# Patient Record
Sex: FEMALE | Race: WHITE | NOT HISPANIC OR LATINO | Employment: UNEMPLOYED | URBAN - METROPOLITAN AREA
[De-identification: names, ages, dates, MRNs, and addresses within clinical notes are randomized per-mention and may not be internally consistent; named-entity substitution may affect disease eponyms.]

---
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
## 2023-01-18 ENCOUNTER — OFFICE VISIT (OUTPATIENT)
Dept: PEDIATRICS | Facility: CLINIC | Age: 18
End: 2023-01-18
Payer: COMMERCIAL

## 2023-01-18 VITALS
DIASTOLIC BLOOD PRESSURE: 71 MMHG | OXYGEN SATURATION: 97 % | BODY MASS INDEX: 26.94 KG/M2 | TEMPERATURE: 97.8 F | WEIGHT: 157.8 LBS | HEIGHT: 64 IN | HEART RATE: 87 BPM | SYSTOLIC BLOOD PRESSURE: 117 MMHG

## 2023-01-18 DIAGNOSIS — Z86.19 HISTORY OF CHLAMYDIA: Primary | ICD-10-CM

## 2023-01-18 PROCEDURE — 99213 OFFICE O/P EST LOW 20 MIN: CPT | Performed by: PEDIATRICS

## 2023-01-18 PROCEDURE — 87491 CHLMYD TRACH DNA AMP PROBE: CPT | Performed by: PEDIATRICS

## 2023-01-18 NOTE — PROGRESS NOTES
"  Assessment & Plan     History of chlamydia  - Chlamydia trachomatis PCR  If tests positive, would like Doxycycline again sent to the [facility] in [place].             BMI:   Estimated body mass index is 27.09 kg/m  as calculated from the following:    Height as of this encounter: 5' 4\" (1.626 m).    Weight as of this encounter: 157 lb 12.8 oz (71.6 kg).       Return in about 2 weeks (around 2/1/2023) for nexplanon removal and replacement.    Alem Nagel MD  [facility]      Víctor Yusuf is a 17 year old, presenting for the following health issues:  STD      STD    History of Present Illness       Reason for visit:  Std check up    She eats 2-3 servings of fruits and vegetables daily.She consumes 1 sweetened beverage(s) daily.She exercises with enough effort to increase her heart rate 9 or less minutes per day.  She exercises with enough effort to increase her heart rate 3 or less days per week.   She is taking medications regularly.     Madelin tested postive for chlamydia 1 month ago.  She was treated with Doxycyclene and comepleted 14 day course.  Her sexual partner was perhaps also treated, patient informed him and has not been sexually active with him since.    She had negative testing at that time for gonorrhea, syphilis, and HIV.  She has never had any symptoms, whether she was testing positive or negative for chlamydia.    Madelin has a nexplanon in place that was placed at the end of January 2020.  She would like to have it removed and a new one placed.  She has questions about the procedure and how to schedule it, as well as when she would need to use back up contraception.  She is not sexually active recently.          Review of Systems   Constitutional, HEENT, cardiovascular, pulmonary, gi and gu systems are negative, except as otherwise noted.      Objective    /71   Pulse 87   Temp 97.8  F (36.6  C) (Tympanic)   Ht 5' 4\" (1.626 m)   Wt 157 lb 12.8 oz (71.6 " kg)   SpO2 97%   BMI 27.09 kg/m    Body mass index is 27.09 kg/m .  Physical Exam   GENERAL: Healthy, alert and no distress  EYES: Eyes grossly normal to inspection.  No discharge or erythema, or obvious scleral/conjunctival abnormalities.  RESP: No audible wheeze, cough, or visible cyanosis.  No visible retractions or increased work of breathing.    SKIN: Visible skin clear. No significant rash, abnormal pigmentation or lesions.  NEURO: Cranial nerves grossly intact.  Mentation and speech appropriate for age.  PSYCH: Mentation appears normal, affect normal/bright, judgement and insight intact, normal speech and appearance well-groomed.      Chlamydia urine PCR pending.

## 2023-01-19 LAB — C TRACH DNA SPEC QL NAA+PROBE: NEGATIVE

## 2023-01-19 NOTE — RESULT ENCOUNTER NOTE
Chlamydia testing is negative today. Usually recommend recheck every 6 months. Please make sure your partner(s) are also being treated. Recommend use condoms EVERY TIME!    Latricia Pereira MD on 1/19/2023 at 11:38 AM

## 2023-01-30 ENCOUNTER — OFFICE VISIT (OUTPATIENT)
Dept: PEDIATRICS | Facility: CLINIC | Age: 18
End: 2023-01-30
Payer: COMMERCIAL

## 2023-01-30 VITALS
HEART RATE: 105 BPM | BODY MASS INDEX: 28.01 KG/M2 | OXYGEN SATURATION: 99 % | WEIGHT: 163.2 LBS | TEMPERATURE: 98.9 F | DIASTOLIC BLOOD PRESSURE: 70 MMHG | SYSTOLIC BLOOD PRESSURE: 115 MMHG

## 2023-01-30 DIAGNOSIS — Z30.46 ENCOUNTER FOR REMOVAL AND REINSERTION OF NEXPLANON: Primary | ICD-10-CM

## 2023-01-30 RX ORDER — ETONOGESTREL 68 MG/1
1 IMPLANT SUBCUTANEOUS ONCE
Qty: 1 EACH | Refills: 0
Start: 2023-01-30 | End: 2023-01-30

## 2023-01-30 NOTE — PROGRESS NOTES
Subjective   Madelin is a 17 year old, presenting for the following health issues:  Contraception      INDICATIONS:                                                      Patient presents for removal and replacement of Nexplanon for contraception.  She has had been counseled on side effects, risks, benefits and alternatives.  Patient desires to proceed.    Is a pregnancy test required: No.  Was a consent obtained?  Yes    Verification of Procedure:  Just before the procedure begans through verbal and active participation of team members, I verified:     Initials   Patient Name Madelin Spain    Patient  2005    Procedure to be performed Nexplanon removal and new nexplament placement     Consent:  Risks, benefits of treatment, and alternative options for contraception were discussed.  Patient's questions were elicited and answered.  Written consent was obtained and scanned into medical record.     Today's PHQ-2 Score:   PHQ-2 (  [employer]) 2023   Q1: Little interest or pleasure in doing things 0   Q2: Feeling down, depressed or hopeless 0   PHQ-2 Score 0   PHQ-2 Total Score (12-17 Years)- Positive if 3 or more points; Administer PHQ-A if positive -   Q1: Little interest or pleasure in doing things Not at all   Q2: Feeling down, depressed or hopeless Not at all   PHQ-2 Score 0       PROCEDURE:                                                      Patient was resting comfortably on exam table, left arm placed at shoulder level in a 90 degree angle.  Skin was  cleansed with betadine solution.  Nexplanon implant as grasped through the skin and 1 cc of 1% lidocaine with epinephrine is infiltrated under the distal tip of the joshua.  Skin is then cut with #11 scalpel over the distal tip of the joshua, and the joshua is caty from its fibrous sheath.  Joshua is grasped with toothed forceps and removed intact.     Nexplanon device visualized in applicator by patient and provider.  Applicator is placed at skin incision made as  above and advanced with ease in the subdermal space.  Applicator was removed.  Nexplanon was palpated by provider and patient.    Lot number:[id number]   Expiration date: 8/12/24  NDC number: 94100-319-01    Small amount of bleeding noted at insertion site and no bruising noted along track of Nexplanon.  Bandage and pressure dressing applied to insertion site.         POST PROCEDURE:                                                      To be removed/replaced within three years. Written and verbal instructions provided to patient.  She tolerated the procedure well. There were no complications. Patient was discharged in stable condition.    Keep dressing on and dry for 24 hours, then remove wrap.  Replace bandaid daily for 5 days. Keep your user card in a safe place where you'll remember it.  Make note of today's date for removal/replacement of your Nexplanon in 3 years. Call us if there is any redness, tenderness, warmth or drainage from the area of your Nexplanon insertion. Use a backup method of birth control for 7 days.      Alem Nagel MD    Answers for HPI/ROS submitted by the patient on 1/18/2023  What is the reason for your visit today? : Std check up  How many servings of fruits and vegetables do you eat daily?: 2-3  On average, how many sweetened beverages do you drink each day (Examples: soda, juice, sweet tea, etc.  Do NOT count diet or artificially sweetened beverages)?: 1  How many minutes a day do you exercise enough to make your heart beat faster?: 9 or less  How many days a week do you exercise enough to make your heart beat faster?: 3 or less  How many days per week do you miss taking your medication?: 0

## 2023-01-31 PROCEDURE — 58301 REMOVE INTRAUTERINE DEVICE: CPT | Performed by: PEDIATRICS

## 2023-01-31 PROCEDURE — 91312 COVID-19 VACCINE BIVALENT BOOSTER 12+ (PFIZER): CPT | Performed by: PEDIATRICS

## 2023-01-31 PROCEDURE — 0124A COVID-19 VACCINE BIVALENT BOOSTER 12+ (PFIZER): CPT | Performed by: PEDIATRICS

## 2023-01-31 PROCEDURE — 11981 INSERTION DRUG DLVR IMPLANT: CPT | Performed by: PEDIATRICS

## 2023-02-22 ENCOUNTER — TELEPHONE (OUTPATIENT)
Dept: DERMATOLOGY | Facility: CLINIC | Age: 18
End: 2023-02-22
Payer: COMMERCIAL

## 2023-02-22 NOTE — TELEPHONE ENCOUNTER
[facility] Call Center    Phone Message    May a detailed message be left on voicemail: yes     Reason for Call: Other: Patient called to reschedule procedure with Dr. Hinton scheduled for 02/22/2023. Please call back [phone] Thank you     Action Taken: Other: OX DERM    Travel Screening: Not Applicable

## 2023-03-07 ENCOUNTER — OFFICE VISIT (OUTPATIENT)
Dept: PEDIATRICS | Facility: CLINIC | Age: 18
End: 2023-03-07
Payer: COMMERCIAL

## 2023-03-07 VITALS
WEIGHT: 159.1 LBS | SYSTOLIC BLOOD PRESSURE: 92 MMHG | DIASTOLIC BLOOD PRESSURE: 58 MMHG | BODY MASS INDEX: 27.16 KG/M2 | HEART RATE: 84 BPM | TEMPERATURE: 97.5 F | HEIGHT: 64 IN | OXYGEN SATURATION: 99 %

## 2023-03-07 DIAGNOSIS — Z00.129 ENCOUNTER FOR ROUTINE CHILD HEALTH EXAMINATION W/O ABNORMAL FINDINGS: Primary | ICD-10-CM

## 2023-03-07 DIAGNOSIS — K59.00 CONSTIPATION, UNSPECIFIED CONSTIPATION TYPE: ICD-10-CM

## 2023-03-07 LAB — C TRACH DNA SPEC QL NAA+PROBE: NEGATIVE

## 2023-03-07 PROCEDURE — 87491 CHLMYD TRACH DNA AMP PROBE: CPT | Performed by: PEDIATRICS

## 2023-03-07 PROCEDURE — 99213 OFFICE O/P EST LOW 20 MIN: CPT | Mod: 25 | Performed by: PEDIATRICS

## 2023-03-07 PROCEDURE — 92551 PURE TONE HEARING TEST AIR: CPT | Performed by: PEDIATRICS

## 2023-03-07 PROCEDURE — 99173 VISUAL ACUITY SCREEN: CPT | Mod: 59 | Performed by: PEDIATRICS

## 2023-03-07 PROCEDURE — 99395 PREV VISIT EST AGE 18-39: CPT | Performed by: PEDIATRICS

## 2023-03-07 RX ORDER — DOCUSATE SODIUM 100 MG/1
CAPSULE, LIQUID FILLED ORAL
Qty: 100 CAPSULE | Refills: 1 | Status: SHIPPED | OUTPATIENT
Start: 2023-03-07 | End: 2023-04-12

## 2023-03-07 RX ORDER — DOCUSATE SODIUM 100 MG/1
CAPSULE, LIQUID FILLED ORAL
Qty: 100 CAPSULE | Refills: 1 | Status: SHIPPED | OUTPATIENT
Start: 2023-03-07 | End: 2023-03-07

## 2023-03-07 ASSESSMENT — ENCOUNTER SYMPTOMS
MYALGIAS: 0
COUGH: 0
CHILLS: 0
NERVOUS/ANXIOUS: 0
CONSTIPATION: 1
PARESTHESIAS: 0
JOINT SWELLING: 0
NAUSEA: 0
FREQUENCY: 0
DIZZINESS: 0
PALPITATIONS: 0
DIARRHEA: 0
HEADACHES: 0
HEMATURIA: 0
DYSURIA: 0
HEMATOCHEZIA: 0
BREAST MASS: 0
SHORTNESS OF BREATH: 0
FEVER: 0
ARTHRALGIAS: 0
SORE THROAT: 0
EYE PAIN: 0
ABDOMINAL PAIN: 0
WEAKNESS: 0
HEARTBURN: 0

## 2023-03-07 NOTE — PROGRESS NOTES
Preventive Care Visit  [facility]  Mar 7, 2023    Assessment & Plan   17 year old, here for preventive care.    (Z00.129) Encounter for routine child health examination w/o abnormal findings  (primary encounter diagnosis)  Plan: BEHAVIORAL/EMOTIONAL ASSESSMENT (30413),         SCREENING TEST, PURE TONE, AIR ONLY, SCREENING,        VISUAL ACUITY, QUANTITATIVE, BILAT, Chlamydia         trachomatis PCR, Lipid Profile (Chol, Trig,         HDL, LDL calc)            (K59.00) Constipation, unspecified constipation type  Plan: docusate sodium (COLACE) 100 MG capsule,        Continue senna          Patient has been advised of split billing requirements and indicates understanding: Yes  Growth      Height: Normal , Weight: Overweight (BMI 85-94.9%)    Immunizations   Vaccines up to date.MenB Vaccine not indicated.    Anticipatory Guidance    Reviewed age appropriate anticipatory guidance.   SOCIAL/ FAMILY:    Peer pressure    Future plans/ College    Transition to adult care provider  NUTRITION:    Healthy food choices    Weight management  HEALTH / SAFETY:    Adequate sleep/ exercise  SEXUALITY:    Body changes with puberty    Menstruation    Contraception     Safe sex/ STDs    Cleared for sports:  Not addressed    Referrals/Ongoing Specialty Care  None  Verbal Dental Referral: Verbal dental referral was given      Follow Up      Return in 1 year (on 3/7/2024) for Preventive Care visit.    Víctor Yusuf continues to have hard stools, and does not find Miralax effective because she cannot always bring herself to drink the liquid that its in.  She has started taking Senna 25 mg tablet which has made her stools come more often, but they are still firm and somewhat painful      Recent Labs   Lab Test 07/25/22  1126 10/04/21  1213   CHOL 180* 225*   HDL 60 62    150*   TRIG 65 65         Psycho-Social/Depression - PSC-17 required for C&TC through age 18  General  "screening:  No screening tool used  Teen Screen    Teen Screen not completed: patient is an adult    No flowsheet data found.       Objective     Exam  BP 92/58   Pulse 84   Temp 97.5  F (36.4  C) (Tympanic)   Ht 5' 3.75\" (1.619 m)   Wt 159 lb 1.6 oz (72.2 kg)   SpO2 99%   BMI 27.52 kg/m    41 %ile (Z= -0.22) based on CDC (Girls, 2-20 Years) Stature-for-age data based on Stature recorded on 3/7/2023.  87 %ile (Z= 1.12) based on CDC (Girls, 2-20 Years) weight-for-age data using vitals from 3/7/2023.  89 %ile (Z= 1.21) based on CDC (Girls, 2-20 Years) BMI-for-age based on BMI available as of 3/7/2023.  Blood pressure percentiles are not available for patients who are 18 years or older.  Wt Readings from Last 4 Encounters:   03/07/23 159 lb 1.6 oz (72.2 kg) (87 %, Z= 1.12)*   01/30/23 163 lb 3.2 oz (74 kg) (89 %, Z= 1.23)*   01/18/23 157 lb 12.8 oz (71.6 kg) (86 %, Z= 1.09)*   09/09/22 153 lb (69.4 kg) (84 %, Z= 0.98)*     * Growth percentiles are based on [facility] (Girls, 2-20 Years) data.       Vision Screen  Vision Screen Details  Does the patient have corrective lenses (glasses/contacts)?: No  Vision Acuity Screen  Vision Acuity Tool: Lu  RIGHT EYE: 10/10 (20/20)  LEFT EYE: 10/8 (20/16)    Hearing Screen  RIGHT EAR  1000 Hz on Level 40 dB (Conditioning sound): Pass  1000 Hz on Level 20 dB: Pass  2000 Hz on Level 20 dB: Pass  4000 Hz on Level 20 dB: Pass  6000 Hz on Level 20 dB: Pass  8000 Hz on Level 20 dB: Pass  LEFT EAR  8000 Hz on Level 20 dB: Pass  6000 Hz on Level 20 dB: Pass  4000 Hz on Level 20 dB: Pass  2000 Hz on Level 20 dB: Pass  1000 Hz on Level 20 dB: Pass  500 Hz on Level 25 dB: Pass  RIGHT EAR  500 Hz on Level 25 dB: Pass  Results  Hearing Screen Results: Pass      Physical Exam  GENERAL: Active, alert, in no acute distress.  SKIN: Clear. No significant rash, abnormal pigmentation or lesions  HEAD: Normocephalic  EYES: Pupils equal, round, reactive, Extraocular muscles intact. Normal " conjunctivae.  EARS: Normal canals. Tympanic membranes are normal; gray and translucent.  NOSE: Normal without discharge.  MOUTH/THROAT: Clear. No oral lesions. Teeth without obvious abnormalities.  NECK: Supple, no masses.  No thyromegaly.  LYMPH NODES: No adenopathy  LUNGS: Clear. No rales, rhonchi, wheezing or retractions  HEART: Regular rhythm. Normal S1/S2. No murmurs. Normal pulses.  ABDOMEN: Soft, non-tender, not distended, no masses or hepatosplenomegaly. Bowel sounds normal.   NEUROLOGIC: No focal findings. Cranial nerves grossly intact: DTR's normal. Normal gait, strength and tone  BACK: Spine is straight, no scoliosis.  EXTREMITIES: Full range of motion, no deformities  : Normal female external genitalia, Kaushik stage 5.   BREASTS:  Kaushik stage 5.  No abnormalities.        Alem Nagel MD  [facility]  Answers for HPI/ROS submitted by the patient on 3/7/2023  Frequency of exercise:: 4-5 days/week  Getting at least 3 servings of Calcium per day:: Yes  Diet:: Regular (no restrictions), Low fat/cholesterol  Taking medications regularly:: Yes  Bi-annual eye exam:: NO  Dental care twice a year:: NO  Sleep apnea or symptoms of sleep apnea:: Daytime drowsiness  abdominal pain: No  Blood in stool: No  Blood in urine: No  chest pain: No  chills: No  congestion: No  constipation: Yes  cough: No  diarrhea: No  dizziness: No  ear pain: No  eye pain: No  nervous/anxious: No  fever: No  frequency: No  genital sores: No  headaches: No  hearing loss: No  heartburn: No  arthralgias: No  joint swelling: No  peripheral edema: No  mood changes: No  myalgias: No  nausea: No  dysuria: No  palpitations: No  Skin sensation changes: No  sore throat: No  urgency: No  rash: No  shortness of breath: No  visual disturbance: No  weakness: No  pelvic pain: No  vaginal bleeding: No  vaginal discharge: No  tenderness: No  breast mass: No  breast discharge: No  Additional concerns today:: No  Duration of  exercise:: 15-30 minutes

## 2023-03-07 NOTE — PATIENT INSTRUCTIONS
Patient Education    [facility] PATIENT  18 THROUGH 21 YEAR VISITS  Here are some suggestions from [employer] experts that may be of value to your family.     HOW YOU ARE DOING  Enjoy spending time with your family.  Find activities you are really interested in, such as sports, theater, or volunteering.  Try to be responsible for your schoolwork or work obligations.  Always talk through problems and never use violence.  If you get angry with someone, try to walk away.  If you feel unsafe in your home or have been hurt by someone, let us know. Hotlines and community agencies can also provide confidential help.  Talk with us if you are worried about your living or food situation. Community agencies and programs such as SNAP can help.  Don t smoke, vape, or use drugs. Avoid people who do when you can. Talk with us if you are worried about alcohol or drug use in your family.    YOUR DAILY LIFE  Visit the dentist at least twice a year.  Brush your teeth at least twice a day and floss once a day.  Be a healthy eater.  Have vegetables, fruits, lean protein, and whole grains at meals and snacks.  Limit fatty, sugary, salty foods that are low in nutrients, such as candy, chips, and ice cream.  Eat when you re hungry. Stop when you feel satisfied.  Eat breakfast.  Drink plenty of water.  Make sure to get enough calcium every day.  Have 3 or more servings of low-fat (1%) or fat-free milk and other low-fat dairy products, such as yogurt and cheese.  Women: Make sure to eat foods rich in folate, such as fortified grains and dark- green leafy vegetables.  Aim for at least 1 hour of physical activity every day.  Wear safety equipment when you play sports.  Get enough sleep.  Talk with us about managing your health care and insurance as an adult.    YOUR FEELINGS  Most people have ups and downs. If you are feeling sad, depressed, nervous, irritable, hopeless, or angry, let us know or reach out to another health  care professional.  Figure out healthy ways to deal with stress.  Try your best to solve problems and make decisions on your own.  Sexuality is an important part of your life. If you have any questions or concerns, we are here for you.    HEALTHY BEHAVIOR CHOICES  Avoid using drugs, alcohol, tobacco, steroids, and diet pills. Support friends who choose not to use.  If you use drugs or alcohol, let us know or talk with another trusted adult about it. We can help you with quitting or cutting down on your use.  Make healthy decisions about your sexual behavior.  If you are sexually active, always practice safe sex. Always use birth control along with a condom to prevent pregnancy and sexually transmitted infections.  All sexual activity should be something you want. No one should ever force or try to convince you.  Protect your hearing at work, home, and concerts. Keep your earbud volume down.    STAYING SAFE  Always be a safe and cautious .  Insist that everyone use a lap and shoulder seat belt.  Limit the number of friends in the car and avoid driving at night.  Avoid distractions. Never text or talk on the phone while you drive.  Do not ride in a vehicle with someone who has been using drugs or alcohol.  If you feel unsafe driving or riding with someone, call someone you trust to drive you.  Wear helmets and protective gear while playing sports. Wear a helmet when riding a bike, a motorcycle, or an ATV or when skiing or skateboarding.  Always use sunscreen and a hat when you re outside.  Fighting and carrying weapons can be dangerous. Talk with your parents, teachers, or doctor about how to avoid these situations.        Consistent with Bright Futures: Guidelines for Health Supervision of Infants, Children, and Adolescents, 4th Edition  For more information, go to https://brightfutures.aap.org.

## 2023-03-16 ENCOUNTER — OFFICE VISIT (OUTPATIENT)
Dept: DERMATOLOGY | Facility: CLINIC | Age: 18
End: 2023-03-16
Payer: COMMERCIAL

## 2023-03-16 DIAGNOSIS — L72.0 EPIDERMAL CYST: Primary | ICD-10-CM

## 2023-03-16 PROCEDURE — 88331 PATH CONSLTJ SURG 1 BLK 1SPC: CPT | Performed by: DERMATOLOGY

## 2023-03-16 PROCEDURE — 11401 EXC TR-EXT B9+MARG 0.6-1 CM: CPT | Performed by: DERMATOLOGY

## 2023-03-16 PROCEDURE — 12031 INTMD RPR S/A/T/EXT 2.5 CM/<: CPT | Performed by: DERMATOLOGY

## 2023-03-16 ASSESSMENT — PAIN SCALES - GENERAL: PAINLEVEL: NO PAIN (0)

## 2023-03-16 NOTE — LETTER
3/16/2023         RE: Madelin Spain  [address]  [facility] [address]        Dear Colleague,    Thank you for referring your patient, Madelin Spain, to the [facility]. Please see a copy of my visit note below.    Madelin Spain is an extremely pleasant 17 year old year old female patient here today for evaluation and managment of cyst on back.   Patient has no other skin complaints today.  Remainder of the HPI, Meds, PMH, Allergies, FH, and SH was reviewed in chart.      Past Medical History:   Diagnosis Date     Menorrhagia        Past Surgical History:   Procedure Laterality Date     NO HISTORY OF SURGERY          Family History   Problem Relation Age of Onset     Family History Negative Mother      Coronary Artery Disease Father      Hypertension Father      Hypertension Maternal Grandmother      Breast Cancer Maternal Aunt        Social History     Socioeconomic History     Marital status: Single     Spouse name: Not on file     Number of children: Not on file     Years of education: Not on file     Highest education level: Not on file   Occupational History     Not on file   Tobacco Use     Smoking status: Never     Passive exposure: Yes     Smokeless tobacco: Never     Tobacco comments:     dad   Vaping Use     Vaping Use: Never used   Substance and Sexual Activity     Alcohol use: No     Drug use: No     Sexual activity: Yes     Partners: Male   Other Topics Concern     Not on file   Social History Narrative     Not on file     Social Determinants of Health     Financial Resource Strain: Not on file   Food Insecurity: Not on file   Transportation Needs: Not on file   Physical Activity: Not on file   Stress: Not on file   Social Connections: Not on file   Intimate Partner Violence: Not on file   Housing Stability: Not on file       Outpatient Encounter Medications as of 3/16/2023   Medication Sig Dispense Refill     docusate sodium (COLACE) 100 MG capsule 1-2  tabs once daily 100 capsule 1     benzoyl peroxide (PANOXYL) 10 % external liquid Use daily in the shower (Patient not taking: Reported on 1/30/2023) 227 g 11     etonogestrel (NEXPLANON) 68 MG IMPL 1 each (68 mg) by Subdermal route once for 1 dose 1 each 0     Sennosides 25 MG TABS  (Patient not taking: Reported on 3/16/2023)       tretinoin (RETIN-A) 0.025 % external cream Apply a pea size to entire face QD (Patient not taking: Reported on 1/30/2023) 45 g 11     No facility-administered encounter medications on file as of 3/16/2023.             O:   NAD, WDWN, Alert & Oriented, Mood & Affect wnl, Vitals stable   Here today alone   Breastfeeding No    General appearance normal   Vitals stable   Alert, oriented and in no acute distress     Lower back 6mm ndoule with comedone      Eyes: Conjunctivae/lids:Normal     ENT: Lips, buccal mucosa, tongue: normal    MSK:Normal    Cardiovascular: peripheral edema none    Pulm: Breathing Normal    Neuro/Psych: Orientation:Alert and Orientedx3 ; Mood/Affect:normal       MICRO:   Lower back: cyst lined by stratified squamous epithelium with epidermal keratinization   A/P:  1. Cyst  Scar and recurrence discussed with patient   EXCISION OF CYST AND INT: After thorough discussion of PGACAC, consent obtained, anesthesia and prep, the margins of the cyst were identified and an incision was made encompassing the cyst. The incisions were made through the skin and down to and including the subcutaneous tissue. The cyst was removed en bloc and submitted for frozen pathologic review. hemostasis was achieved. The wound edges were then closed in a layered fashion 4 vicryl and 5 fast, , being careful not to leave any dead space. Postoperative length was 1 cm.   EBL minimal; complications none; wound care routine. The patient was discharged in good condition and will return in one week for wound evaluation.  It was a pleasure speaking to Madelin Spain today.        Again, thank you for  allowing me to participate in the care of your patient.        Sincerely,        Dominic Hinton MD

## 2023-03-16 NOTE — PROGRESS NOTES
Madelin Spain is an extremely pleasant 17 year old year old female patient here today for evaluation and managment of cyst on back.   Patient has no other skin complaints today.  Remainder of the HPI, Meds, PMH, Allergies, FH, and SH was reviewed in chart.      Past Medical History:   Diagnosis Date     Menorrhagia        Past Surgical History:   Procedure Laterality Date     NO HISTORY OF SURGERY          Family History   Problem Relation Age of Onset     Family History Negative Mother      Coronary Artery Disease Father      Hypertension Father      Hypertension Maternal Grandmother      Breast Cancer Maternal Aunt        Social History     Socioeconomic History     Marital status: Single     Spouse name: Not on file     Number of children: Not on file     Years of education: Not on file     Highest education level: Not on file   Occupational History     Not on file   Tobacco Use     Smoking status: Never     Passive exposure: Yes     Smokeless tobacco: Never     Tobacco comments:     dad   Vaping Use     Vaping Use: Never used   Substance and Sexual Activity     Alcohol use: No     Drug use: No     Sexual activity: Yes     Partners: Male   Other Topics Concern     Not on file   Social History Narrative     Not on file     Social Determinants of Health     Financial Resource Strain: Not on file   Food Insecurity: Not on file   Transportation Needs: Not on file   Physical Activity: Not on file   Stress: Not on file   Social Connections: Not on file   Intimate Partner Violence: Not on file   Housing Stability: Not on file       Outpatient Encounter Medications as of 3/16/2023   Medication Sig Dispense Refill     docusate sodium (COLACE) 100 MG capsule 1-2 tabs once daily 100 capsule 1     benzoyl peroxide (PANOXYL) 10 % external liquid Use daily in the shower (Patient not taking: Reported on 1/30/2023) 227 g 11     etonogestrel (NEXPLANON) 68 MG IMPL 1 each (68 mg) by Subdermal route once for 1 dose 1 each 0      Sennosides 25 MG TABS  (Patient not taking: Reported on 3/16/2023)       tretinoin (RETIN-A) 0.025 % external cream Apply a pea size to entire face QD (Patient not taking: Reported on 1/30/2023) 45 g 11     No facility-administered encounter medications on file as of 3/16/2023.             O:   NAD, WDWN, Alert & Oriented, Mood & Affect wnl, Vitals stable   Here today alone   Breastfeeding No    General appearance normal   Vitals stable   Alert, oriented and in no acute distress     Lower back 6mm ndoule with comedone      Eyes: Conjunctivae/lids:Normal     ENT: Lips, buccal mucosa, tongue: normal    MSK:Normal    Cardiovascular: peripheral edema none    Pulm: Breathing Normal    Neuro/Psych: Orientation:Alert and Orientedx3 ; Mood/Affect:normal       MICRO:   Lower back: cyst lined by stratified squamous epithelium with epidermal keratinization   A/P:  1. Cyst  Scar and recurrence discussed with patient   EXCISION OF CYST AND INT: After thorough discussion of PGACAC, consent obtained, anesthesia and prep, the margins of the cyst were identified and an incision was made encompassing the cyst. The incisions were made through the skin and down to and including the subcutaneous tissue. The cyst was removed en bloc and submitted for frozen pathologic review. hemostasis was achieved. The wound edges were then closed in a layered fashion 4 vicryl and 5 fast, , being careful not to leave any dead space. Postoperative length was 1 cm.   EBL minimal; complications none; wound care routine. The patient was discharged in good condition and will return in one week for wound evaluation.  It was a pleasure speaking to Madelin Spain today.

## 2023-03-20 ENCOUNTER — TELEPHONE (OUTPATIENT)
Dept: DERMATOLOGY | Facility: CLINIC | Age: 18
End: 2023-03-20

## 2023-03-20 ENCOUNTER — ALLIED HEALTH/NURSE VISIT (OUTPATIENT)
Dept: DERMATOLOGY | Facility: CLINIC | Age: 18
End: 2023-03-20
Payer: COMMERCIAL

## 2023-03-20 ENCOUNTER — MYC MEDICAL ADVICE (OUTPATIENT)
Dept: PEDIATRICS | Facility: CLINIC | Age: 18
End: 2023-03-20

## 2023-03-20 ENCOUNTER — LAB (OUTPATIENT)
Dept: LAB | Facility: CLINIC | Age: 18
End: 2023-03-20
Payer: COMMERCIAL

## 2023-03-20 DIAGNOSIS — Z48.01 DRESSING CHANGE OR REMOVAL, SURGICAL WOUND: Primary | ICD-10-CM

## 2023-03-20 DIAGNOSIS — Z00.129 ENCOUNTER FOR ROUTINE CHILD HEALTH EXAMINATION W/O ABNORMAL FINDINGS: ICD-10-CM

## 2023-03-20 DIAGNOSIS — Z11.3 SCREEN FOR STD (SEXUALLY TRANSMITTED DISEASE): Primary | ICD-10-CM

## 2023-03-20 LAB
CHOLEST SERPL-MCNC: 200 MG/DL
HDLC SERPL-MCNC: 61 MG/DL
LDLC SERPL CALC-MCNC: 125 MG/DL
NONHDLC SERPL-MCNC: 139 MG/DL
TRIGL SERPL-MCNC: 70 MG/DL

## 2023-03-20 PROCEDURE — 80061 LIPID PANEL: CPT

## 2023-03-20 PROCEDURE — 99207 PR NO CHARGE NURSE ONLY: CPT | Performed by: STUDENT IN AN ORGANIZED HEALTH CARE EDUCATION/TRAINING PROGRAM

## 2023-03-20 PROCEDURE — 36415 COLL VENOUS BLD VENIPUNCTURE: CPT

## 2023-03-20 NOTE — TELEPHONE ENCOUNTER
Spoke with pt and scheduled nurse only appt.    Thank you,  Jonelle MAKI RN  Dermatology   [phone]

## 2023-03-20 NOTE — TELEPHONE ENCOUNTER
"Spoke with patient regarding MyChart message. Patient stated she is not currently having any new symptoms but stated she had a partner from \"a few years back\" that she has not been involved with recently and she wanted to make sure she did not contract anything from this partner. Patient stated she has not had any new partners.    Patient is requesting if OV should be made that it can be made for 3/24/2023 because she has a Derm Appt scheduled that day.    Patient is also requesting a call back, not to send another aihuishouhart message.     Routing to PCP for review.   "

## 2023-03-20 NOTE — RESULT ENCOUNTER NOTE
Lipid panel is better than a year ago but a little worse than 6 months ago. HDL which is good cholesterol is high which is protective. LDL in acceptable range. Continue to make healthy choices. Recommend recheck in 2-3 years.    Latricia Pereira MD on 3/20/2023 at 5:53 PM

## 2023-03-20 NOTE — PROGRESS NOTES
Madelin Spain comes into clinic today at the request of Dr. Hinton Ordering Provider for Wound Check Action taken: pt bandage fell off, but not due to be changed for 5 days. pt came in for new bandage. wound looks good, no s/s of infection..    This service provided today was under the supervising provider of the day Dr. Rubin, who was available if needed.    Please see providers note on 3/16/23 for orders   Patient has no complaints, denies pain.  Area cleansed with wound cleanser. Site is healing with no signs of infection, wound edges approximating well.   Reapplied new steri strips and paper tape.       Thank you,  Jonelle MAKI RN  Dermatology   [phone]

## 2023-03-20 NOTE — TELEPHONE ENCOUNTER
RN please triage. Patient is requesting STD checkup. Hx of chlamydia noted, but had a negative chlamydia PCR on 03/07/2023. Any new symptoms? Vaginal discharge? Dysuria?  If so please submit an e-visit.     Just needing clarification on why patient is requesting testing .   New partner? Just to be safe? We usually recommend screening every 6 months in this age group    Latricia Pereira MD on 3/20/2023 at 9:14 AM

## 2023-03-20 NOTE — TELEPHONE ENCOUNTER
[facility] Call Center    Phone Message    May a detailed message be left on voicemail: yes     Reason for Call: Other: Pt states the bandage from the cyst removal on 3/16/23 has come off and she would like it replaced.      Pt states she will be at the clinic at 11:45 for labs and would like to know if it can be done around that time.     Please call Pt back to discuss. Thank you.     Action Taken: Other: OX Derm    Travel Screening: Not Applicable

## 2023-03-20 NOTE — TELEPHONE ENCOUNTER
It's OK I can put the orders in if she prefers to do a lab only visit that day.  Does she want screening for HIV/Syphylis as well (blood tests- can potentially add on HIV and Hep C to today's lab draw but not syphylis testing) or just urine/vaginal swab (can self-collect)    Is she using condoms every time? (we have some if she needs some!)   What is the pregnancy prevention plan ? (condoms alone are not enough)  If no plan then please schedule visit to discuss!    Latricia Pereira MD on 3/20/2023 at 3:06 PM

## 2023-03-21 NOTE — TELEPHONE ENCOUNTER
Called and spoke with pt. Pt okay with Lab only visit. Pt would like to have blood screening recommended by PCP. Pt okay with having labs drawn on Thursday.    Appointments in Next Year    Mar 23, 2023  1:15 PM  [facility] ) [phone]     Pt states she does not use condoms every time. Pt states she has the nexplanon.     Routing to provider for review of lab orders.     Carrol Gilbert RN

## 2023-03-23 ENCOUNTER — LAB (OUTPATIENT)
Dept: LAB | Facility: CLINIC | Age: 18
End: 2023-03-23
Payer: COMMERCIAL

## 2023-03-23 ENCOUNTER — MYC MEDICAL ADVICE (OUTPATIENT)
Dept: PEDIATRICS | Facility: CLINIC | Age: 18
End: 2023-03-23

## 2023-03-23 DIAGNOSIS — Z11.3 SCREEN FOR STD (SEXUALLY TRANSMITTED DISEASE): ICD-10-CM

## 2023-03-23 DIAGNOSIS — N76.0 BACTERIAL VAGINOSIS: Primary | ICD-10-CM

## 2023-03-23 DIAGNOSIS — B96.89 BACTERIAL VAGINOSIS: Primary | ICD-10-CM

## 2023-03-23 LAB
CLUE CELLS: PRESENT
TRICHOMONAS, WET PREP: ABNORMAL
WBC'S/HIGH POWER FIELD, WET PREP: ABNORMAL
YEAST, WET PREP: ABNORMAL

## 2023-03-23 PROCEDURE — 87491 CHLMYD TRACH DNA AMP PROBE: CPT | Performed by: PEDIATRICS

## 2023-03-23 PROCEDURE — 87210 SMEAR WET MOUNT SALINE/INK: CPT

## 2023-03-23 PROCEDURE — 87591 N.GONORRHOEAE DNA AMP PROB: CPT | Performed by: PEDIATRICS

## 2023-03-23 RX ORDER — METRONIDAZOLE 500 MG/1
500 TABLET ORAL 2 TIMES DAILY
Qty: 14 TABLET | Refills: 0 | Status: SHIPPED | OUTPATIENT
Start: 2023-03-23 | End: 2023-03-30

## 2023-03-23 NOTE — RESULT ENCOUNTER NOTE
Wet prep shows clue cells which suggest bacterial vaginosis. Chlamydia and gonorrhea testing pending, but will send Rx for metronidazole to the pharmacy for you. No alcohol with this medication at all please!  Latricia Pereira MD on 3/23/2023 at 2:00 PM

## 2023-03-24 ENCOUNTER — ALLIED HEALTH/NURSE VISIT (OUTPATIENT)
Dept: DERMATOLOGY | Facility: CLINIC | Age: 18
End: 2023-03-24
Payer: COMMERCIAL

## 2023-03-24 DIAGNOSIS — Z48.01 DRESSING CHANGE OR REMOVAL, SURGICAL WOUND: Primary | ICD-10-CM

## 2023-03-24 LAB
C TRACH DNA SPEC QL PROBE+SIG AMP: NEGATIVE
N GONORRHOEA DNA SPEC QL NAA+PROBE: NEGATIVE

## 2023-03-24 PROCEDURE — 99207 PR NO CHARGE NURSE ONLY: CPT

## 2023-03-24 NOTE — PROGRESS NOTES
Madelinsarai Spain comes into clinic today at the request of Dr. Hinton Ordering Provider for Wound Check Action taken: bandage changed.    This service provided today was under the supervising provider of the day Barby Jackson PA-C, who was available if needed.    Please see providers note on 3/16/23 for orders  Patient returned to clinic for post surgery 1 week follow up bandage change. Patient has no complaints, denies pain.  Bandage removed from back. Area cleansed with wound cleanser. Site is healing with no signs of infection, wound edges approximating well.   Reapplied new steri strips and paper tape.     Advised to watch for signs and symptons of infection; spreading redness, increasing pain, drainage, odor, fever.   Call or report promptly to clinic if any of these symptoms are present.    Wound care post op instructions given and discussed for 14 day bandage removal and continued incision/scar care.    Patient verbalized understanding.     Thank you,  Jonelle MAKI RN  Dermatology   [phone]

## 2023-03-24 NOTE — PATIENT INSTRUCTIONS
WOUND CARE INSTRUCTIONS  for  ONE WEEK AFTER SURGERY          Leave flat bandage on your skin for one week after today s bandage change.  In one week when you remove the bandage, you may resume your regular skin care routine, including washing with mild soap and water, applying moisturizer, make-up and sunscreen.    If there are any open or bleeding areas at the incision/graft site you should begin to cover the area with a bandage daily as follows:    Clean and dry the area with plain tap water using a Q-tip or sterile gauze pad.  Apply Polysporin or Bacitracin ointment to the open area.  Cover the wound with a band-aid or a sterile non-stick gauze pad and micropore paper tape.         SIGNS OF INFECTION  - If you notice any of these signs of infection, call your doctor right away: expanding redness around the wound.  - Yellow or greenish-colored pus or cloudy wound drainage.    - Red streaking spreading from the wound.  - Increased swelling, tenderness, or pain around the wound.   - Fever.    Please remember that yellow and clear drainage from a wound can be normal and related to normal wound healing.  Isolated drainage from a wound without a combination of the above features does not indicate infection.       *Once the bandages are removed, the scar will be red and firm (especially in the lip/chin area). This is normal and will fade in time. It might take 6-12 months for this to happen.     *Massaging the area will help the scar soften and fade quicker. Begin to massage the area one month after the bandages have been removed. To massage apply pressure directly and firmly over the scar with the fingertips and move in a circular motion. Massage the area for a few minutes several times a day. Continue to massage the site for several months.    *Approximately 6-8 weeks after surgery it is not uncommon to see the formation of  tender pimple-like  bump along the scar. This is normal. As the scar continues to mature and  the stitches underneath the skin begin to dissolve, this might occur. Do not pick or squeeze, this will resolve on it s own. Should one break open producing a small amount of drainage, apply Polysporin or Bacitracin ointment a few times a day until the wound is completely healed.    *Numbness in the surgical area is expected. It might take 12-18 months for the feeling to return to normal. During this time sensations of itchiness, tingling and occasional sharp pains might be noted. These feelings are normal and will subside once the nerves have completely healed.         IN CASE OF EMERGENCY: Dr Hinton [phone]     If you were seen in [place] call: [phone]    If you were seen in [place] call: [phone]

## 2023-04-05 ENCOUNTER — MYC MEDICAL ADVICE (OUTPATIENT)
Dept: PEDIATRICS | Facility: CLINIC | Age: 18
End: 2023-04-05
Payer: COMMERCIAL

## 2023-04-12 ENCOUNTER — VIRTUAL VISIT (OUTPATIENT)
Dept: PEDIATRICS | Facility: CLINIC | Age: 18
End: 2023-04-12
Payer: COMMERCIAL

## 2023-04-12 ENCOUNTER — LAB (OUTPATIENT)
Dept: LAB | Facility: CLINIC | Age: 18
End: 2023-04-12
Payer: COMMERCIAL

## 2023-04-12 DIAGNOSIS — Z87.42 HISTORY OF VAGINITIS: ICD-10-CM

## 2023-04-12 DIAGNOSIS — Z11.3 SCREEN FOR STD (SEXUALLY TRANSMITTED DISEASE): Primary | ICD-10-CM

## 2023-04-12 DIAGNOSIS — K59.00 CONSTIPATION, UNSPECIFIED CONSTIPATION TYPE: ICD-10-CM

## 2023-04-12 DIAGNOSIS — Z11.3 SCREEN FOR STD (SEXUALLY TRANSMITTED DISEASE): ICD-10-CM

## 2023-04-12 LAB
CLUE CELLS: ABNORMAL
TRICHOMONAS, WET PREP: ABNORMAL
WBC'S/HIGH POWER FIELD, WET PREP: ABNORMAL
YEAST, WET PREP: ABNORMAL

## 2023-04-12 PROCEDURE — 87210 SMEAR WET MOUNT SALINE/INK: CPT

## 2023-04-12 PROCEDURE — 87491 CHLMYD TRACH DNA AMP PROBE: CPT

## 2023-04-12 PROCEDURE — 87591 N.GONORRHOEAE DNA AMP PROB: CPT

## 2023-04-12 PROCEDURE — 99214 OFFICE O/P EST MOD 30 MIN: CPT | Mod: VID | Performed by: PEDIATRICS

## 2023-04-12 RX ORDER — DOCUSATE SODIUM 100 MG/1
100 CAPSULE, LIQUID FILLED ORAL DAILY
Qty: 100 CAPSULE | Refills: 1 | Status: SHIPPED | OUTPATIENT
Start: 2023-04-12 | End: 2024-01-29

## 2023-04-12 NOTE — PROGRESS NOTES
"Madelin is a 17 year old who is being evaluated via a billable video visit.      How would you like to obtain your AVS? MyChart  If the video visit is dropped, the invitation should be resent by: Text to cell phone: [phone]  Will anyone else be joining your video visit? No          Assessment & Plan     Screen for STD (sexually transmitted disease)  - Chlamydia trachomatis PCR; Future  - Neisseria gonorrhoeae PCR; Future  - PRIMARY CARE FOLLOW-UP SCHEDULING; Future    Constipation, unspecified constipation type  - docusate sodium (COLACE) 100 MG capsule; Take 1 capsule (100 mg) by mouth daily  - Sennosides 25 MG TABS; Take 1 tablet (25 mg) by mouth daily as needed (no stool for 2 days)    History of vaginitis  - Wet prep - lab collect; Future  Patient education provided, including expected course of illness and symptoms that may occur which would require urgent evalution.            BMI:   Estimated body mass index is 27.52 kg/m  as calculated from the following:    Height as of 3/7/23: 5' 3.75\" (1.619 m).    Weight as of 3/7/23: 159 lb 1.6 oz (72.2 kg).           Alem Nagel MD  [facility]    Subjective   Madelin is a 17 year old, presenting for the following health issues:  STD        4/12/2023    11:17 AM   Additional Questions   Roomed by Odette BORGES     Patient would like STD testing  She has been tested 2 weeks ago and was negative, but had a new sexual exposure and would like testing again.  She has not had any symptoms.  She also had bacterial vaginosis recently, and completed her course of metronidazole.  She would like re-testing to verify that the infection is gone.    She has been struggling with constipation on and off.  Miralax does not work for her.  Senna does but it is also painful for her to use it.  Docusate helps a little but not as much as senna.  She has now not stooled in 3 days, and would like to resume her medications (unclear how long she has been off " them.)      Review of Systems   Constitutional, HEENT, cardiovascular, pulmonary, gi and gu systems are negative, except as otherwise noted.      Objective           Vitals:  No vitals were obtained today due to virtual visit.    Physical Exam   GENERAL: Healthy, alert and no distress  EYES: Eyes grossly normal to inspection.  No discharge or erythema, or obvious scleral/conjunctival abnormalities.  RESP: No audible wheeze, cough, or visible cyanosis.  No visible retractions or increased work of breathing.    SKIN: Visible skin clear. No significant rash, abnormal pigmentation or lesions.  NEURO: Cranial nerves grossly intact.  Mentation and speech appropriate for age.  PSYCH: Mentation appears normal, affect normal/bright, judgement and insight intact, normal speech and appearance well-groomed.                Video-Visit Details    Type of service:  Video Visit     Originating Location (pt. Location): Home    Distant Location (provider location):  On-site  Platform used for Video Visit: [employer]   Video start time: 11:23 AM  Video end time: 11:40 AM

## 2023-04-13 ENCOUNTER — TELEPHONE (OUTPATIENT)
Dept: PEDIATRICS | Facility: CLINIC | Age: 18
End: 2023-04-13
Payer: COMMERCIAL

## 2023-04-13 DIAGNOSIS — K59.00 CONSTIPATION, UNSPECIFIED CONSTIPATION TYPE: Primary | ICD-10-CM

## 2023-04-13 LAB
C TRACH DNA SPEC QL NAA+PROBE: NEGATIVE
N GONORRHOEA DNA SPEC QL NAA+PROBE: NEGATIVE

## 2023-04-13 NOTE — TELEPHONE ENCOUNTER
Pharmacy calling stating that they do not have sennoside 25mg. They only carry 15mg chocolate chews which are OTC. Please advise.     Maida VITALE RN  [facility] Triage Team

## 2023-04-13 NOTE — RESULT ENCOUNTER NOTE
Negative GC testing- normal    Latricia Pereira MD on 4/13/2023 at 11:07 AM  
Tdap Vaccination (VIS Pub Date: 2012)/  Immunization Record/Discharge Medication Information for Patients and Families Pocket Guide/Vaccinations/Breastfeeding Log/Breastfeeding Mother’s Support Group Information/Guide to Postpartum Care/[facility] Hearing Screen Program/Breastfeeding Guide and Packet/[facility]  Screening Program/Birth Certificate Instructions

## 2023-06-05 ENCOUNTER — VIRTUAL VISIT (OUTPATIENT)
Dept: PEDIATRICS | Facility: CLINIC | Age: 18
End: 2023-06-05
Payer: COMMERCIAL

## 2023-06-05 DIAGNOSIS — Z11.3 SCREEN FOR STD (SEXUALLY TRANSMITTED DISEASE): Primary | ICD-10-CM

## 2023-06-05 DIAGNOSIS — Z97.5 NEXPLANON IN PLACE: ICD-10-CM

## 2023-06-05 PROCEDURE — 99213 OFFICE O/P EST LOW 20 MIN: CPT | Mod: VID | Performed by: PEDIATRICS

## 2023-06-05 NOTE — PROGRESS NOTES
"Madelin is a 17 year old who is being evaluated via a billable video visit.      How would you like to obtain your AVS? MyChart  If the video visit is dropped, the invitation should be resent by: Text to cell phone: [phone]  Will anyone else be joining your video visit? No          Assessment & Plan     Screen for STD (sexually transmitted disease)  - Chlamydia trachomatis PCR  - Neisseria gonorrhoeae PCR - Clinic Collect    Nexplanon in place  Bleeding now resolved.             BMI:   Estimated body mass index is 27.52 kg/m  as calculated from the following:    Height as of 3/7/23: 5' 3.75\" (1.619 m).    Weight as of 3/7/23: 159 lb 1.6 oz (72.2 kg).       Follow up at next well check in March 2024  Alem Nagel MD  [facility]    Subjective   Madelin is a 17 year old, presenting for the following health issues:  STD        6/5/2023    12:09 PM   Additional Questions   Roomed by Annette   Accompanied by N/A     HPI     Patient wanting orders for G/C urine testing, and wanting to follow up on birth control has been having bleeding concerns ever since having it replaced     She has a new sexual partner with whom she uses condoms intermittently.  No known exposures and no symptoms, but would like routine gonorrhea and chlamydia testing.    Had Nexplanon placed Jan 30 2023 and had daily vaginal bleeding for 2-3 months after but now it has stopped and she has no bleeding.      Review of Systems   Constitutional, HEENT, cardiovascular, pulmonary, gi and gu systems are negative, except as otherwise noted.      Objective           Vitals:  No vitals were obtained today due to virtual visit.    Physical Exam   GENERAL: Healthy, alert and no distress  EYES: Eyes grossly normal to inspection.  No discharge or erythema, or obvious scleral/conjunctival abnormalities.  RESP: No audible wheeze, cough, or visible cyanosis.  No visible retractions or increased work of breathing.    SKIN: Visible skin " clear. No significant rash, abnormal pigmentation or lesions.  NEURO: Cranial nerves grossly intact.  Mentation and speech appropriate for age.  PSYCH: Mentation appears normal, affect normal/bright, judgement and insight intact, normal speech and appearance well-groomed.            Video-Visit Details    Type of service:  Video Visit     Originating Location (pt. Location): Home    Distant Location (provider location):  On-site  Platform used for Video Visit: [employer]   Video start time: 12:26 PM  Video end time: 12:36 PM

## 2023-06-06 ENCOUNTER — LAB (OUTPATIENT)
Dept: LAB | Facility: CLINIC | Age: 18
End: 2023-06-06
Attending: PEDIATRICS
Payer: COMMERCIAL

## 2023-06-06 DIAGNOSIS — Z11.3 SCREEN FOR STD (SEXUALLY TRANSMITTED DISEASE): ICD-10-CM

## 2023-06-06 PROCEDURE — 87491 CHLMYD TRACH DNA AMP PROBE: CPT

## 2023-06-06 PROCEDURE — 87591 N.GONORRHOEAE DNA AMP PROB: CPT

## 2023-06-07 LAB
C TRACH DNA SPEC QL NAA+PROBE: NEGATIVE
N GONORRHOEA DNA SPEC QL NAA+PROBE: NEGATIVE

## 2023-07-17 ENCOUNTER — VIRTUAL VISIT (OUTPATIENT)
Dept: PEDIATRICS | Facility: CLINIC | Age: 18
End: 2023-07-17
Payer: COMMERCIAL

## 2023-07-17 ENCOUNTER — MYC MEDICAL ADVICE (OUTPATIENT)
Dept: PEDIATRICS | Facility: CLINIC | Age: 18
End: 2023-07-17

## 2023-07-17 ENCOUNTER — LAB (OUTPATIENT)
Dept: LAB | Facility: CLINIC | Age: 18
End: 2023-07-17
Payer: COMMERCIAL

## 2023-07-17 DIAGNOSIS — R89.9 ABNORMAL LABORATORY TEST: Primary | ICD-10-CM

## 2023-07-17 DIAGNOSIS — Z11.3 SCREEN FOR STD (SEXUALLY TRANSMITTED DISEASE): Primary | ICD-10-CM

## 2023-07-17 DIAGNOSIS — Z11.3 SCREEN FOR STD (SEXUALLY TRANSMITTED DISEASE): ICD-10-CM

## 2023-07-17 PROCEDURE — 99213 OFFICE O/P EST LOW 20 MIN: CPT | Mod: VID | Performed by: PEDIATRICS

## 2023-07-17 PROCEDURE — 87591 N.GONORRHOEAE DNA AMP PROB: CPT

## 2023-07-17 PROCEDURE — 87210 SMEAR WET MOUNT SALINE/INK: CPT | Mod: VID | Performed by: PEDIATRICS

## 2023-07-17 PROCEDURE — 87491 CHLMYD TRACH DNA AMP PROBE: CPT

## 2023-07-17 NOTE — PROGRESS NOTES
"Madelin is a 18 year old who is being evaluated via a billable video visit.      How would you like to obtain your AVS? MyChart  If the video visit is dropped, the invitation should be resent by: Text to cell phone: [phone]  Will anyone else be joining your video visit? No          Assessment & Plan     Screen for STD (sexually transmitted disease)  - PRIMARY CARE FOLLOW-UP SCHEDULING; Future  - Wet prep - Clinic Collect  - Neisseria gonorrhoeae PCR; Future  - Chlamydia trachomatis PCR; Future    Review of external notes as documented elsewhere in note         BMI:   Estimated body mass index is 27.52 kg/m  as calculated from the following:    Height as of 3/7/23: 5' 3.75\" (1.619 m).    Weight as of 3/7/23: 159 lb 1.6 oz (72.2 kg).           Alem Nagel MD  [facility]    Víctor Yusuf is a 18 year old, presenting for the following health issues:  STD        7/17/2023    10:22 AM   Additional Questions   Roomed by Annette   Accompanied by N/A     STD    History of Present Illness       Reason for visit:  Schedule lab appointment for std check up    She eats 2-3 servings of fruits and vegetables daily.She consumes 1 sweetened beverage(s) daily.She exercises with enough effort to increase her heart rate 9 or less minutes per day.  She exercises with enough effort to increase her heart rate 3 or less days per week.   She is taking medications regularly.     Madelin has a new partner and would like STD testing.  NO symptoms.  She is also concerned about bacterial vaginosis, since she has had it in the past, and would like wet prep testing as well.  NO fevers, no changes to her discharge.  Nexplanon in place.     Review of Systems   Constitutional, HEENT, cardiovascular, pulmonary, gi and gu systems are negative, except as otherwise noted.      Objective           Vitals:  No vitals were obtained today due to virtual visit.    Physical Exam   GENERAL: Healthy, alert and no " distress  EYES: Eyes grossly normal to inspection.  No discharge or erythema, or obvious scleral/conjunctival abnormalities.  RESP: No audible wheeze, cough, or visible cyanosis.  No visible retractions or increased work of breathing.    SKIN: Visible skin clear. No significant rash, abnormal pigmentation or lesions.  NEURO: Cranial nerves grossly intact.  Mentation and speech appropriate for age.  PSYCH: Mentation appears normal, affect normal/bright, judgement and insight intact, normal speech and appearance well-groomed.      Diagnostics: pending          Video-Visit Details    Type of service:  Video Visit     Originating Location (pt. Location): Home    Distant Location (provider location):  On-site  Platform used for Video Visit: [employer]   Video start time: 10:36 AM   Video end time:10:43 AM

## 2023-07-18 LAB
C TRACH DNA SPEC QL NAA+PROBE: NEGATIVE
N GONORRHOEA DNA SPEC QL NAA+PROBE: NEGATIVE

## 2023-08-07 ENCOUNTER — MYC MEDICAL ADVICE (OUTPATIENT)
Dept: PEDIATRICS | Facility: CLINIC | Age: 18
End: 2023-08-07
Payer: COMMERCIAL

## 2023-08-07 DIAGNOSIS — K59.00 CONSTIPATION, UNSPECIFIED CONSTIPATION TYPE: ICD-10-CM

## 2023-08-22 NOTE — NURSING NOTE
"Chief Complaint   Patient presents with     Neck Problem     swollen       Initial /74 (Cuff Size: Adult Regular)  Pulse 80  Temp 98.2  F (36.8  C) (Oral)  Ht 5' 4.5\" (1.638 m)  Wt 133 lb 12.8 oz (60.7 kg)  SpO2 99%  BMI 22.61 kg/m2 Estimated body mass index is 22.61 kg/(m^2) as calculated from the following:    Height as of this encounter: 5' 4.5\" (1.638 m).    Weight as of this encounter: 133 lb 12.8 oz (60.7 kg).  Medication Reconciliation: complete    " Opzelura Pregnancy And Lactation Text: There is insufficient data to evaluate drug-associated risk for major birth defects, miscarriage, or other adverse maternal or fetal outcomes.  There is a pregnancy registry that monitors pregnancy outcomes in pregnant persons exposed to the medication during pregnancy.  It is unknown if this medication is excreted in breast milk.  Do not breastfeed during treatment and for about 4 weeks after the last dose.

## 2023-10-02 ENCOUNTER — VIRTUAL VISIT (OUTPATIENT)
Dept: PEDIATRICS | Facility: CLINIC | Age: 18
End: 2023-10-02
Payer: COMMERCIAL

## 2023-10-02 DIAGNOSIS — Z11.3 SCREEN FOR STD (SEXUALLY TRANSMITTED DISEASE): Primary | ICD-10-CM

## 2023-10-02 PROCEDURE — 99213 OFFICE O/P EST LOW 20 MIN: CPT | Mod: VID | Performed by: PEDIATRICS

## 2023-10-02 NOTE — PROGRESS NOTES
"Madelin is a 18 year old who is being evaluated via a billable video visit.      How would you like to obtain your AVS? MyChart  If the video visit is dropped, the invitation should be resent by: Text to cell phone: [phone]  Will anyone else be joining your video visit? No          Assessment & Plan     Screen for STD (sexually transmitted disease)  - Chlamydia trachomatis PCR  - Neisseria gonorrhoeae PCR  - wet prep (previously ordered)  Patient education provided, including expected course of illness and symptoms that may occur which would require urgent evalution.   Follow up prn or at next well child check.        BMI:   Estimated body mass index is 27.52 kg/m  as calculated from the following:    Height as of 3/7/23: 5' 3.75\" (1.619 m).    Weight as of 3/7/23: 159 lb 1.6 oz (72.2 kg).           Alem Nagel MD  [facility]    Víctor Yusuf is a 18 year old, presenting for the following health issues:  STD      10/2/2023     9:18 AM   Additional Questions   Roomed by Annette       STD    History of Present Illness       Reason for visit:  Std testing    She eats 2-3 servings of fruits and vegetables daily.She consumes 1 sweetened beverage(s) daily.She exercises with enough effort to increase her heart rate 9 or less minutes per day.  She exercises with enough effort to increase her heart rate 3 or less days per week.   She is taking medications regularly.     Madelin would like a routine STD check.  She just completed a relationship and wants to make sure she is free of infection. No symptoms, but she has had positive testing without symptoms int he past.  She is particularly concerned about Chlamydia and BV,          Review of Systems   Constitutional, HEENT, cardiovascular, pulmonary, gi and gu systems are negative, except as otherwise noted.      Objective           Vitals:  No vitals were obtained today due to virtual visit.    Physical Exam   GENERAL: Healthy, alert and " no distress  EYES: Eyes grossly normal to inspection.  No discharge or erythema, or obvious scleral/conjunctival abnormalities.  RESP: No audible wheeze, cough, or visible cyanosis.  No visible retractions or increased work of breathing.    SKIN: Visible skin clear. No significant rash, abnormal pigmentation or lesions.  NEURO: Cranial nerves grossly intact.  Mentation and speech appropriate for age.  PSYCH: Mentation appears normal, affect normal/bright, judgement and insight intact, normal speech and appearance well-groomed.    Diagnostics pending.            Video-Visit Details    Type of service:  Video Visit     Originating Location (pt. Location): Home    Distant Location (provider location):  On-site  Platform used for Video Visit: [employer]  Video start time: 9:45 AM  Video end time: 9:53 AM

## 2023-10-04 ENCOUNTER — LAB (OUTPATIENT)
Dept: LAB | Facility: CLINIC | Age: 18
End: 2023-10-04
Payer: COMMERCIAL

## 2023-10-04 ENCOUNTER — MYC MEDICAL ADVICE (OUTPATIENT)
Dept: PEDIATRICS | Facility: CLINIC | Age: 18
End: 2023-10-04

## 2023-10-04 ENCOUNTER — TELEPHONE (OUTPATIENT)
Dept: PEDIATRICS | Facility: CLINIC | Age: 18
End: 2023-10-04

## 2023-10-04 DIAGNOSIS — N76.0 BACTERIAL VAGINOSIS: Primary | ICD-10-CM

## 2023-10-04 DIAGNOSIS — B96.89 BACTERIAL VAGINOSIS: Primary | ICD-10-CM

## 2023-10-04 DIAGNOSIS — R89.9 ABNORMAL LABORATORY TEST: ICD-10-CM

## 2023-10-04 DIAGNOSIS — Z11.3 SCREEN FOR STD (SEXUALLY TRANSMITTED DISEASE): ICD-10-CM

## 2023-10-04 PROCEDURE — 87591 N.GONORRHOEAE DNA AMP PROB: CPT

## 2023-10-04 PROCEDURE — 87210 SMEAR WET MOUNT SALINE/INK: CPT

## 2023-10-04 PROCEDURE — 87491 CHLMYD TRACH DNA AMP PROBE: CPT

## 2023-10-04 RX ORDER — METRONIDAZOLE 7.5 MG/G
GEL TOPICAL
Qty: 45 G | Refills: 0 | Status: SHIPPED | OUTPATIENT
Start: 2023-10-04

## 2023-10-04 RX ORDER — METRONIDAZOLE 7.5 MG/G
1 GEL VAGINAL DAILY
Qty: 70 G | Refills: 0 | Status: SHIPPED | OUTPATIENT
Start: 2023-10-04

## 2023-10-04 NOTE — TELEPHONE ENCOUNTER
Rx written as requested, pharmacy to notify patient.    Electronically signed by:  Alem Nagel MD  Pediatrics  [facility]

## 2023-10-04 NOTE — TELEPHONE ENCOUNTER
Received a call from the pharmacist wondering if she can change the script to Metronidazole Vaginal Gel versus the External Gel?    If so, medication pended for review OR pharmacy can take verbal approval to change the medication.     Will route to PCP HP since patient is currently at the pharmacy.    Thank you,  Tonia Turner RN

## 2023-10-04 NOTE — LETTER
October 5, 2023      Madelin JADA Grabiel  [address]   [facility] [address]        Dear ,    We are writing to inform you of your test results.    Your test results fall within the expected range(s) or remain unchanged from previous results.  Please continue with current treatment plan.    Resulted Orders   Wet prep - lab collect   Result Value Ref Range    Trichomonas Absent Absent    Yeast Absent Absent    Clue Cells Present (A) Absent    WBCs/high power field 2+ (A) None   Chlamydia trachomatis PCR   Result Value Ref Range    Chlamydia trachomatis Negative Negative      Comment:      A negative result by transcription mediated amplification does not preclude the presence of C. trachomatis infection because results are dependent on proper and adequate collection, absence of inhibitors and sufficient rRNA to be detected.   Neisseria gonorrhoeae PCR   Result Value Ref Range    Neisseria gonorrhoeae Negative Negative      Comment:      Negative for N. gonorrhoeae rRNA by transcription mediated amplification. A negative result by transcription mediated amplification does not preclude the presence of C. trachomatis infection because results are dependent on proper and adequate collection, absence of inhibitors and sufficient rRNA to be detected.       If you have any questions or concerns, please call the clinic at the number listed above.       Sincerely,      Alem Nagel MD

## 2023-10-05 LAB
C TRACH DNA SPEC QL NAA+PROBE: NEGATIVE
N GONORRHOEA DNA SPEC QL NAA+PROBE: NEGATIVE

## 2023-11-08 ENCOUNTER — VIRTUAL VISIT (OUTPATIENT)
Dept: PEDIATRICS | Facility: CLINIC | Age: 18
End: 2023-11-08
Payer: COMMERCIAL

## 2023-11-08 ENCOUNTER — LAB (OUTPATIENT)
Dept: LAB | Facility: CLINIC | Age: 18
End: 2023-11-08
Payer: COMMERCIAL

## 2023-11-08 DIAGNOSIS — Z20.2 POTENTIAL EXPOSURE TO STD: Primary | ICD-10-CM

## 2023-11-08 DIAGNOSIS — Z20.2 POTENTIAL EXPOSURE TO STD: ICD-10-CM

## 2023-11-08 PROCEDURE — 87491 CHLMYD TRACH DNA AMP PROBE: CPT

## 2023-11-08 PROCEDURE — 99213 OFFICE O/P EST LOW 20 MIN: CPT | Mod: VID | Performed by: PEDIATRICS

## 2023-11-08 PROCEDURE — 87591 N.GONORRHOEAE DNA AMP PROB: CPT

## 2023-11-08 NOTE — PROGRESS NOTES
"Madelin is a 18 year old who is being evaluated via a billable video visit.      How would you like to obtain your AVS? MyChart  If the video visit is dropped, the invitation should be resent by: Text to cell phone: [phone]  Will anyone else be joining your video visit? No          Assessment & Plan     Potential exposure to STD  Recommend condom use  - NEISSERIA GONORRHOEA PCR  - CHLAMYDIA TRACHOMATIS PCR               BMI:   Estimated body mass index is 27.52 kg/m  as calculated from the following:    Height as of 3/7/23: 5' 3.75\" (1.619 m).    Weight as of 3/7/23: 159 lb 1.6 oz (72.2 kg).           Alem Nagel MD  [facility]    Subjective   Madelin is a 18 year old, presenting for the following health issues:  STD      11/8/2023    11:37 AM   Additional Questions   Roomed by Odette   Accompanied by self       STD    History of Present Illness       Reason for visit:  Std check up    She eats 0-1 servings of fruits and vegetables daily.She consumes 1 sweetened beverage(s) daily.She exercises with enough effort to increase her heart rate 9 or less minutes per day.  She exercises with enough effort to increase her heart rate 3 or less days per week.   She is taking medications regularly.     Madelin had an unprotected sexual encounter with a new partner 1 week ago.  She has no ill symptoms.  She has a history of chlamydia infections with no symptoms, and so would like to be tested.  She has been treated successfully, and has had many negative tests since the infection.            Review of Systems   Constitutional, HEENT, cardiovascular, pulmonary, gi and gu systems are negative, except as otherwise noted.      Objective           Vitals:  No vitals were obtained today due to virtual visit.    Physical Exam   GENERAL: Healthy, alert and no distress  RESP: No audible wheeze, cough, or visible cyanosis.    PSYCH: Mentation appears normal, affect normal/bright, judgement and insight " intact, normal speech and                 Video-Visit Details    Type of service:  Video Visit     Originating Location (pt. Location): Home    Distant Location (provider location):  On-site  Platform used for Video Visit: [employer]  Video start time: 11:53 AM  Video end time: 11:57 AM

## 2023-11-09 LAB
C TRACH DNA SPEC QL NAA+PROBE: NEGATIVE
N GONORRHOEA DNA SPEC QL NAA+PROBE: NEGATIVE

## 2024-01-12 ENCOUNTER — IMMUNIZATION (OUTPATIENT)
Dept: NURSING | Facility: CLINIC | Age: 19
End: 2024-01-12
Payer: COMMERCIAL

## 2024-01-12 PROCEDURE — 90471 IMMUNIZATION ADMIN: CPT

## 2024-01-12 PROCEDURE — 90480 ADMN SARSCOV2 VAC 1/ONLY CMP: CPT

## 2024-01-12 PROCEDURE — 91320 SARSCV2 VAC 30MCG TRS-SUC IM: CPT

## 2024-01-12 PROCEDURE — 90686 IIV4 VACC NO PRSV 0.5 ML IM: CPT

## 2024-01-29 ENCOUNTER — MYC REFILL (OUTPATIENT)
Dept: PEDIATRICS | Facility: CLINIC | Age: 19
End: 2024-01-29
Payer: COMMERCIAL

## 2024-01-29 DIAGNOSIS — K59.00 CONSTIPATION, UNSPECIFIED CONSTIPATION TYPE: ICD-10-CM

## 2024-01-29 RX ORDER — DOCUSATE SODIUM 100 MG/1
100 CAPSULE, LIQUID FILLED ORAL DAILY
Qty: 100 CAPSULE | Refills: 1 | Status: SHIPPED | OUTPATIENT
Start: 2024-01-29 | End: 2024-04-19

## 2024-04-13 ENCOUNTER — HEALTH MAINTENANCE LETTER (OUTPATIENT)
Age: 19
End: 2024-04-13

## 2024-04-19 ENCOUNTER — OFFICE VISIT (OUTPATIENT)
Dept: PEDIATRICS | Facility: CLINIC | Age: 19
End: 2024-04-19
Payer: COMMERCIAL

## 2024-04-19 VITALS
SYSTOLIC BLOOD PRESSURE: 122 MMHG | TEMPERATURE: 97.8 F | DIASTOLIC BLOOD PRESSURE: 67 MMHG | BODY MASS INDEX: 35.37 KG/M2 | OXYGEN SATURATION: 100 % | WEIGHT: 207.2 LBS | HEIGHT: 64 IN | RESPIRATION RATE: 16 BRPM | HEART RATE: 75 BPM

## 2024-04-19 DIAGNOSIS — N92.1 BREAKTHROUGH BLEEDING ON NEXPLANON: ICD-10-CM

## 2024-04-19 DIAGNOSIS — E78.00 HYPERCHOLESTEREMIA: ICD-10-CM

## 2024-04-19 DIAGNOSIS — K59.00 CONSTIPATION, UNSPECIFIED CONSTIPATION TYPE: ICD-10-CM

## 2024-04-19 DIAGNOSIS — Z97.5 BREAKTHROUGH BLEEDING ON NEXPLANON: ICD-10-CM

## 2024-04-19 DIAGNOSIS — Z00.00 ROUTINE GENERAL MEDICAL EXAMINATION AT A HEALTH CARE FACILITY: Primary | ICD-10-CM

## 2024-04-19 DIAGNOSIS — Z97.5 NEXPLANON IN PLACE: ICD-10-CM

## 2024-04-19 PROCEDURE — 87591 N.GONORRHOEAE DNA AMP PROB: CPT | Performed by: PEDIATRICS

## 2024-04-19 PROCEDURE — 87491 CHLMYD TRACH DNA AMP PROBE: CPT | Performed by: PEDIATRICS

## 2024-04-19 PROCEDURE — 99395 PREV VISIT EST AGE 18-39: CPT | Performed by: PEDIATRICS

## 2024-04-19 RX ORDER — DOCUSATE SODIUM 100 MG/1
100 CAPSULE, LIQUID FILLED ORAL DAILY
Qty: 100 CAPSULE | Refills: 3 | Status: SHIPPED | OUTPATIENT
Start: 2024-04-19

## 2024-04-19 SDOH — HEALTH STABILITY: PHYSICAL HEALTH: ON AVERAGE, HOW MANY MINUTES DO YOU ENGAGE IN EXERCISE AT THIS LEVEL?: 30 MIN

## 2024-04-19 SDOH — HEALTH STABILITY: PHYSICAL HEALTH: ON AVERAGE, HOW MANY DAYS PER WEEK DO YOU ENGAGE IN MODERATE TO STRENUOUS EXERCISE (LIKE A BRISK WALK)?: 6 DAYS

## 2024-04-19 ASSESSMENT — SOCIAL DETERMINANTS OF HEALTH (SDOH): HOW OFTEN DO YOU GET TOGETHER WITH FRIENDS OR RELATIVES?: ONCE A WEEK

## 2024-04-19 NOTE — PATIENT INSTRUCTIONS
Preventive Care Advice   This is general advice given by our system to help you stay healthy. However, your care team may have specific advice just for you. Please talk to your care team about your preventive care needs.  Nutrition  Eat 5 or more servings of fruits and vegetables each day.  Try wheat bread, brown rice and whole grain pasta (instead of white bread, rice, and pasta).  Get enough calcium and vitamin D. Check the label on foods and aim for 100% of the RDA (recommended daily allowance).  Lifestyle  Exercise at least 150 minutes each week   (30 minutes a day, 5 days a week).  Do muscle strengthening activities 2 days a week. These help control your weight and prevent disease.  No smoking.  Wear sunscreen to prevent skin cancer.  Have a dental exam and cleaning every 6 months.  Yearly exams  See your health care team every year to talk about:  Any changes in your health.  Any medicines your care team has prescribed.  Preventive care, family planning, and ways to prevent chronic diseases.  Shots (vaccines)   HPV shots (up to age 26), if you've never had them before.  Hepatitis B shots (up to age 59), if you've never had them before.  COVID-19 shot: Get this shot when it's due.  Flu shot: Get a flu shot every year.  Tetanus shot: Get a tetanus shot every 10 years.  Pneumococcal, hepatitis A, and RSV shots: Ask your care team if you need these based on your risk.  Shingles shot (for age 50 and up).  General health tests  Diabetes screening:  Starting at age 35, Get screened for diabetes at least every 3 years.  If you are younger than age 35, ask your care team if you should be screened for diabetes.  Cholesterol test: At age 39, start having a cholesterol test every 5 years, or more often if advised.  Bone density scan (DEXA): At age 50, ask your care team if you should have this scan for osteoporosis (brittle bones).  Hepatitis C: Get tested at least once in your life.  STIs (sexually transmitted  infections)  Before age 24: Ask your care team if you should be screened for STIs.  After age 24: Get screened for STIs if you're at risk. You are at risk for STIs (including HIV) if:  You are sexually active with more than one person.  You don't use condoms every time.  You or a partner was diagnosed with a sexually transmitted infection.  If you are at risk for HIV, ask about PrEP medicine to prevent HIV.  Get tested for HIV at least once in your life, whether you are at risk for HIV or not.  Cancer screening tests  Cervical cancer screening: If you have a cervix, begin getting regular cervical cancer screening tests at age 21. Most people who have regular screenings with normal results can stop after age 65. Talk about this with your provider.  Breast cancer scan (mammogram): If you've ever had breasts, begin having regular mammograms starting at age 40. This is a scan to check for breast cancer.  Colon cancer screening: It is important to start screening for colon cancer at age 45.  Have a colonoscopy test every 10 years (or more often if you're at risk) Or, ask your provider about stool tests like a FIT test every year or Cologuard test every 3 years.  To learn more about your testing options, visit: https://www.BasisCode/026110.pdf.  For help making a decision, visit: https://bit.ly/dv44267.  Prostate cancer screening test: If you have a prostate and are age 55 to 69, ask your provider if you would benefit from a yearly prostate cancer screening test.  Lung cancer screening: If you are a current or former smoker age 50 to 80, ask your care team if ongoing lung cancer screenings are right for you.  For informational purposes only. Not to replace the advice of your health care provider. Copyright   2023 [employer]. All rights reserved. Clinically reviewed by the [facility]. Loom [id number] - REV 01/24.    Learning About Stress  What is stress?     Stress is your  body's response to a hard situation. Your body can have a physical, emotional, or mental response. Stress is a fact of life for most people, and it affects everyone differently. What causes stress for you may not be stressful for someone else.  A lot of things can cause stress. You may feel stress when you go on a job interview, take a test, or run a race. This kind of short-term stress is normal and even useful. It can help you if you need to work hard or react quickly. For example, stress can help you finish an important job on time.  Long-term stress is caused by ongoing stressful situations or events. Examples of long-term stress include long-term health problems, ongoing problems at work, or conflicts in your family. Long-term stress can harm your health.  How does stress affect your health?  When you are stressed, your body responds as though you are in danger. It makes hormones that speed up your heart, make you breathe faster, and give you a burst of energy. This is called the fight-or-flight stress response. If the stress is over quickly, your body goes back to normal and no harm is done.  But if stress happens too often or lasts too long, it can have bad effects. Long-term stress can make you more likely to get sick, and it can make symptoms of some diseases worse. If you tense up when you are stressed, you may develop neck, shoulder, or low back pain. Stress is linked to high blood pressure and heart disease.  Stress also harms your emotional health. It can make you martin, tense, or depressed. Your relationships may suffer, and you may not do well at work or school.  What can you do to manage stress?  You can try these things to help manage stress:   Do something active. Exercise or activity can help reduce stress. Walking is a great way to get started. Even everyday activities such as housecleaning or yard work can help.  Try yoga or znadra chi. These techniques combine exercise and meditation. You may need  some training at first to learn them.  Do something you enjoy. For example, listen to music or go to a movie. Practice your hobby or do volunteer work.  Meditate. This can help you relax, because you are not worrying about what happened before or what may happen in the future.  Do guided imagery. Imagine yourself in any setting that helps you feel calm. You can use online videos, books, or a teacher to guide you.  Do breathing exercises. For example:  From a standing position, bend forward from the waist with your knees slightly bent. Let your arms dangle close to the floor.  Breathe in slowly and deeply as you return to a standing position. Roll up slowly and lift your head last.  Hold your breath for just a few seconds in the standing position.  Breathe out slowly and bend forward from the waist.  Let your feelings out. Talk, laugh, cry, and express anger when you need to. Talking with supportive friends or family, a counselor, or a tera leader about your feelings is a healthy way to relieve stress. Avoid discussing your feelings with people who make you feel worse.  Write. It may help to write about things that are bothering you. This helps you find out how much stress you feel and what is causing it. When you know this, you can find better ways to cope.  What can you do to prevent stress?  You might try some of these things to help prevent stress:  Manage your time. This helps you find time to do the things you want and need to do.  Get enough sleep. Your body recovers from the stresses of the day while you are sleeping.  Get support. Your family, friends, and community can make a difference in how you experience stress.  Limit your news feed. Avoid or limit time on social media or news that may make you feel stressed.  Do something active. Exercise or activity can help reduce stress. Walking is a great way to get started.  Where can you learn more?  Go to https://www.healthwise.net/patiented  Enter N032 in the  "search box to learn more about \"Learning About Stress.\"  Current as of: October 24, 2023               Content Version: 14.0    4572-6021 [employer].   Care instructions adapted under license by your healthcare professional. If you have questions about a medical condition or this instruction, always ask your healthcare professional. [employer] disclaims any warranty or liability for your use of this information.      Safer Sex: Care Instructions  Overview  Safer sex is a way to reduce your risk of getting a sexually transmitted infection (STI). It can also help prevent pregnancy.  Several products can help you practice safer sex and reduce your chance of STIs. One of the best is a condom. There are internal and external condoms. You can use a special rubber sheet (dental dam) for protection during oral sex. Disposable gloves can keep your hands from touching blood, semen, or other body fluids that can carry infections.  Remember that birth control methods such as diaphragms, IUDs, foams, and birth control pills do not stop you from getting STIs.  Follow-up care is a key part of your treatment and safety. Be sure to make and go to all appointments, and call your doctor if you are having problems. It's also a good idea to know your test results and keep a list of the medicines you take.  How can you care for yourself at home?  Think about getting vaccinated to help prevent hepatitis A, hepatitis B, and human papillomavirus (HPV). They can be spread through sex.  Use a condom every time you have sex. Use an external condom, which goes on the penis. Or use an internal condom, which goes into the vagina or anus.  Make sure you use the right size external condom. A condom that's too small can break easily. A condom that's too big can slip off during sex.  Use a new condom each time you have sex. Be careful not to poke a hole in the condom when you open the wrapper.  Don't use an internal condom " "and an external condom at the same time.  Never use petroleum jelly (such as Vaseline), grease, hand lotion, baby oil, or anything with oil in it. These products can make holes in the condom.  After intercourse, hold the edge of the condom as you remove it. This will help keep semen from spilling out of the condom.  Do not have sex with anyone who has symptoms of an STI, such as sores on the genitals or mouth.  Do not drink a lot of alcohol or use drugs before sex.  Limit your sex partners. Sex with one partner who has sex only with you can reduce your risk of getting an STI.  Don't share sex toys. But if you do share them, use a condom and clean the sex toys between each use.  Talk to any partners before you have sex. Talk about what you feel comfortable with and whether you have any boundaries with sex. And find out if your partner or partners may be at risk for any STI. Keep in mind that a person may be able to spread an STI even if they do not have symptoms. You and any partners may want to get tested for STIs.  Where can you learn more?  Go to https://www.[employer].net/patiented  Enter B608 in the search box to learn more about \"Safer Sex: Care Instructions.\"  Current as of: November 27, 2023               Content Version: 14.0    9652-2942 [employer].   Care instructions adapted under license by your healthcare professional. If you have questions about a medical condition or this instruction, always ask your healthcare professional. [employer] disclaims any warranty or liability for your use of this information.      "

## 2024-04-20 LAB
C TRACH DNA SPEC QL NAA+PROBE: NEGATIVE
N GONORRHOEA DNA SPEC QL NAA+PROBE: NEGATIVE

## 2024-04-26 ENCOUNTER — LAB (OUTPATIENT)
Dept: LAB | Facility: CLINIC | Age: 19
End: 2024-04-26
Payer: COMMERCIAL

## 2024-04-26 DIAGNOSIS — Z00.00 ROUTINE GENERAL MEDICAL EXAMINATION AT A HEALTH CARE FACILITY: ICD-10-CM

## 2024-04-26 LAB
BASOPHILS # BLD AUTO: 0.1 10E3/UL (ref 0–0.2)
BASOPHILS NFR BLD AUTO: 1 %
CHOLEST SERPL-MCNC: 207 MG/DL
EOSINOPHIL # BLD AUTO: 0.2 10E3/UL (ref 0–0.7)
EOSINOPHIL NFR BLD AUTO: 2 %
ERYTHROCYTE [DISTWIDTH] IN BLOOD BY AUTOMATED COUNT: 11.7 % (ref 10–15)
FASTING STATUS PATIENT QL REPORTED: YES
HBA1C MFR BLD: 4.9 % (ref 0–5.6)
HCT VFR BLD AUTO: 40.7 % (ref 35–47)
HDLC SERPL-MCNC: 60 MG/DL
HGB BLD-MCNC: 13.6 G/DL (ref 11.7–15.7)
IMM GRANULOCYTES # BLD: 0 10E3/UL
IMM GRANULOCYTES NFR BLD: 0 %
LDLC SERPL CALC-MCNC: 128 MG/DL
LYMPHOCYTES # BLD AUTO: 2.8 10E3/UL (ref 0.8–5.3)
LYMPHOCYTES NFR BLD AUTO: 29 %
MCH RBC QN AUTO: 30.8 PG (ref 26.5–33)
MCHC RBC AUTO-ENTMCNC: 33.4 G/DL (ref 31.5–36.5)
MCV RBC AUTO: 92 FL (ref 78–100)
MONOCYTES # BLD AUTO: 0.7 10E3/UL (ref 0–1.3)
MONOCYTES NFR BLD AUTO: 8 %
NEUTROPHILS # BLD AUTO: 5.7 10E3/UL (ref 1.6–8.3)
NEUTROPHILS NFR BLD AUTO: 60 %
NONHDLC SERPL-MCNC: 147 MG/DL
NRBC # BLD AUTO: 0 10E3/UL
NRBC BLD AUTO-RTO: 0 /100
PLATELET # BLD AUTO: 296 10E3/UL (ref 150–450)
RBC # BLD AUTO: 4.42 10E6/UL (ref 3.8–5.2)
TRIGL SERPL-MCNC: 97 MG/DL
WBC # BLD AUTO: 9.5 10E3/UL (ref 4–11)

## 2024-04-26 PROCEDURE — 83036 HEMOGLOBIN GLYCOSYLATED A1C: CPT

## 2024-04-26 PROCEDURE — 85025 COMPLETE CBC W/AUTO DIFF WBC: CPT

## 2024-04-26 PROCEDURE — 80061 LIPID PANEL: CPT

## 2024-04-26 PROCEDURE — 36415 COLL VENOUS BLD VENIPUNCTURE: CPT

## 2024-09-20 ENCOUNTER — IMMUNIZATION (OUTPATIENT)
Dept: INTERNAL MEDICINE | Facility: CLINIC | Age: 19
End: 2024-09-20
Payer: COMMERCIAL

## 2024-09-20 PROCEDURE — 90656 IIV3 VACC NO PRSV 0.5 ML IM: CPT

## 2024-09-20 PROCEDURE — 90471 IMMUNIZATION ADMIN: CPT

## 2024-10-04 NOTE — PROGRESS NOTES
Nexplanon Removal:     Is a pregnancy test required: No.  Was a consent obtained?  Yes    Madelin Spain is here for removal of etonogestrel implant Nexplanon/Implanon    Indication: abnormal bleeding and weight gain      Preoperative Diagnosis: etonogestrel implant  Postoperative Diagnosis: etonogestrel implant removed    Technique: On the left arm  Skin prep Betadine  Anesthesia 1% lidocaine, with epi  Procedure: Small incision (<5mm) was made at distal end of palpable implant, curved hemostat or mosquito forceps was used to isolate the implant and bring it to the incision, the fibrous capsule containing the implant  was incised and the Implant was removed intact.      EBL: minimal  Complications:  No  Tolerance:  Pt tolerated procedure well and was in stable condition.   Dressing:    A pressure bandage was placed for the next 12-24 hours.    Contraception was discussed and patient chose the following method condoms      Follow up: Pt was instructed to call if bleeding, severe pain or foul smell.     Shannan Diamond PA-C

## 2024-10-07 ENCOUNTER — OFFICE VISIT (OUTPATIENT)
Dept: OBGYN | Facility: CLINIC | Age: 19
End: 2024-10-07
Attending: PEDIATRICS
Payer: COMMERCIAL

## 2024-10-07 VITALS
BODY MASS INDEX: 37.9 KG/M2 | SYSTOLIC BLOOD PRESSURE: 110 MMHG | HEIGHT: 64 IN | WEIGHT: 222 LBS | DIASTOLIC BLOOD PRESSURE: 64 MMHG

## 2024-10-07 DIAGNOSIS — E66.01 CLASS 2 SEVERE OBESITY DUE TO EXCESS CALORIES WITH SERIOUS COMORBIDITY AND BODY MASS INDEX (BMI) OF 38.0 TO 38.9 IN ADULT (H): ICD-10-CM

## 2024-10-07 DIAGNOSIS — Z30.46 ENCOUNTER FOR NEXPLANON REMOVAL: Primary | ICD-10-CM

## 2024-10-07 DIAGNOSIS — Z97.5 BREAKTHROUGH BLEEDING ON NEXPLANON: ICD-10-CM

## 2024-10-07 DIAGNOSIS — Z30.46 ENCOUNTER FOR REMOVAL AND REINSERTION OF NEXPLANON: ICD-10-CM

## 2024-10-07 DIAGNOSIS — E66.812 CLASS 2 SEVERE OBESITY DUE TO EXCESS CALORIES WITH SERIOUS COMORBIDITY AND BODY MASS INDEX (BMI) OF 38.0 TO 38.9 IN ADULT (H): ICD-10-CM

## 2024-10-07 DIAGNOSIS — N92.1 BREAKTHROUGH BLEEDING ON NEXPLANON: ICD-10-CM

## 2024-10-07 DIAGNOSIS — Z12.4 CERVICAL CANCER SCREENING: ICD-10-CM

## 2024-10-07 PROCEDURE — G0145 SCR C/V CYTO,THINLAYER,RESCR: HCPCS

## 2024-10-07 PROCEDURE — 87624 HPV HI-RISK TYP POOLED RSLT: CPT

## 2024-10-07 PROCEDURE — 11982 REMOVE DRUG IMPLANT DEVICE: CPT

## 2024-10-07 RX ORDER — LIDOCAINE HYDROCHLORIDE AND EPINEPHRINE 10; 10 MG/ML; UG/ML
5 INJECTION, SOLUTION INFILTRATION; PERINEURAL ONCE
Status: COMPLETED | OUTPATIENT
Start: 2024-10-07 | End: 2024-10-07

## 2024-10-07 RX ADMIN — LIDOCAINE HYDROCHLORIDE AND EPINEPHRINE 5 ML: 10; 10 INJECTION, SOLUTION INFILTRATION; PERINEURAL at 09:54

## 2024-10-08 LAB
HPV HR 12 DNA CVX QL NAA+PROBE: NEGATIVE
HPV16 DNA CVX QL NAA+PROBE: NEGATIVE
HPV18 DNA CVX QL NAA+PROBE: NEGATIVE
HUMAN PAPILLOMA VIRUS FINAL DIAGNOSIS: NORMAL

## 2024-10-10 LAB
BKR AP ASSOCIATED HPV REPORT: NORMAL
BKR LAB AP GYN ADEQUACY: NORMAL
BKR LAB AP GYN INTERPRETATION: NORMAL
BKR LAB AP PREVIOUS ABNORMAL: NORMAL
PATH REPORT.COMMENTS IMP SPEC: NORMAL
PATH REPORT.COMMENTS IMP SPEC: NORMAL
PATH REPORT.RELEVANT HX SPEC: NORMAL

## 2024-10-24 ENCOUNTER — OFFICE VISIT (OUTPATIENT)
Dept: OBGYN | Facility: CLINIC | Age: 19
End: 2024-10-24
Payer: COMMERCIAL

## 2024-10-24 VITALS — WEIGHT: 219.6 LBS | BODY MASS INDEX: 38.29 KG/M2 | DIASTOLIC BLOOD PRESSURE: 80 MMHG | SYSTOLIC BLOOD PRESSURE: 116 MMHG

## 2024-10-24 DIAGNOSIS — L73.9 FOLLICULITIS: Primary | ICD-10-CM

## 2024-10-24 PROCEDURE — 99459 PELVIC EXAMINATION: CPT

## 2024-10-24 PROCEDURE — 99213 OFFICE O/P EST LOW 20 MIN: CPT

## 2024-10-24 ASSESSMENT — ANXIETY QUESTIONNAIRES
1. FEELING NERVOUS, ANXIOUS, OR ON EDGE: NOT AT ALL
5. BEING SO RESTLESS THAT IT IS HARD TO SIT STILL: NOT AT ALL
6. BECOMING EASILY ANNOYED OR IRRITABLE: NOT AT ALL
GAD7 TOTAL SCORE: 0
3. WORRYING TOO MUCH ABOUT DIFFERENT THINGS: NOT AT ALL
GAD7 TOTAL SCORE: 0
2. NOT BEING ABLE TO STOP OR CONTROL WORRYING: NOT AT ALL
IF YOU CHECKED OFF ANY PROBLEMS ON THIS QUESTIONNAIRE, HOW DIFFICULT HAVE THESE PROBLEMS MADE IT FOR YOU TO DO YOUR WORK, TAKE CARE OF THINGS AT HOME, OR GET ALONG WITH OTHER PEOPLE: NOT DIFFICULT AT ALL
7. FEELING AFRAID AS IF SOMETHING AWFUL MIGHT HAPPEN: NOT AT ALL

## 2024-10-24 ASSESSMENT — PATIENT HEALTH QUESTIONNAIRE - PHQ9
SUM OF ALL RESPONSES TO PHQ QUESTIONS 1-9: 2
5. POOR APPETITE OR OVEREATING: NOT AT ALL

## 2024-10-24 NOTE — PROGRESS NOTES
SUBJECTIVE:                                                   Madelin Spain is a 19 year old female who presents to clinic today for the following health issue(s):  Patient presents with:  Vaginal Problem: Patient states she felt a lump in her vaginal area 3 days ago. Patient denies pain or drainage.     HPI:  Anthony is here today for what she thinks is a cyst. She admits she has been touching it a lot. Has a tiny amount of discharge or a white head on it today. Denies pain, itching, burning, bleeding, vaginal discharge, f/c, n/v.     Patient's last menstrual period was 10/08/2024..     Patient is sexually active, .  Using nothing for contraception.    reports that she has never smoked. She has been exposed to tobacco smoke. She has never used smokeless tobacco.  STD testing offered?  Declined    Health maintenance updated:  yes    Today's PHQ-2 Score:       2024    11:56 AM   PHQ-2 (  [employer])   Q1: Little interest or pleasure in doing things 2    Q2: Feeling down, depressed or hopeless 0    PHQ-2 Score 2   Q1: Little interest or pleasure in doing things More than half the days   Q2: Feeling down, depressed or hopeless Not at all   PHQ-2 Score 2       Patient-reported     Today's PHQ-9 Score:       10/24/2024     3:14 PM   PHQ-9 SCORE   PHQ-9 Total Score 2     Today's MELA-7 Score:       10/24/2024     3:14 PM   MELA-7 SCORE   Total Score 0       Problem list and histories reviewed & adjusted, as indicated.  Additional history: as documented.    Patient Active Problem List   Diagnosis    Menorrhagia with regular cycle    Dysmenorrhea    Hypercholesteremia    Nexplanon in place    Class 2 severe obesity due to excess calories with serious comorbidity in adult (H)     Past Surgical History:   Procedure Laterality Date    INSERT CONTRACEPTIVE IMPLANT - NEXPLANON/IMPLANON  2023    NO HISTORY OF SURGERY        Social History     Tobacco Use    Smoking status: Never     Passive exposure: Yes     Smokeless tobacco: Never    Tobacco comments:     dad   Substance Use Topics    Alcohol use: No      Problem (# of Occurrences) Relation (Name,Age of Onset)    Hypertension (2) Father, Maternal Grandmother    Family History Negative (1) Mother    Breast Cancer (1) Maternal Aunt    Coronary Artery Disease (1) Father              Current Outpatient Medications   Medication Sig Dispense Refill    docusate sodium (COLACE) 100 MG capsule Take 1 capsule (100 mg) by mouth daily (Patient not taking: Reported on 10/7/2024) 100 capsule 3    etonogestrel (NEXPLANON) 68 MG IMPL 1 each (68 mg) by Subdermal route once for 1 dose 1 each 0     No current facility-administered medications for this visit.     No Known Allergies      OBJECTIVE:     /80 (BP Location: Right arm)   Wt 99.6 kg (219 lb 9.6 oz)   LMP 10/08/2024   BMI 38.29 kg/m    Body mass index is 38.29 kg/m .    Exam:  Constitutional:  Appearance: Well nourished, well developed alert, in no acute distress  Psychiatric:  Mentation appears normal and affect normal/bright.  Pelvic Exam:  External Genitalia:     Normal appearance for age, no discharge present, no tenderness present, no inflammatory lesions present, color normal. Superior to labia majora right mons pubis has small irritated hair follicle with inflammation underneath for 1cm diameter   Vagina:     Normal vaginal vault without central or paravaginal defects, no discharge present, no inflammatory lesions present, no masses present  Bladder:     Nontender to palpation  Urethra:   Urethral Body:  Urethra palpation normal, urethra structural support normal   Urethral Meatus:  No erythema or lesions present    Perineum:     Perineum within normal limits, no evidence of trauma, no rashes or skin lesions present  Anus:     Anus within normal limits, no hemorrhoids present  Inguinal Lymph Nodes:     No lymphadenopathy present  Pubic Hair:     Normal pubic hair distribution for age  Genitalia and Groin:     No  rashes present, no lesions present, no areas of discoloration, no masses present     In-Clinic Test Results:  No results found for this or any previous visit (from the past 24 hours).    ASSESSMENT/PLAN:                                                        ICD-10-CM    1. Folliculitis  L73.9           There are no Patient Instructions on file for this visit.    -discussed not to pick, pop, irritate the lesion  -recommend keeping it clean and dry  -recommend epsom salt bathes   -recommend PRN bacitracin to area    Shannan Diamond PA-C  [facility] [place]

## 2024-11-09 ENCOUNTER — OFFICE VISIT (OUTPATIENT)
Dept: URGENT CARE | Facility: URGENT CARE | Age: 19
End: 2024-11-09
Payer: COMMERCIAL

## 2024-11-09 VITALS
WEIGHT: 219 LBS | HEART RATE: 110 BPM | RESPIRATION RATE: 16 BRPM | OXYGEN SATURATION: 99 % | DIASTOLIC BLOOD PRESSURE: 80 MMHG | TEMPERATURE: 101 F | BODY MASS INDEX: 38.19 KG/M2 | SYSTOLIC BLOOD PRESSURE: 122 MMHG

## 2024-11-09 DIAGNOSIS — J03.80 BACTERIAL TONSILLITIS: Primary | ICD-10-CM

## 2024-11-09 DIAGNOSIS — B96.89 BACTERIAL TONSILLITIS: Primary | ICD-10-CM

## 2024-11-09 LAB
DEPRECATED S PYO AG THROAT QL EIA: NEGATIVE
FLUAV AG SPEC QL IA: NEGATIVE
FLUBV AG SPEC QL IA: NEGATIVE
GROUP A STREP BY PCR: NOT DETECTED

## 2024-11-09 PROCEDURE — 87804 INFLUENZA ASSAY W/OPTIC: CPT | Performed by: PHYSICIAN ASSISTANT

## 2024-11-09 PROCEDURE — 87651 STREP A DNA AMP PROBE: CPT | Performed by: PHYSICIAN ASSISTANT

## 2024-11-09 PROCEDURE — 99213 OFFICE O/P EST LOW 20 MIN: CPT | Performed by: PHYSICIAN ASSISTANT

## 2024-11-09 RX ORDER — DEXAMETHASONE SODIUM PHOSPHATE 4 MG/ML
4 VIAL (ML) INJECTION ONCE
Status: COMPLETED | OUTPATIENT
Start: 2024-11-09 | End: 2024-11-09

## 2024-11-09 RX ORDER — AMOXICILLIN 400 MG/5ML
500 POWDER, FOR SUSPENSION ORAL 2 TIMES DAILY
Qty: 125 ML | Refills: 0 | Status: SHIPPED | OUTPATIENT
Start: 2024-11-09 | End: 2024-11-19

## 2024-11-09 RX ADMIN — Medication 10 MG: at 12:49

## 2024-11-09 NOTE — PATIENT INSTRUCTIONS
Patient was educated on the natural course of infection. Strep PCR is pending. Conservative measures discussed including warm fluids, salt water gargles, Lozenges (Cepacol), and over-the-counter analgesics (Tylenol or Ibuprofen). See your primary care provider if symptoms worsen or do not improve in 7 days. Seek emergency care if you develop severe throat pain, or difficulty swallowing.

## 2024-11-09 NOTE — PROGRESS NOTES
URGENT CARE VISIT:    SUBJECTIVE:   Madelin Spain is a 19 year old female presenting with a chief complaint of fever, sore throat, body aches, and difficulty swallowing.  Onset was 2 day(s) ago.   She denies the following symptoms: vomiting and diarrhea  Course of illness is same.    Treatment measures tried include Dayquil tried with no relief of symptoms.  Predisposing factors include None.    PMH:   Past Medical History:   Diagnosis Date    Menorrhagia      Allergies: Patient has no known allergies.   Medications:   Current Outpatient Medications   Medication Sig Dispense Refill    amoxicillin (AMOXIL) 400 MG/5ML suspension Take 6.25 mLs (500 mg) by mouth 2 times daily for 10 days. 125 mL 0    docusate sodium (COLACE) 100 MG capsule Take 1 capsule (100 mg) by mouth daily (Patient not taking: Reported on 11/9/2024) 100 capsule 3    etonogestrel (NEXPLANON) 68 MG IMPL 1 each (68 mg) by Subdermal route once for 1 dose 1 each 0     Social History:   Social History     Tobacco Use    Smoking status: Never     Passive exposure: Yes    Smokeless tobacco: Never    Tobacco comments:     dad   Substance Use Topics    Alcohol use: No       ROS:  General: fever  Skin: negative  Eyes: negative  Ears/Nose/Throat: st  Respiratory: negative  Cardiovascular: negative  Gastrointestinal: negative  Genitourinary: negative  Musculoskeletal: negative  Neurologic: negative      OBJECTIVE:  /80   Pulse 110   Temp (!) 101  F (38.3  C) (Tympanic)   Resp 16   Wt 99.3 kg (219 lb)   LMP 10/08/2024   SpO2 99%   BMI 38.19 kg/m    GENERAL APPEARANCE: healthy, alert and no distress  EYES: EOMI,  PERRL, conjunctiva clear  HENT: ear canals and TM's normal.  Tonsils 3+ with exudates  NECK: supple, nontender, right cervical lymphadenopathy  RESP: lungs clear to auscultation - no rales, rhonchi or wheezes  CV: regular rates and rhythm, normal S1 S2, no murmur noted  SKIN: no suspicious lesions or rashes    Labs:    Results for orders  placed or performed in visit on 11/09/24   Streptococcus A Rapid Screen w/Reflex to PCR     Status: Normal    Specimen: Throat; Swab   Result Value Ref Range    Group A Strep antigen Negative Negative   Influenza A/B antigen     Status: Normal    Specimen: Nose; Swab   Result Value Ref Range    Influenza A antigen Negative Negative    Influenza B antigen Negative Negative    Narrative    Test results must be correlated with clinical data. If necessary, results should be confirmed by a molecular assay or viral culture.       ASSESSMENT:    ICD-10-CM    1. Bacterial tonsillitis  J03.80 Streptococcus A Rapid Screen w/Reflex to PCR    B96.89 Influenza A/B antigen     Group A Streptococcus PCR Throat Swab     amoxicillin (AMOXIL) 400 MG/5ML suspension     dexAMETHasone (DECADRON) (HIGH CONC) solution 10 mg     dexAMETHasone (DECADRON) injectable solution used ORALLY 4 mg          PLAN:  Patient Instructions   Patient was educated on the natural course of infection. Strep PCR is pending. Take medications as prescribed. Side effects discussed. Conservative measures discussed including warm fluids, salt water gargles, Lozenges (Cepacol), and over-the-counter analgesics (Tylenol or Ibuprofen). See your primary care provider if symptoms worsen or do not improve in 7 days. Seek emergency care if you develop severe throat pain, or difficulty swallowing.     Patient verbalized understanding and is agreeable to plan. The patient was discharged ambulatory and in stable condition.    Codi Donaldson PA-C ....................  11/9/2024   12:49 PM

## 2024-12-06 PROBLEM — Z97.5 NEXPLANON IN PLACE: Status: RESOLVED | Noted: 2023-06-05 | Resolved: 2024-12-06

## 2024-12-11 ENCOUNTER — LAB (OUTPATIENT)
Dept: LAB | Facility: CLINIC | Age: 19
End: 2024-12-11
Payer: COMMERCIAL

## 2024-12-11 DIAGNOSIS — Z11.3 SCREEN FOR STD (SEXUALLY TRANSMITTED DISEASE): ICD-10-CM

## 2024-12-11 LAB — T PALLIDUM AB SER QL: NONREACTIVE

## 2024-12-11 PROCEDURE — 86780 TREPONEMA PALLIDUM: CPT

## 2024-12-11 PROCEDURE — 36415 COLL VENOUS BLD VENIPUNCTURE: CPT

## 2024-12-13 ENCOUNTER — TELEPHONE (OUTPATIENT)
Dept: PEDIATRICS | Facility: CLINIC | Age: 19
End: 2024-12-13

## 2024-12-13 NOTE — TELEPHONE ENCOUNTER
General Call      Reason for Call:  pregnancy    What are your questions or concerns:  patient is about 10 weeks pregnant. She is wondering if it is ok to do a sleep study when she is pregnant    Date of last appointment with provider: 12-6-24    Could we send this information to you in [employer] or would you prefer to receive a phone call?:   Patient would like to be contacted via [employer] or if you call her please leave a call back number. [phone] ok to leave detailed message

## 2024-12-14 ENCOUNTER — OFFICE VISIT (OUTPATIENT)
Dept: URGENT CARE | Facility: URGENT CARE | Age: 19
End: 2024-12-14
Payer: COMMERCIAL

## 2024-12-14 VITALS
SYSTOLIC BLOOD PRESSURE: 134 MMHG | HEART RATE: 118 BPM | DIASTOLIC BLOOD PRESSURE: 82 MMHG | OXYGEN SATURATION: 99 % | RESPIRATION RATE: 16 BRPM | TEMPERATURE: 98.1 F | BODY MASS INDEX: 39.23 KG/M2 | WEIGHT: 225 LBS

## 2024-12-14 DIAGNOSIS — R11.2 NAUSEA AND VOMITING, UNSPECIFIED VOMITING TYPE: Primary | ICD-10-CM

## 2024-12-14 DIAGNOSIS — Z3A.09 9 WEEKS GESTATION OF PREGNANCY: ICD-10-CM

## 2024-12-14 LAB — HCG UR QL: POSITIVE

## 2024-12-14 PROCEDURE — 99213 OFFICE O/P EST LOW 20 MIN: CPT | Performed by: NURSE PRACTITIONER

## 2024-12-14 PROCEDURE — 81025 URINE PREGNANCY TEST: CPT

## 2024-12-14 NOTE — PROGRESS NOTES
Chief Complaint   Patient presents with    Vomiting     Positive pregnancy test at home last week. Been vomiting since today. Have had 10x episodes of vomiting. Also complains of diarrhea.          ICD-10-CM    1. Vomiting  R11.10 HCG Qual, Urine ([id number])      2. 9 weeks gestation of pregnancy  Z3A.09       Instructed patient on the use of paroxetine and doxylamine.  Instructed her to start taking prenatal vitamins at bedtime with a small amount of food.  She already has OB appointment set up for 2 weeks from now and she will keep this.  We also discussed the use of jeff teas, small frequent meals and the importance of keeping hydrated    Red flag warning signs and when to go to the emergency room discussed.  Reviewed potential adverse reactions to medications.      Results for orders placed or performed in visit on 12/14/24 (from the past 24 hours)   HCG Qual, Urine ([id number])   Result Value Ref Range    hCG Urine Qualitative Positive (A) Negative       Subjective     Madelin ELIAS Grabiel is an 19 year old female who presents to clinic today for nausea and vomiting that started last week.  She is pregnant with her last menstrual period being on 10/8/2024      ROS: 10 point ROS neg other than the symptoms noted above in the HPI.       Objective    /82 (BP Location: Right arm, Patient Position: Sitting, Cuff Size: Adult Large)   Pulse 118   Temp 98.1  F (36.7  C) (Oral)   Resp 16   Wt 102.1 kg (225 lb)   LMP 10/08/2024   SpO2 99%   BMI 39.23 kg/m    Nurses notes and VS have been reviewed.    Physical Exam       GENERAL APPEARANCE: healthy appearing, alert     EYES: PERRL, EOMI, sclera non-icteric     HENT: oral exam benign, mucus membranes intact, without ulcers or lesions     NECK: no adenopathy or asymmetry, thyroid normal to palpation     RESP: lungs clear to auscultation - no rales, rhonchi or wheezes     CV: regular rates and rhythm, no murmurs, rubs, or gallop     ABDOMEN:  soft, nontender, no HSM or  masses and bowel sounds normal     MS: extremities normal- no gross deformities noted; normal muscle tone.     SKIN: no suspicious lesions or rashes      ADALGISA Argueta, CNP  [facility] Provider    The use of Dragon/[employer] dictation services may have been used to construct the content in this note; any grammatical or spelling errors are non-intentional. Please contact the author of this note directly if you are in need of any clarification.

## 2024-12-14 NOTE — PATIENT INSTRUCTIONS
Pyroxidine (Vitamin B6 25 mg every 8 hours as needed for nausea)    Doxylamine 25 mg every 8 hours as needed for nausea you may take these together.    Once your nausea is under control start your prenatal vitamins 1 daily.      Keep appointment with OB provider.

## 2024-12-16 ENCOUNTER — NURSE TRIAGE (OUTPATIENT)
Dept: NURSING | Facility: CLINIC | Age: 19
End: 2024-12-16
Payer: COMMERCIAL

## 2024-12-16 ENCOUNTER — TELEPHONE (OUTPATIENT)
Dept: PEDIATRICS | Facility: CLINIC | Age: 19
End: 2024-12-16
Payer: COMMERCIAL

## 2024-12-16 NOTE — TELEPHONE ENCOUNTER
Patient is nine weeks pregnant and wondering if she can take tylenol.  Patient is advised that she make take tylenol. Patient verbalized understanding of care advice.  Trinidad Bocanegra RN on 12/16/2024 at 6:25 AM    Reason for Disposition    Caller requesting information about medicine during pregnancy; adult is not ill AND triager answers question    Protocols used: Medication Question Call-A-

## 2024-12-16 NOTE — LETTER
December 17, 2024      Madelin Spain  [address]   [facility] [address]        To Whom It May Concern:    Madelin Spain  was seen on 12/6/24.  Please excuse her  on 12/15/24 due to illness.        Sincerely,        Kenny Loo, DO

## 2024-12-16 NOTE — TELEPHONE ENCOUNTER
Forms/Letter Request    Type of form/letter: Work    Have you been seen for this request: Yes Seen on 12/06/24    Do we have the form/letter: Yes: Note for excusing her from work today    When is form/letter needed by: ASAP    How would you like the form/letter returned:   or through [employer], whatever works    Patient Notified form requests are processed in 3-5 business days:Yes    Could we send this information to you in [employer] or would you prefer to receive a phone call?:   Patient would prefer a phone call   Okay to leave a detailed message?: Yes at Cell number on file:    Telephone Information:   Mobile [phone]

## 2024-12-20 NOTE — PATIENT INSTRUCTIONS
Thank you for coming to see the Midwives at the   [facility]!    Please take prenatal vitamin with DHA once daily during the entire pregnancy.    We will notify you about your labs that were drawn when we get the results back.  If you have MyChart your lab results will be posted there. Someone from the clinic will send you a [employer] message or call you personally with your results.    If you need any refills of medications please call your pharmacy, and they will contact us.    If you have a medical emergency please call 911.    If you have any concerns about today's visit, wish to schedule another appointment, or have an urgent medical concern please call our office at [phone]. You can also make appointments through [employer]    After hours you may also call the clinic number above to be connected with [facility]'s after hours triage nurse. The nurse can page the midwife on call, if needed. There is always a midwife on call 24 hours a day.    Prenatal Care Recommendations:    Before 14 weeks: Dating ultrasound, genetic testing       This ultrasound helps us determine your dates accurately. Noninvasive prenatal testing (genetic screening test) can be drawn anytime after 10 weeks of gestation; this test can also predict the baby's sex.    16 weeks: Optional genetic testing AFP       This testing helps understand your baby's risk for some genetic abnormalities.    19-21 weeks:  Screening anatomy ultrasound       This testing will look for early growth abnormalities, placenta location, and may tell the baby's sex if you wish to find out.    24-27 weeks: One hour diabetes test (GCT) and complete blood count       This test helps identify diabetes of pregnancy or gestational diabetes.  We also look at the iron in your blood and how well your blood clots.    28 weeks: Tetanus shot (Tdap)       This shot helps protect you and your baby from whooping cough.    32 weeks: Respiratory syncytial virus  shot (RSV) (seasonal)       This shot helps protect you and your baby from RSV.    36 weeks and later: Group B Strep test (GBS)       This test helps predict if you need antibiotics in labor to prevent your baby from getting an infection.    Anytime September to April:  Flu shot       This shot helps protect you and your family from the flu.  This is especially important during pregnancy.        The typical schedule after your first visit today you can expect:     Visit 2 - 12-16 weeks  Visit 3 - 20 weeks  Visit 4 - 24 weeks  Visit 5 - 28 weeks  Visit 6 - 30 weeks  Visit 7 - 32 weeks  Visit 8 - 34 weeks  Visit 9 - 36 weeks  Weekly after 36 weeks until delivery.        Any time during or after your pregnancy you may experience increased depression and/or mood changes.    We are here to support you. Please contact us if you are:  Feeling anxious  Overwhelmed or sad   Trouble sleeping  Crying uncontrollably  Trouble caring for yourself or baby.  Any thoughts of hurting yourself, your baby, or anyone else    If anything comes up between your visits or you have concerns please don't hesitate to contact us.    Secure access to your medical record:  Use [employer]files (secure email communication and access to your chart) to send your primary care provider a message or make an appointment. Ask someone in our office to sign up for [employer] Profiles. To log on to [employer] or for more information in [employer]files please visit the website at [email or web address].       Certified Nurse Midwife (CNM) Team    Enedelia DIANA, JOSE GUADALUPE DIANA, JOSE GUADALUPE Hansen APRHERMELINDA, JOSE GUADALUPE, [facility]-BC  Mary Carmen Morales DNP, APRN, JOSE GUADALUPE Avila DNP, APRN, JOSE GUADALUPE Joe DNP, APRN, JUAN ANTONIOM    Link to Midwifery Care website: [email or web address]      Again, thank you for choosing the midwives at [facility].  We are excited to be a part of your pregnancy! Please let us know how we can best  partner with you to improve your and your family's health.

## 2024-12-26 ENCOUNTER — VIRTUAL VISIT (OUTPATIENT)
Dept: OBGYN | Facility: CLINIC | Age: 19
End: 2024-12-26
Payer: COMMERCIAL

## 2024-12-26 DIAGNOSIS — O09.91 SUPERVISION OF HIGH RISK PREGNANCY IN FIRST TRIMESTER: Primary | ICD-10-CM

## 2024-12-26 DIAGNOSIS — O36.80X0 PREGNANCY WITH INCONCLUSIVE FETAL VIABILITY: ICD-10-CM

## 2024-12-26 DIAGNOSIS — Z13.79 GENETIC SCREENING: ICD-10-CM

## 2024-12-26 RX ORDER — VITAMIN A, VITAMIN C, VITAMIN D-3, VITAMIN E, VITAMIN B-1, VITAMIN B-2, NIACIN, VITAMIN B-6, CALCIUM, IRON, ZINC, COPPER 4000; 120; 400; 22; 1.84; 3; 20; 10; 1; 12; 200; 27; 25; 2 [IU]/1; MG/1; [IU]/1; MG/1; MG/1; MG/1; MG/1; MG/1; MG/1; UG/1; MG/1; MG/1; MG/1; MG/1
TABLET ORAL DAILY
COMMUNITY

## 2024-12-26 RX ORDER — DOCUSATE SODIUM 100 MG/1
100 CAPSULE, LIQUID FILLED ORAL DAILY
COMMUNITY

## 2024-12-26 NOTE — PATIENT INSTRUCTIONS
Learning About Pregnancy  Your Care Instructions     Your health in the early weeks of your pregnancy is particularly important for your baby's health. Take good care of yourself. Anything you do that harms your body can also harm your baby.  Make sure to go to all of your doctor appointments. Regular checkups will help keep you and your baby healthy.  How can you care for yourself at home?  Diet    Choose healthy foods like fruits, vegetables, whole grains, lean proteins, and healthy fats.     Choose foods that are good sources of calcium, iron, and folate. You can try dairy products, dark leafy greens, fortified orange juice and cereals, almonds, broccoli, dried fruit, and beans.     Do not skip meals or go for many hours without eating. If you are nauseated, try to eat a small, healthy snack every 2 to 3 hours.     Avoid fish that are high in mercury. These include shark, swordfish, angel mackerel, marlin, orange roughy, and bigeye tuna, as well as tilefish from the [facility].     It's okay to eat up to 8 to 12 ounces a week of fish that are low in mercury or up to 4 ounces a week of fish that have medium levels of mercury. Some fish that are low in mercury are salmon, shrimp, canned light tuna, cod, and tilapia. Some fish that have medium levels of mercury are halibut and white albacore tuna.     Drink plenty of fluids. If you have kidney, heart, or liver disease and have to limit fluids, talk with your doctor before you increase the amount of fluids you drink.     Limit caffeine to about 200 to 300 mg per day. On average, a cup of brewed coffee has around 80 to 100 mg of caffeine.     Do not drink alcohol, such as beer, wine, or hard liquor.     Take a multivitamin that contains at least 400 micrograms (mcg) of folic acid to help prevent birth defects. Fortified cereal and whole wheat bread are good additional sources of folic acid.     Increase the calcium in your diet. Try to drink a quart of skim milk  each day. You may also take calcium supplements and choose foods such as cheese and yogurt.   Lifestyle    Make sure you go to your follow-up appointments.     Get plenty of rest. You may be unusually tired while you are pregnant.     Get at least 30 minutes of exercise on most days of the week. Walking is a good choice. If you have not exercised in the past, start out slowly. Take several short walks each day.     Do not smoke. If you need help quitting, talk to your doctor about stop-smoking programs. These can increase your chances of quitting for good.     Do not touch cat feces or litter boxes. Also, wash your hands after you handle raw meat, and fully cook all meat before you eat it. Wear gloves when you work in the yard or garden, and wash your hands well when you are done. Cat feces, raw or undercooked meat, and contaminated dirt can cause an infection that may harm your baby or lead to a miscarriage.     Avoid things that can make your body too hot and may be harmful to your baby, such as a hot tub or sauna. Or talk with your doctor before doing anything that raises your body temperature. Your doctor can tell you if it's safe.     Avoid chemical fumes, paint fumes, or poisons.     Do not use illegal drugs, marijuana, or alcohol.   Medicines    Review all of your medicines with your doctor. Some of your routine medicines may need to be changed to protect your baby.     Use acetaminophen (Tylenol) to relieve minor problems, such as a mild headache or backache or a mild fever with cold symptoms. Do not use nonsteroidal anti-inflammatory drugs (NSAIDs), such as ibuprofen (Advil, Motrin) or naproxen (Aleve), unless your doctor says it is okay.     Do not take two or more pain medicines at the same time unless the doctor told you to. Many pain medicines have acetaminophen, which is Tylenol. Too much acetaminophen (Tylenol) can be harmful.     Take your medicines exactly as prescribed. Call your doctor if you  "think you are having a problem with your medicine.   To manage morning sickness    Keep food in your stomach, but not too much at once. Try eating five or six small meals a day instead of three large meals.     For nausea when you wake up, eat a small snack, such as a couple of crackers or pretzels, before rising. Allow a few minutes for your stomach to settle before you slowly get up.     Try to avoid smells and foods that make you feel nauseated. High-fat or greasy foods, milk, and coffee may make nausea worse. Some foods that may be easier to tolerate include cold, spicy, sour, and salty foods.     Drink enough fluids. Water and other caffeine-free drinks are good choices.     Take your prenatal vitamins at night on a full stomach.     Try foods and drinks made with melissa. Melissa may help with nausea.     Get lots of rest. Morning sickness may be worse when you are tired.     Talk to your doctor about over-the-counter products, such as vitamin B6 or doxylamine, to help relieve symptoms.     Try a P6 acupressure wrist band. These anti-nausea wristbands help some people.   Follow-up care is a key part of your treatment and safety. Be sure to make and go to all appointments, and call your doctor if you are having problems. It's also a good idea to know your test results and keep a list of the medicines you take.  Where can you learn more?  Go to https://www.[employer].net/patiented  Enter E868 in the search box to learn more about \"Learning About Pregnancy.\"  Current as of: April 30, 2024  Content Version: 14.3    2024 [employer].   Care instructions adapted under license by your healthcare professional. If you have questions about a medical condition or this instruction, always ask your healthcare professional. [employer] disclaims any warranty or liability for your use of this information.    Weeks 6 to 10 of Your Pregnancy: Care Instructions  During these weeks of pregnancy, your body " "goes through many changes. You may start to feel different, both in your body and your emotions. Each pregnancy is different, so there's no \"right\" way to feel. These early weeks are a time to make healthy choices for you and your pregnancy.    Take a daily prenatal vitamin. Choose one with folic acid in it.   Avoid alcohol, tobacco, and drugs (including marijuana). If you need help quitting, talk to your doctor.     Drink plenty of liquids.  Be sure to drink enough water. And limit sodas, other sweetened drinks, and caffeine.     Choose foods that are good sources of calcium, iron, and folate.  You can try dairy products, dark leafy greens, fortified orange juice and cereals, almonds, broccoli, dried fruit, and beans.     Avoid foods that may be harmful.  Don't eat raw meat, deli meat, raw seafood, or raw eggs. Avoid soft cheese and unpasteurized dairy, like Brie and blue cheese. And don't eat fish that contains a lot of mercury, like shark and swordfish.     Don't touch jamari litter or cat poop.  They can cause an infection that could be harmful during pregnancy.     Avoid things that can make your body too hot.  For example, avoid hot tubs and saunas.     Soothe morning sickness.  Try eating 5 or 6 small meals a day, getting some fresh air, or using jeff to control symptoms.     Ask your doctor about flu and COVID-19 shots.  Getting them can help protect against infection.   Follow-up care is a key part of your treatment and safety. Be sure to make and go to all appointments, and call your doctor if you are having problems. It's also a good idea to know your test results and keep a list of the medicines you take.  Where can you learn more?  Go to https://www.[employer].net/patiented  Enter G112 in the search box to learn more about \"Weeks 6 to 10 of Your Pregnancy: Care Instructions.\"  Current as of: April 30, 2024  Content Version: 14.3    2024 [facility].   Care instructions adapted under license " by your healthcare professional. If you have questions about a medical condition or this instruction, always ask your healthcare professional. [employer] disclaims any warranty or liability for your use of this information.       Pregnancy: Managing Morning Sickness (01:48)  Your health professional recommends that you watch this short online health video.  Learn how to manage morning sickness during pregnancy.   Purpose: Learn how to manage morning sickness during pregnancy.  Goal: Learn how to manage morning sickness during pregnancy.    Watch: Scan the QR code or visit the link to view video       [email or web address]  Current as of: April 30, 2024  Content Version: 14.3    2024 [employer].   Care instructions adapted under license by your healthcare professional. If you have questions about a medical condition or this instruction, always ask your healthcare professional. [employer] disclaims any warranty or liability for your use of this information.    Pregnancy and Heartburn: Care Instructions  Overview     Heartburn is a common problem during pregnancy.  Heartburn happens when stomach acid backs up into the tube that carries food to the stomach. This tube is called the esophagus. Early in pregnancy, heartburn is caused by hormone changes that slow down digestion. Later on, it's also caused by the large uterus pushing up on the stomach.  Even though you can't fix the cause, there are things you can do to get relief. Treating heartburn during pregnancy focuses first on making lifestyle changes, like changing what and how you eat, and on taking medicines.  Heartburn usually improves or goes away after childbirth.  Follow-up care is a key part of your treatment and safety. Be sure to make and go to all appointments, and call your doctor if you are having problems. It's also a good idea to know your test results and keep a list of the medicines you take.  How can you  "care for yourself at home?  Eat small, frequent meals.  Avoid foods that make your symptoms worse, such as chocolate, peppermint, and spicy foods. Avoid drinks with caffeine, such as coffee, tea, and sodas.  Avoid bending over or lying down after meals.  Take a short walk after you eat.  If heartburn is a problem at night, do not eat for 2 hours before bedtime.  Take antacids like Mylanta, Maalox, Rolaids, or Tums. Do not take antacids that have sodium bicarbonate, magnesium trisilicate, or aspirin. Be careful when you take over-the-counter antacid medicines. Many of these medicines have aspirin in them. While you are pregnant, do not take aspirin or medicines that contain aspirin unless your doctor says it is okay.  If you're not getting relief, talk to your doctor. You may be able to take a stronger acid-reducing medicine.  When should you call for help?   Call your doctor now or seek immediate medical care if:    You have new or worse belly pain.     You are vomiting.   Watch closely for changes in your health, and be sure to contact your doctor if:    You have new or worse symptoms of reflux.     You are losing weight.     You have trouble or pain swallowing.     You do not get better as expected.   Where can you learn more?  Go to https://www.[employer].net/patiented  Enter U946 in the search box to learn more about \"Pregnancy and Heartburn: Care Instructions.\"  Current as of: April 30, 2024  Content Version: 14.3    2024 [email or web address].   Care instructions adapted under license by your healthcare professional. If you have questions about a medical condition or this instruction, always ask your healthcare professional. [email or web address] disclaims any warranty or liability for your use of this information.    Constipation: Care Instructions  Overview     Constipation means that you have a hard time passing stools (bowel movements). People pass stools from 3 times a day to once every 3 days. What is " normal for you may be different. Constipation may occur with pain in the rectum and cramping. The pain may get worse when you try to pass stools. Sometimes there are small amounts of bright red blood on toilet paper or the surface of stools. This is because of enlarged veins near the rectum (hemorrhoids).  A few changes in your diet and lifestyle may help you avoid ongoing constipation. Your doctor may also prescribe medicine to help loosen your stool.  Some medicines can cause constipation. These include pain medicines and antidepressants. Tell your doctor about all the medicines you take. Your doctor may want to make a medicine change to ease your symptoms.  Follow-up care is a key part of your treatment and safety. Be sure to make and go to all appointments, and call your doctor if you are having problems. It's also a good idea to know your test results and keep a list of the medicines you take.  How can you care for yourself at home?  Drink plenty of fluids. If you have kidney, heart, or liver disease and have to limit fluids, talk with your doctor before you increase the amount of fluids you drink.  Include high-fiber foods in your diet each day. These include fruits, vegetables, beans, and whole grains.  Get at least 30 minutes of exercise on most days of the week. Walking is a good choice. You also may want to do other activities, such as running, swimming, cycling, or playing tennis or team sports.  Take a fiber supplement, such as Citrucel or Metamucil, every day. Read and follow all instructions on the label.  Schedule time each day for a bowel movement. A daily routine may help. Take your time having a bowel movement, but don't sit for more than 10 minutes at a time. And don't strain too much.  Support your feet with a small step stool when you sit on the toilet. This helps flex your hips and places your pelvis in a squatting position.  Your doctor may recommend an over-the-counter laxative to relieve  "your constipation. Examples are Milk of Magnesia and MiraLax. Read and follow all instructions on the label. Do not use laxatives on a long-term basis.  When should you call for help?   Call your doctor now or seek immediate medical care if:    You have new or worse belly pain.     You have new or worse nausea or vomiting.     You have blood in your stools.   Watch closely for changes in your health, and be sure to contact your doctor if:    Your constipation is getting worse.     You do not get better as expected.   Where can you learn more?  Go to https://www.[employer].net/patiented  Enter P343 in the search box to learn more about \"Constipation: Care Instructions.\"  Current as of: October 19, 2023  Content Version: 14.3    2024 [employer].   Care instructions adapted under license by your healthcare professional. If you have questions about a medical condition or this instruction, always ask your healthcare professional. [employer] disclaims any warranty or liability for your use of this information.    Learning About High-Iron Foods  What foods are high in iron?     The foods you eat contain nutrients, such as vitamins and minerals. Iron is a nutrient. Your body needs the right amount to stay healthy and work as it should. You can use the list below to help you make choices about which foods to eat.  Here are some foods that contain iron. They have 1 to 2 milligrams of iron per serving.  Fruits  Figs (dried), 5 figs  Vegetables  Asparagus (canned), 6 cee  Jenny, beet, Swiss chard, or turnip greens, 1 cup  Dried peas, cooked,   cup  Seaweed, spirulina (dried),   cup  Spinach, (cooked)   cup or (raw) 1 cup  Grains  Cereals, fortified with iron, 1 cup  Grits (instant, cooked), fortified with iron,   cup  Meats and other protein foods  Beans (kidney, lima, navy, white), canned or cooked,   cup  Beef or lamb, 3 oz  Chicken giblets, 3 oz  Chickpeas (garbanzo beans),   cup  Liver of beef, " "lamb, or pork, 3 oz  Oysters (cooked), 3 oz  Sardines (canned), 3 oz  Soybeans (boiled),   cup  Tofu (firm),   cup  Work with your doctor to find out how much of this nutrient you need. Depending on your health, you may need more or less of it in your diet.  Where can you learn more?  Go to https://www.[employer].net/patiented  Enter R005 in the search box to learn more about \"Learning About High-Iron Foods.\"  Current as of: September 20, 2023  Content Version: 14.3    2024 [employer].   Care instructions adapted under license by your healthcare professional. If you have questions about a medical condition or this instruction, always ask your healthcare professional. [employer] disclaims any warranty or liability for your use of this information.    Rh Antibodies Screening During Pregnancy: About This Test  What is it?     The Rh antibodies screening test is a blood test. It checks your blood for Rh antibodies. If you have Rh-negative blood and have been exposed to Rh-positive blood, your immune system may make antibodies to attack the Rh-positive blood. When a pregnant woman has these antibodies, it is called Rh sensitization.  Why is this test done?  The Rh antibodies screening test is done during pregnancy to find out if your baby is at risk for Rh disease. This can happen if you have Rh-negative blood and your baby has Rh-positive blood. If your Rh-negative blood mixes with Rh-positive blood, your immune system will make antibodies to attack the Rh-positive blood.  During pregnancy, these antibodies could attach to the baby's red blood cells. This can cause your baby to have serious health problems. The results of this test will help your doctor know how to best care for you and your baby during your pregnancy.  How do you prepare for the test?  In general, there's nothing you have to do before this test, unless your doctor tells you to.  How is the test done?  A health professional uses " "a needle to take a blood sample, usually from the arm.  What happens after the test?  You will probably be able to go home right away. It depends on the reason for the test.  You can go back to your usual activities right away.  Follow-up care is a key part of your treatment and safety. Be sure to make and go to all appointments, and call your doctor if you are having problems. It's also a good idea to keep a list of the medicines you take. Ask your doctor when you can expect to have your test results.  Where can you learn more?  Go to https://www.Mediastream.Xcerion/patiented  Enter P722 in the search box to learn more about \"Rh Antibodies Screening During Pregnancy: About This Test.\"  Current as of: 2024  Content Version: 14.3    2024 [employer].   Care instructions adapted under license by your healthcare professional. If you have questions about a medical condition or this instruction, always ask your healthcare professional. [employer] disclaims any warranty or liability for your use of this information.    Learning About Preventing Rh Disease  What is Rh disease?     Rh disease can be a serious problem in pregnancy. It happens when substances called antibodies in the mother's blood cause red blood cells in her baby's blood to be destroyed. This can occur when the blood types of a mother and her baby do not match.  All blood has an Rh factor. This is what makes a blood type positive or negative. When you are Rh-negative, your baby may be Rh-negative or Rh-positive. If your baby has Rh-positive blood and it mixes with yours, your body will make antibodies. This is called Rh sensitization.  Most of the time, this is not a problem in a first pregnancy. But in future pregnancies, it could cause Rh disease.  A  with Rh disease has mild anemia and may have jaundice. In severe cases, anemia, jaundice, and swelling can be very dangerous or fatal. Some babies need to be delivered " "early. Some need special care in the NICU. A very sick baby will need a blood transfusion before or after birth.  Fortunately, Rh sensitization is usually easy to prevent.  That's why it's important to get your Rh status checked in your first trimester. It doesn't cause any warning signs. A blood test is the only way to know if you are Rh-sensitive or are at risk for it.  How can you prevent Rh disease?  If you are Rh-negative, your doctor gives you an Rh immune globulin shot (such as RhoGAM). It helps prevent your body from making the antibodies that attack your baby's red blood cells.  Timing is important. You need the shot at certain times during your pregnancy. And you need one anytime there is a chance that your baby's blood might mix with yours. That can happen with certain prenatal tests or when you have pregnancy bleeding, such as:  Right after any pregnancy loss, amniocentesis, or CVS testing.  After turning of a breech baby.  Before and maybe after childbirth. Your doctor gives you a shot around week 28. If your  is Rh-positive, you will have another shot.  Follow-up care is a key part of your treatment and safety. Be sure to make and go to all appointments, and call your doctor if you are having problems. It's also a good idea to know your test results and keep a list of the medicines you take.  Where can you learn more?  Go to https://www.[employer].net/patiented  Enter W177 in the search box to learn more about \"Learning About Preventing Rh Disease.\"  Current as of: 2024  Content Version: 14.3    2024 [employer].   Care instructions adapted under license by your healthcare professional. If you have questions about a medical condition or this instruction, always ask your healthcare professional. [employer] disclaims any warranty or liability for your use of this information.    Learning About Rh Immunoglobulin Shots  Introduction     An Rh immunoglobulin shot is " given to pregnant women who have Rh-negative blood.  You may have Rh-negative blood, and your baby may have Rh-positive blood. If the two types of blood mix, your body will make antibodies. This is called Rh sensitization. Most of the time, this is not a problem the first time you're pregnant. But it could cause problems in future pregnancies.  This shot keeps your body from making the antibodies. You get the shot around 28 weeks of pregnancy. After the birth, your baby's blood is tested. If the blood is Rh positive, you will get another shot. You may also get the shot if you have vaginal bleeding while you are pregnant or if you have a miscarriage. These shots protect future pregnancies.  Women with Rh negative blood will need this shot each time they get pregnant.  Example  Rh immunoglobulin (HypRho-D, MICRhoGAM, and RhoGAM)  Possible side effects  Rare side effects may include:  Some mild pain where you got the shot.  A slight fever.  An allergic reaction.  You may have other side effects not listed here. Check the information that comes with your medicine.  What to know about taking this medicine  You may need more than one shot. You may need the shot again:  After amniocentesis, fetal blood sampling, or chorionic villus sampling tests.  If you have bleeding in your second or third trimester.  After turning of a breech baby.  After an injury to the belly while you are pregnant.  After a miscarriage or an .  Before or right after treatment for an ectopic or a partial molar pregnancy.  Tell your doctor if you have any allergies or have had a bad response to medicines in the past.  If you get this shot within 3 months of getting a live-virus vaccine, the vaccine may not work. Your doctor will tell you if you need more vaccine.  Check with your doctor or pharmacist before you use any other medicines. This includes over-the-counter medicines. Make sure your doctor knows all of the medicines, vitamins, herbs,  "and supplements you take. Taking some medicines at the same time can cause problems.  Where can you learn more?  Go to https://www.[employer].net/patiented  Enter V615 in the search box to learn more about \"Learning About Rh Immunoglobulin Shots.\"  Current as of: April 30, 2024  Content Version: 14.3    2024 [employer].   Care instructions adapted under license by your healthcare professional. If you have questions about a medical condition or this instruction, always ask your healthcare professional. [employer] disclaims any warranty or liability for your use of this information.    Rubella ([place] Measles): Care Instructions  Overview  Rubella, also called Congolese measles or 3-day measles, is a disease caused by a virus. It spreads by coughs, sneezes, and close contact. Rubella usually is mild and does not cause long-term problems. But if you are pregnant and get it, you can give the disease to your unborn baby. This can cause serious birth defects.  While you have rubella, you may get a rash and a mild fever, and the lymph glands in your neck may swell. Older children often have a fever, eye pain, a sore throat, and body aches. You can relieve most symptoms with care at home. Avoid being around others, especially pregnant people, until your rash has been gone for at least 4 days. People who have not had this disease before or have not had the vaccine have the greatest chance of getting the virus.  Follow-up care is a key part of your treatment and safety. Be sure to make and go to all appointments, and call your doctor if you are having problems. It's also a good idea to know your test results and keep a list of the medicines you take.  How can you care for yourself at home?  Drink plenty of fluids. If you have kidney, heart, or liver disease and have to limit fluids, talk with your doctor before you increase the amount of fluids you drink.  Get plenty of rest to help your body heal.  Take " "an over-the-counter pain medicine, such as acetaminophen (Tylenol), ibuprofen (Advil, Motrin), or naproxen (Aleve), to reduce fever and discomfort. Read and follow all instructions on the label. Do not give aspirin to anyone younger than 20. It has been linked to Reye syndrome, a serious illness.  Do not take two or more pain medicines at the same time unless the doctor told you to. Many pain medicines have acetaminophen, which is Tylenol. Too much acetaminophen (Tylenol) can be harmful.  Try not to scratch the rash. Put cold, wet cloths on the rash to reduce itching.  Do not smoke. Smoking can make your symptoms worse. If you need help quitting, talk to your doctor about stop-smoking programs and medicines. These can increase your chances of quitting for good.  Avoid contact with people who have never had rubella and who have not been immunized.  When should you call for help?   Call your doctor now or seek immediate medical care if:    You have a fever with a stiff neck or a severe headache.     You are sensitive to light or feel very sleepy or confused.   Watch closely for changes in your health, and be sure to contact your doctor if:    You do not get better as expected.   Where can you learn more?  Go to [email or web address]/patiented  Enter B812 in the search box to learn more about \"Rubella (Vietnamese Measles): Care Instructions.\"  Current as of: April 30, 2024  Content Version: 14.3    2024 [employer].   Care instructions adapted under license by your healthcare professional. If you have questions about a medical condition or this instruction, always ask your healthcare professional. [employer] disclaims any warranty or liability for your use of this information.    Gonorrhea and Chlamydia: About These Tests  What is it?  These tests use a sample of urine or other body fluid to look for the bacteria that cause these sexually transmitted infections (STIs). The fluid sample can come " "from the cervix, vagina, rectum, throat, or eyes.  Why is this test done?  These tests may be done to:  Find out if symptoms are caused by gonorrhea or chlamydia.  Check people who are at high risk of being infected with gonorrhea or chlamydia.  Retest people several months after they have been treated for gonorrhea or chlamydia.  Check for infection in your  if you had a gonorrhea or chlamydia infection at the time of delivery.  How can you prepare for the test?  If you are going to have a urine test, do not urinate for at least 1 hour before the test.  If you think you may have chlamydia or gonorrhea, don't have sexual intercourse until you get your test results. And you may want to have tests for other STIs, such as HIV.  How is the test done?  For a direct sample, a swab is used to collect body fluid from the cervix, vagina, rectum, throat, or eyes. Your doctor may collect the sample. Or you may be given instructions on how to collect your own sample.  For a urine sample, you will collect the urine that comes out when you first start to urinate. Don't wipe the genital area clean before you urinate.  How long does the test take?  The test will take a few minutes.  What happens after the test?  You will be able to go home right away.  You can go back to your usual activities right away.  If you do have an infection, don't have sexual intercourse for 7 days after you start treatment. And your sex partner(s) should also be treated.  Follow-up care is a key part of your treatment and safety. Be sure to make and go to all appointments, and call your doctor if you are having problems. It's also a good idea to keep a list of the medicines you take. Ask your doctor when you can expect to have your test results.  Where can you learn more?  Go to https://www.healthwise.net/patiented  Enter K976 in the search box to learn more about \"Gonorrhea and Chlamydia: About These Tests.\"  Current as of: 2024  Content " Version: 14.3    2024 [employer].   Care instructions adapted under license by your healthcare professional. If you have questions about a medical condition or this instruction, always ask your healthcare professional. [employer] disclaims any warranty or liability for your use of this information.    Trichomoniasis: About This Test  What is it?     This test uses a sample of urine or other body fluid to look for the tiny parasite that causes trichomoniasis (also called trich). The fluid sample can come from the vagina, cervix, or urethra. Your doctor may choose to use one or more of many available tests.  Why is it done?  A trich test may be done to:  Find out if symptoms are caused by trich.  Check people who are at high risk for being infected with trich.  Check after treatment to make sure that the infection is gone.  How do you prepare for the test?  If you are going to have a urine test, do not urinate for at least 1 hour before the test.  How is the test done?  For a direct sample, a swab is used to collect body fluid from the cervix, vagina, or urethra. Your doctor may collect the sample. Or you may be given instructions on how to collect your own sample.  For a urine sample, you will collect the urine that comes out when you first start to urinate. Don't wipe the area clean before you urinate.  How long does the test take?  It will take a few minutes to collect a sample.  What happens after the test?  You can go home right away.  You can go back to your usual activities right away.  You may get the test results the same day or several days later. It depends on the test used.  If you do have an infection, don't have sexual intercourse for 7 days after you start treatment. Your sex partner or partners should also be treated.  Follow-up care is a key part of your treatment and safety. Be sure to make and go to all appointments, and call your doctor if you are having problems. Ask your  "doctor when you can expect to have your test results.  Current as of: April 30, 2024  Content Version: 14.3    2024 [employer].   Care instructions adapted under license by your healthcare professional. If you have questions about a medical condition or this instruction, always ask your healthcare professional. [employer] disclaims any warranty or liability for your use of this information.    HIV Testing: Care Instructions  Overview  You can get tested for the human immunodeficiency virus (HIV). Most doctors use a blood test to check for HIV antibodies and antigens in your blood. It may also check for the genetic material (RNA) of HIV. Some tests use saliva to check for HIV antibodies. But these aren't as accurate. For example, they may give a false result if you've just been infected.  What do the results mean?        Normal (negative)   No HIV antibodies, antigens, or RNA were found.  You may need more testing. It can make sure your test results are correct.        Uncertain (indeterminate)   Test results didn't clearly show if you have an HIV infection.  HIV antibodies or antigens may not have formed yet.  Some other type of antibody or antigen may have affected the results.  You will need another test to be sure.        Abnormal (positive)   HIV antibodies, antigens, or RNA were found.  If you haven't had an RNA test yet, one will be done. If it's positive, you have HIV.  If your test result is positive, your doctor will talk to you. You will discuss starting treatment.  Follow-up care is a key part of your treatment and safety. Be sure to make and go to all appointments, and call your doctor if you are having problems. It's also a good idea to know your test results and keep a list of the medicines you take.  Where can you learn more?  Go to [email or web address]/patiented  Enter T792 in the search box to learn more about \"HIV Testing: Care Instructions.\"  Current as of: April 30, " 2024  Content Version: 14.3    2024 [employer].   Care instructions adapted under license by your healthcare professional. If you have questions about a medical condition or this instruction, always ask your healthcare professional. [employer] disclaims any warranty or liability for your use of this information.    Hepatitis C Virus Tests: About These Tests  What are they?     Hepatitis C virus tests are blood tests that check for substances in the blood that show whether you have hepatitis C now or had it in the past. The tests can also tell you what type of hepatitis C you have and how severe the disease is. This can help your doctor with treatment.  If the tests show that you have long-term hepatitis C, you need to take steps to prevent spreading the disease.  Why are these tests done?  You may need these tests if:  You have symptoms of hepatitis.  You may have been exposed to the virus. You are more likely to have been exposed to the virus if you inject drugs or are exposed to body fluids (such as if you are a health care worker).  You've had other tests that show you have liver problems.  You are 18 to 79 years old.  You have an HIV infection.  The tests also are done to help your doctor decide about your treatment and see how well it works.  How do you prepare for the test?  In general, there's nothing you have to do before this test, unless your doctor tells you to.  How is the test done?  A health professional uses a needle to take a blood sample, usually from the arm.  What happens after these tests?  You will probably be able to go home right away.  You can go back to your usual activities right away.  Follow-up care is a key part of your treatment and safety. Be sure to make and go to all appointments, and call your doctor if you are having problems. It's also a good idea to keep a list of the medicines you take. Ask your doctor when you can expect to have your test results.  Where  "can you learn more?  Go to https://www.[employer].net/patiented  Enter W551 in the search box to learn more about \"Hepatitis C Virus Tests: About These Tests.\"  Current as of: April 30, 2024  Content Version: 14.3    2024 [employer].   Care instructions adapted under license by your healthcare professional. If you have questions about a medical condition or this instruction, always ask your healthcare professional. [employer] disclaims any warranty or liability for your use of this information.    Learning About Fetal Ultrasound Results  What is a fetal ultrasound?     Fetal ultrasound is a test that lets your doctor see an image of your baby. Your doctor learns information about your baby from this picture. You may find out, for example, if you are having a boy or a girl. But the main reason you have this test is to get information about your baby's health.  (You may hear your baby called a fetus. This is a common medical term for a baby that's growing in the mother's uterus.)  What kind of information can you learn from this test?  The findings of an ultrasound fall into two categories, normal and abnormal.  Normal  The fetus is the right size for its age.  The placenta is the expected size and does not cover the cervix.  There is enough amniotic fluid in the uterus.  No birth defects can be seen.  Abnormal  The fetus is small or large for its age.  The placenta covers the cervix.  There is too much or too little amniotic fluid in the uterus.  The fetus may have a birth defect.  What does an abnormal result mean?  Abnormal seems to imply that something is wrong with your baby. But what it means is that the test has shown something the doctor wants to take a closer look at.  And that's what happens next. Your doctor will talk to you about what further test or tests you may need.  What do the results mean?  Some of the things your doctor may see on an abnormal ultrasound include:  Echogenic " bowel.  The bowel looks very bright on the screen. This could mean that there's blood in the bowel. Or it could mean that something is blocking the small bowel.  Increased nuchal translucency.  The ultrasound measures the thickness at the back of the baby's neck. An increase in thickness is sometimes an early sign of Down syndrome.  Increased or decreased amniotic fluid.  The doctor will look for a reason for the level of amniotic fluid and will watch the pregnancy closely as it progresses.  Large ventricles.  Ventricles in the brain look larger than they should. Your doctor may take a closer look at the brain.  Renal pyelectasis/hydronephrosis.  The ultrasound measures the fluid around the kidney. If there is more fluid than expected, there is a chance of urinary tract or kidney problems.  Short long bones.  The ultrasound measures certain arm and leg bones. A long bone (humerus or femur) that is shorter than average could be a sign of Down syndrome.  Subchorionic hemorrhage.  An ultrasound can show bleeding under one of the membranes that surrounds the fetus. Some women don't have symptoms of bleeding. The ultrasound can find this problem when women are not bleeding from their vagina. Women who have this condition have a slightly higher chance of miscarriage.  What do you do now?  Take a deep breath, and let it out. Keep in mind that an abnormal finding on an ultrasound, after it's coupled with more information, may:  Turn out to be nothing.  Turn out to be something mild that won't affect the baby.  Turn out to be something more serious. But if this happens, early diagnosis helps you and your doctor plan treatment options sooner rather than later.  Your medical team is there for you. So are your family and friends. Ask questions, and get the help and support you need.  Follow-up care is a key part of your treatment and safety. Be sure to make and go to all appointments, and call your doctor if you are having  "problems. It's also a good idea to know your test results and keep a list of the medicines you take.  Where can you learn more?  Go to [email or web address]  Enter K451 in the search box to learn more about \"Learning About Fetal Ultrasound Results.\"  Current as of: April 30, 2024  Content Version: 14.3    2024 [employer].   Care instructions adapted under license by your healthcare professional. If you have questions about a medical condition or this instruction, always ask your healthcare professional. [employer] disclaims any warranty or liability for your use of this information.    Learning About Prenatal Visits  Overview     Regular prenatal visits are very important during any pregnancy. These quick office visits may seem simple and routine. But they can help you have a safe and healthy pregnancy. Your doctor is watching for problems that can only be found through regular checkups. The visits also give you and your doctor time to build a good relationship.  After your first visit, you will most likely start on a schedule of monthly visits. In your third trimester, the visits will get more frequent. Based on your health, your age, and if you've had a normal, full-term pregnancy before, your doctor may want to see you more or less often.  At different times in your pregnancy, you will have exams and tests. Some are routine. Others are done only when there is a chance of a problem. Everything healthy you do for your body helps you have a healthy pregnancy. Rest when you need it. Eat well, drink plenty of water, and exercise regularly.  What happens during a prenatal visit?  You will have blood pressure checks, along with urine tests. You also may have blood tests. If you need to go to the bathroom while waiting for the doctor, tell the nurse. You will be given a sample cup so your urine can be tested.  You will be weighed and have your belly measured.  Your doctor may listen " to the fetal heartbeat with a special device.  At about 24 weeks, and possibly earlier in your pregnancy, your doctor will check your blood sugar (glucose tolerance test) for diabetes that can occur during pregnancy. This is gestational diabetes, which can be harmful.  You will have tests to check for infections that could harm your . These include group B streptococcus and hepatitis B.  Your doctor may do ultrasounds to check for problems. This also checks the position of the fetus. An ultrasound uses sound waves to produce a picture of the fetus.  You may get your vaccines updated.  Your doctor may ask you questions to check for signs of anxiety or depression. Tell your doctor if you feel sad, anxious, or hopeless for more than a few days.  You may have other tests at any time during your pregnancy.  Use your visits to discuss with your doctor any concerns you have.  How can you care for yourself at home?  Get plenty of rest.  Try to exercise every day, if your doctor says it is okay. If you have not exercised in the past, start out slowly. For example, you can take short walks each day.  Choose healthy foods, such as fruits, vegetables, whole grains, lean proteins, low-fat dairy, and healthy fats.  Drink plenty of fluids. Cut down on drinks with caffeine, such as coffee, tea, and cola. If you have kidney, heart, or liver disease and have to limit fluids, talk with your doctor before you increase the amount of fluids you drink.  Try to avoid chemical fumes, paint fumes, and poisons.  If you smoke, vape, or use alcohol, marijuana, or other drugs, quit or cut back as much as you can. Talk to your doctor if you need help quitting.  Review all of your medicines, including over-the-counter medicines and supplements, with your doctor. Some of your routine medicines may need to be changed. Do not stop or start taking any medicines without talking to your doctor first.  Follow-up care is a key part of your  "treatment and safety. Be sure to make and go to all appointments, and call your doctor if you are having problems. It's also a good idea to know your test results and keep a list of the medicines you take.  Where can you learn more?  Go to https://www.[email or web address]/patiented  Enter J502 in the search box to learn more about \"Learning About Prenatal Visits.\"  Current as of: April 30, 2024  Content Version: 14.3    2024 [employer].   Care instructions adapted under license by your healthcare professional. If you have questions about a medical condition or this instruction, always ask your healthcare professional. [employer] disclaims any warranty or liability for your use of this information.    Intimate Partner Violence: Care Instructions  Overview     If you want to save this information but don't think it is safe to take it home, see if a trusted friend can keep it for you. Plan ahead. Know who you can call for help, and memorize the phone number.   Be careful online too. Your online activity may be seen by others. Do not use your personal computer or device to read about this topic. Use a safe computer, such as one at work, a friend's home, or a library.    Intimate partner violence--a type of domestic abuse--is different from an argument now and then. It is a pattern of abuse that one person may use to control another person's behavior. It may start with threats and name-calling. Then, it may lead to more serious acts, like pushing and slapping. The abuse also may occur in other areas. For example, the abuser may withhold money or spend a partner's money without their knowledge.  Abuse can cause serious harm. You are more likely to have a long-term health problem from the injuries and stress of living in a violent relationship. People who are sexually abused by their partners have more sexually transmitted infections and unplanned pregnancies. Anyone who is abused also faces emotional " "pain. Anyone can be abused in relationships. In some relationships, both people use abusive behavior.  If you are pregnant, abuse can cause problems such as poor weight gain, infections, and bleeding. Abuse during this time may increase your baby's risk of low birth weight, premature birth, and death.  Follow-up care is a key part of your treatment and safety. Be sure to make and go to all appointments, and call your doctor if you are having problems. It's also a good idea to know your test results and keep a list of the medicines you take.  How can you care for yourself at home?  If you do not have a safe place to stay, discuss this with your doctor before you leave.  Have a plan for where to go, how to leave your home, and where to stay in case of an emergency. Do not tell your partner about your plan. Contact:  The National Domestic Violence Hotline toll-free at 1-898.419.8563. They can help you find resources in your area.  Your local police department, hospital, or clinic for information about shelters and safe homes near you.  Talk to a trusted friend or neighbor, a counselor, or a tera leader. Do not feel that you have to hide what happened.  Teach your children how to call for help in an emergency.  Be alert to warning signs, such as threats, heavy alcohol use, or drug use. This can help you avoid danger.  If you can, make sure that there are no guns or other weapons in your home.  When should you call for help?   Call 911 anytime you think you may need emergency care. For example, call if:    You or someone else has just been abused.     You think you or someone else is in danger of being abused.   Watch closely for changes in your health, and be sure to contact your doctor if you have any problems.  Where can you learn more?  Go to https://www.healthwise.net/patiented  Enter G282 in the search box to learn more about \"Intimate Partner Violence: Care Instructions.\"  Current as of: July 31, 2024  Content " Version: 14.3    2024 [employer].   Care instructions adapted under license by your healthcare professional. If you have questions about a medical condition or this instruction, always ask your healthcare professional. [employer] disclaims any warranty or liability for your use of this information.    Intimate Partner Violence Safety Instructions: Care Instructions  Overview     If you want to save this information but don't think it is safe to take it home, see if a trusted friend can keep it for you. Plan ahead. Know who you can call for help, and memorize the phone number.   Be careful online too. Your online activity may be seen by others. Do not use your personal computer or device to read about this topic. Use a safe computer, such as one at work, a friend's home, or a library.    When you are abused by a spouse or partner, you can take actions to protect yourself and your children.  You can increase your safety whether you decide to stay with your spouse or partner or you decide to leave. You may want to make a safety plan and pack a bag ahead of time. This will help you leave quickly when you decide to. Remember, you cannot change your partner's actions, but you can find help for you and your children. No one deserves to be abused.  Follow-up care is a key part of your treatment and safety. Be sure to make and go to all appointments, and call your doctor if you are having problems. It's also a good idea to know your test results and keep a list of the medicines you take.  How can you care for yourself at home?  Make a plan for your safety   If you decide to stay with your abusive spouse or partner, you can do the following to increase your safety:  Decide what works best to keep you safe in an emergency.  Know who you can call to help you in an emergency.  Decide if you will call the police if you get hurt again. If you can, agree on a signal with your children or neighbor to call the  police for you if you need help. You can flash lights or hang something out of a window.  Choose a safe place to go for a short time if you need to leave home. Memorize the address and phone number.  Learn escape routes out of your home in case you need to leave in a hurry. Teach your children different ways to get out of your home quickly if they need to.  If you can, hide or lock up things that can be used as weapons (guns, knives, hammers).  Learn the number of a domestic violence shelter. Talk to the people there about how they can help.  Find out about other community resources that can help you.  Take pictures of bruises or other injuries if you can. You can also take pictures of things your abuser has broken.  Teach your children that violence is never okay. Tell them that they do not deserve to be hurt.  Pack a bag   Prepare a bag with things you will need if you leave suddenly. Leave it with a friend, a relative, or someone else you trust. You could include the following items in the bag:  A set of keys to your home and car.  Emergency phone numbers and addresses.  Money such as cash or checks. You can also ask a friend, a relative, or someone else you trust to hold money for you.  Copies of legal documents such as house and car titles or rent receipts, birth certificates, Social Security card, voter registration, marriage and 's licenses, and your children's health records.  Personal items you would need for a few days, such as clothes, a toothbrush, toothpaste, and any medicines you or your children need.  A favorite toy or book for your child or children.  Diapers and bottles, if you have very young children.  Pictures that show signs of abuse and violence. You may also add pictures of your abuser.  If you leave   If you decide to leave, you can take the following steps:  Go to the emergency room at a hospital if you have been hurt.  Think about asking the police to be with you as you leave. They  "can protect you as you leave your home.  If you decide to leave secretly, remember that activities can be tracked. Your abuser may still have access to your cell phone, email, and credit cards. It may be possible for these to be traced. Always be aware of your surroundings.  If this is an emergency, do not worry about gathering up anything. Just leave--your safety is most important.  If your abuser moves out, change the locks on the doors. If you have a security system, change the access code.  When should you call for help?   Call 911 anytime you think you may need emergency care. For example, call if:    You or someone else has just been abused.     You think you or someone else is in danger of being abused.   Watch closely for changes in your health, and be sure to contact your doctor if you have any problems.  Where can you learn more?  Go to https://www.[employer].net/patiented  Enter A752 in the search box to learn more about \"Intimate Partner Violence Safety Instructions: Care Instructions.\"  Current as of: July 31, 2024  Content Version: 14.3    2024 [employer].   Care instructions adapted under license by your healthcare professional. If you have questions about a medical condition or this instruction, always ask your healthcare professional. [employer] disclaims any warranty or liability for your use of this information.    Learning About Intimate Partner Violence  What is intimate partner violence?  Intimate partner violence is a type of domestic abuse. It's threatening, emotionally harmful, or violent behavior in a personal relationship. It can happen between past or current partners or spouses. In some relationships both people abuse each other. One partner may be more abusive. Or the abuse may be equal.  Abuse can affect people of any ethnic group, race, or Christian. It can affect teens, adults, or the elderly. And it can happen to people of any sexual orientation, gender, or " social status.  Abusers use fear, bullying, and threats to control their partners. They may control what their partners do. They may control where their partners go or who they see. They may act jealous, controlling, or possessive. These early signs of abuse may happen soon after the start of the relationship. Sometimes it can be hard to notice abuse at first. But after the relationship becomes more serious, the abuse may get worse.  If you are being abused in your relationship, it's important to get help. The abuse is not your fault. You don't have to face it alone.  Be careful  It may not be safe to take home domestic abuse information like this handout. Some people ask a trusted friend to keep it for them. It's also important to plan ahead and to memorize the phone number of places you can go for help. If you are concerned about your safety, do not use your computer, smartphone, or tablet to read about domestic abuse.   What are the types of intimate partner violence?  Abuse can happen in different ways. Each type can happen on its own or in combination with others.  Emotional abuse  Emotional abuse is a pattern of threats, insults, or controlling behavior. It includes verbal abuse. It goes beyond healthy disagreements in a relationship. It's a sign of an unhealthy relationship.  Do you feel threatened, intimidated, or controlled?  Does your partner:  Threaten your children, other family members, or pets?  Use jokes meant to embarrass or shame you?  Call you names?  Tell you that you are a bad parent?  Threaten to take away your children?  Threaten to have you or your family members deported?  Control your access to money or other basic needs?  Control what you do, who you see or talk to, or where you go?  Another form of emotional abuse is denying that it is happening. Or the abuser may act like the abuse is no big deal or is your fault.  Sexual abuse  With sexual abuse, abusers may try to convince or force you  to have sex. They may force you into sex acts you're not comfortable with. Or they may sexually assault you. Sexual abuse can happen even if you are in a committed relationship.  Physical abuse  Physical abuse means that a partner hits, kicks, or does something else to physically hurt you. Physical abuse that starts with a slap might lead to kicking, shoving, and choking over time. The abuser may also threaten to hurt or kill you.  Stalking  Stalking means that an abuser gives you attention that you do not want and that causes you fear. Examples of stalking include:  Following you.  Showing up at places where the abuser isn't invited, such as at your work or school.  Constantly calling or texting you.  What problems can  to?  Intimate partner violence can be very dangerous. It can cause serious, repeated injury. It can even lead to death.  All forms of abuse can cause long-term health problems from the stress of a violent relationship. Verbal abuse can lead to sexual and physical abuse.  Abuse causes:  Emotional pain.  Depression.  Anxiety.  Post-traumatic stress.  Sexual abuse can lead to sexually transmitted infections (such as HIV/AIDS) and unplanned pregnancy.  Pregnancy can be a very dangerous time for people in abusive relationships. Abuse can cause or increase the risk of problems during pregnancy. These include low weight gain, anemia, infections, and bleeding. Abuse may also increase your baby's risk of low birth weight, premature birth, and death.  It can be hard for some victims of abuse to ask for help or to leave their relationship. You may feel scared, stuck, or not sure what steps to take. But it's important not to ignore abuse. Talking to someone you trust could be the first step to ending the abuse and taking care of your own health and happiness again. There are resources available that can help keep you safe.  Where can you get help?  Talk to a trusted friend. Find a local advocacy group,  "or talk to your doctor about the abuse.  Contact the National Domestic Violence Hotline at 3-658-215-SAFE (1-228.247.6301) for more safety tips. They can guide you to groups in your area that can help. Or go to the National Coalition Against Domestic Violence website at www.thehotline.org to learn more.  Domestic violence groups or a counselor in your area can help you make a safety plan for yourself and your children.  When to call for help  Call 911 anytime you think you may need emergency care. For example, call if:  You think that you or someone you know is in danger of being abused.  You have been hurt and can't have someone safely take you to emergency care.  You have just been abused.  A family member has just been abused.  Where can you learn more?  Go to https://www.[employer]VPN.ShareDesk/patiented  Enter S665 in the search box to learn more about \"Learning About Intimate Partner Violence.\"  Current as of: July 31, 2024  Content Version: 14.3    2024 [employer]   Care instructions adapted under license by your healthcare professional. If you have questions about a medical condition or this instruction, always ask your healthcare professional. [employer] disclaims any warranty or liability for your use of this information.    Vaginal Bleeding During Pregnancy: Care Instructions  Overview     It's common to have some vaginal spotting when you are pregnant. In some cases, the bleeding isn't serious. And there aren't any more problems with the pregnancy.  But sometimes bleeding is a sign of a more serious problem. This is more common if the bleeding is heavy or painful. Examples of more serious problems include miscarriage, an ectopic pregnancy, and a problem with the placenta.  You may have to see your doctor again to be sure everything is okay. You may also need more tests to find the cause of the bleeding.  Home treatment may be all you need. But it depends on what is causing the bleeding. " Be sure to tell your doctor if you have any new symptoms or if your symptoms get worse.  The doctor has checked you carefully, but problems can develop later. If you notice any problems or new symptoms, get medical treatment right away.  Follow-up care is a key part of your treatment and safety. Be sure to make and go to all appointments, and call your doctor if you are having problems. It's also a good idea to know your test results and keep a list of the medicines you take.  How can you care for yourself at home?  If your doctor prescribed medicines, take them exactly as directed. Call your doctor if you think you are having a problem with your medicine.  Do not have vaginal sex until your doctor says it's okay.  Do not put anything in your vagina until your doctor says it's okay.  Ask your doctor about other activities you can or can't do.  Get a lot of rest. Being pregnant can make you tired.  Do not use nonsteroidal anti-inflammatory drugs (NSAIDs), such as ibuprofen (Advil, Motrin), naproxen (Aleve), or aspirin, unless your doctor says it is okay.  When should you call for help?   Call 911 anytime you think you may need emergency care. For example, call if:    You passed out (lost consciousness).     You have severe vaginal bleeding. This means you are soaking through a pad each hour for 2 or more hours.     You have sudden, severe pain in your belly or pelvis.   Call your doctor now or seek immediate medical care if:    You have new or worse vaginal bleeding.     You are dizzy or lightheaded, or you feel like you may faint.     You have pain in your belly, pelvis, or lower back.     You think that you are in labor.     You have a sudden release of fluid from your vagina.     You've been having regular contractions for an hour. This means that you've had at least 8 contractions within 1 hour or at least 4 contractions within 20 minutes, even after you change your position and drink fluids.     You notice that  your baby has stopped moving or is moving much less than normal.   Watch closely for changes in your health, and be sure to contact your doctor if you have any problems.  Current as of: April 30, 2024  Content Version: 14.3    2024 [email or web address].   Care instructions adapted under license by your healthcare professional. If you have questions about a medical condition or this instruction, always ask your healthcare professional. [email or web address] disclaims any warranty or liability for your use of this information.

## 2024-12-26 NOTE — PROGRESS NOTES
"  SUBJECTIVE:     HPI:    This is a 19 year old female patient,  who presents for her first obstetrical visit.    CAROL: 7/15/2025, by Last Menstrual Period.  She is 11w2d weeks.  Her cycles are irregular.  Her last menstrual period was normal.   Since her LMP, she has experienced  nausea, emesis, fatigue, and constipation).   She denies abdominal pain, headache, loss of appetite, vaginal discharge, dysuria, pelvic pain, urinary urgency, lightheadedness, urinary frequency, vaginal bleeding, and hemorrhoids.    Additional History:   - 1st pregnancy   - Pt had Nexplanon removed in early October due to weight gain and abnormal bleeding. States she had a bleed like a period for 5 days after removal. Her periods previously while on Nexplanon were irregular - pt reports she would bleed \"like a period\" and not just spotting for a full month, then have no bleed for a few months a bleed again.   - High cholesterol, sees Dr. Alem Nagel in [facility]. No meds.   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 Pt reports her nausea is improved and she has not vomited since visit to UC  - Does endorse some constipation, she is occasionally taking colace. Reviewed tips for managing constipation in pregnancy     Have you travelled during the pregnancy?No  Have your sexual partner(s) travelled during the pregnancy?No    HISTORY:   Planned Pregnancy: No - surprise but excited   Marital Status: Single  Occupation: Currently taking a leave at work. Was working as CNA/home health aid for a few clients. Working part time as an  at a shoe store, but currently looking for other work   Living in Household: Significant Other    Past History:  Her past medical history   Past Medical History:   Diagnosis Date    Menorrhagia    .      She has a history of   1st pregnancy     Since her " "last LMP she denies use of alcohol, tobacco and street drugs.    Past medical, surgical, social and family history were reviewed and updated in [facility].    Confirmed patient desires delivery at [facility] with the [facility].    Nurse phone visit completed. Prenatal book and folder (containing standard educational hand-outs and brochures) will be given at the next visit to patient. Information in folder reviewed over the phone. Questions answered. Brochure given on optional screening available to assess chromosomal anomalies. Pt desires NIPS/ordered. Pt advised to call the clinic if she has any questions or concerns related to her pregnancy. Prenatal labs future ordered. New prenatal visit scheduled on 12/30 with Meme Joe.    Erica Pagan RN on 12/26/2024 at 4:15 PM  [facility] Triage     No results found for: \"PAP\"    PHQ-9 score:        10/24/2024     3:14 PM   PHQ   PHQ-9 Total Score 2   Q9: Thoughts of better off dead/self-harm past 2 weeks Not at all           10/24/2024     3:14 PM   MELA-7 SCORE   Total Score 0       Patient supplied answers from flow sheet for:  Prenatal OB Questionnaire.  Past Medical History  Have you ever recieved care for your mental health? : No  Have you ever been in a major accident or suffered serious trauma?: No  Within the last year, has anyone hit, slapped, kicked or otherwise hurt you?: No  In the last year, has anyone forced you to have sex when you didn't want to?: No    Past Medical History 2   Have you ever received a blood transfusion?: No  Would you accept a blood transfusion if was medically recommended?: Yes  Does anyone in your home smoke?: No   Is your blood type Rh negative?: Unknown  Have you ever ?: No  Have you been hospitalized for a nonsurgical reason excluding normal delivery?: No  Have you ever had an abnormal pap smear?: No    Past Medical History (Continued)  Do you have a history of abnormalities of the " uterus?: No  Did your mother take VIVI or any other hormones when she was pregnant with you?: Unknown  Do you have any other problems we have not asked about which you feel may be important to this pregnancy?: No         Current Outpatient Medications   Medication Sig Dispense Refill    docusate sodium (COLACE) 100 MG capsule Take 100 mg by mouth daily.      Prenatal Vit-Fe Fumarate-FA (PRENATAL MULTIVITAMIN  PLUS IRON) 27-1 MG TABS Take by mouth daily.       No current facility-administered medications for this visit.       ROS:   12 point review of systems negative other than symptoms noted below or in the HPI.  Constitutional: Fatigue  Gastrointestinal: Constipation, Nausea, and Vomiting

## 2024-12-29 LAB
ABO + RH BLD: NORMAL
BLD GP AB SCN SERPL QL: NEGATIVE
SPECIMEN EXP DATE BLD: NORMAL

## 2024-12-30 ENCOUNTER — ANCILLARY PROCEDURE (OUTPATIENT)
Dept: ULTRASOUND IMAGING | Facility: CLINIC | Age: 19
End: 2024-12-30
Payer: COMMERCIAL

## 2024-12-30 ENCOUNTER — PRENATAL OFFICE VISIT (OUTPATIENT)
Dept: MIDWIFE SERVICES | Facility: CLINIC | Age: 19
End: 2024-12-30
Payer: COMMERCIAL

## 2024-12-30 VITALS
SYSTOLIC BLOOD PRESSURE: 110 MMHG | BODY MASS INDEX: 37.97 KG/M2 | HEIGHT: 64 IN | WEIGHT: 222.4 LBS | DIASTOLIC BLOOD PRESSURE: 60 MMHG

## 2024-12-30 DIAGNOSIS — O09.91 SUPERVISION OF HIGH RISK PREGNANCY IN FIRST TRIMESTER: ICD-10-CM

## 2024-12-30 DIAGNOSIS — O36.80X0 PREGNANCY WITH INCONCLUSIVE FETAL VIABILITY: ICD-10-CM

## 2024-12-30 DIAGNOSIS — O09.91 SUPERVISION OF HIGH RISK PREGNANCY IN FIRST TRIMESTER: Primary | ICD-10-CM

## 2024-12-30 DIAGNOSIS — E66.01 CLASS 2 SEVERE OBESITY DUE TO EXCESS CALORIES WITH SERIOUS COMORBIDITY IN ADULT, UNSPECIFIED BMI (H): ICD-10-CM

## 2024-12-30 DIAGNOSIS — E66.812 CLASS 2 SEVERE OBESITY DUE TO EXCESS CALORIES WITH SERIOUS COMORBIDITY IN ADULT, UNSPECIFIED BMI (H): ICD-10-CM

## 2024-12-30 LAB
ALBUMIN UR-MCNC: NEGATIVE MG/DL
APPEARANCE UR: CLEAR
BACTERIA #/AREA URNS HPF: ABNORMAL /HPF
BILIRUB UR QL STRIP: NEGATIVE
COLOR UR AUTO: YELLOW
ERYTHROCYTE [DISTWIDTH] IN BLOOD BY AUTOMATED COUNT: 11.8 % (ref 10–15)
EST. AVERAGE GLUCOSE BLD GHB EST-MCNC: 91 MG/DL
GLUCOSE UR STRIP-MCNC: NEGATIVE MG/DL
HBA1C MFR BLD: 4.8 % (ref 0–5.6)
HBV SURFACE AG SERPL QL IA: NONREACTIVE
HCT VFR BLD AUTO: 39.6 % (ref 35–47)
HCV AB SERPL QL IA: NONREACTIVE
HGB BLD-MCNC: 13.6 G/DL (ref 11.7–15.7)
HGB UR QL STRIP: ABNORMAL
KETONES UR STRIP-MCNC: NEGATIVE MG/DL
LEUKOCYTE ESTERASE UR QL STRIP: NEGATIVE
MCH RBC QN AUTO: 30.5 PG (ref 26.5–33)
MCHC RBC AUTO-ENTMCNC: 34.3 G/DL (ref 31.5–36.5)
MCV RBC AUTO: 89 FL (ref 78–100)
NITRATE UR QL: NEGATIVE
PH UR STRIP: 7 [PH] (ref 5–7)
PLATELET # BLD AUTO: 310 10E3/UL (ref 150–450)
RBC # BLD AUTO: 4.46 10E6/UL (ref 3.8–5.2)
RBC #/AREA URNS AUTO: ABNORMAL /HPF
SP GR UR STRIP: 1.01 (ref 1–1.03)
SQUAMOUS #/AREA URNS AUTO: ABNORMAL /LPF
UROBILINOGEN UR STRIP-ACNC: 0.2 E.U./DL
WBC # BLD AUTO: 13.3 10E3/UL (ref 4–11)
WBC #/AREA URNS AUTO: ABNORMAL /HPF

## 2024-12-30 PROCEDURE — 86762 RUBELLA ANTIBODY: CPT

## 2024-12-30 PROCEDURE — 81001 URINALYSIS AUTO W/SCOPE: CPT

## 2024-12-30 PROCEDURE — 85027 COMPLETE CBC AUTOMATED: CPT

## 2024-12-30 PROCEDURE — 86850 RBC ANTIBODY SCREEN: CPT

## 2024-12-30 PROCEDURE — 86901 BLOOD TYPING SEROLOGIC RH(D): CPT

## 2024-12-30 PROCEDURE — 83036 HEMOGLOBIN GLYCOSYLATED A1C: CPT

## 2024-12-30 PROCEDURE — 87340 HEPATITIS B SURFACE AG IA: CPT

## 2024-12-30 PROCEDURE — 87086 URINE CULTURE/COLONY COUNT: CPT

## 2024-12-30 PROCEDURE — 86900 BLOOD TYPING SEROLOGIC ABO: CPT

## 2024-12-30 PROCEDURE — 86803 HEPATITIS C AB TEST: CPT

## 2024-12-30 PROCEDURE — 99215 OFFICE O/P EST HI 40 MIN: CPT

## 2024-12-30 PROCEDURE — 36415 COLL VENOUS BLD VENIPUNCTURE: CPT

## 2024-12-30 PROCEDURE — 86787 VARICELLA-ZOSTER ANTIBODY: CPT

## 2024-12-30 PROCEDURE — G2211 COMPLEX E/M VISIT ADD ON: HCPCS

## 2024-12-30 PROCEDURE — 76801 OB US < 14 WKS SINGLE FETUS: CPT | Performed by: OBSTETRICS & GYNECOLOGY

## 2024-12-30 RX ORDER — ASPIRIN 81 MG/1
81 TABLET ORAL DAILY
Qty: 60 TABLET | Refills: 3 | Status: SHIPPED | OUTPATIENT
Start: 2024-12-30

## 2024-12-30 ASSESSMENT — ANXIETY QUESTIONNAIRES
2. NOT BEING ABLE TO STOP OR CONTROL WORRYING: NOT AT ALL
GAD7 TOTAL SCORE: 0
3. WORRYING TOO MUCH ABOUT DIFFERENT THINGS: NOT AT ALL
IF YOU CHECKED OFF ANY PROBLEMS ON THIS QUESTIONNAIRE, HOW DIFFICULT HAVE THESE PROBLEMS MADE IT FOR YOU TO DO YOUR WORK, TAKE CARE OF THINGS AT HOME, OR GET ALONG WITH OTHER PEOPLE: NOT DIFFICULT AT ALL
5. BEING SO RESTLESS THAT IT IS HARD TO SIT STILL: NOT AT ALL
1. FEELING NERVOUS, ANXIOUS, OR ON EDGE: NOT AT ALL
7. FEELING AFRAID AS IF SOMETHING AWFUL MIGHT HAPPEN: NOT AT ALL
6. BECOMING EASILY ANNOYED OR IRRITABLE: NOT AT ALL
GAD7 TOTAL SCORE: 0

## 2024-12-30 ASSESSMENT — PATIENT HEALTH QUESTIONNAIRE - PHQ9
SUM OF ALL RESPONSES TO PHQ QUESTIONS 1-9: 3
5. POOR APPETITE OR OVEREATING: NOT AT ALL

## 2024-12-30 NOTE — PROGRESS NOTES
"   SUBJECTIVE:      HPI:     This is a 19 year old female patient,  who presents for her first obstetrical visit.     CAROL: 7/15/2025, by Last Menstrual Period.  She is 11w2d weeks.  Her cycles are irregular.  Her last menstrual period was normal.   Since her LMP, she has experienced  nausea, emesis, fatigue, and constipation).   She denies abdominal pain, headache, loss of appetite, vaginal discharge, dysuria, pelvic pain, urinary urgency, lightheadedness, urinary frequency, vaginal bleeding, and hemorrhoids.     Additional History:   - 1st pregnancy   - Pt had Nexplanon removed in early October due to weight gain and abnormal bleeding. States she had a bleed like a period for 5 days after removal. Her periods previously while on Nexplanon were irregular - pt reports she would bleed \"like a period\" and not just spotting for a full month, then have no bleed for a few months a bleed again.   - High cholesterol, sees Dr. Alem Nagel in [facility]. No meds.   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 Pt reports her nausea is improved and she has not vomited since visit to UC  - Does endorse some constipation, she is occasionally taking colace. Reviewed tips for managing constipation in pregnancy      Have you travelled during the pregnancy?No  Have your sexual partner(s) travelled during the pregnancy?No     HISTORY:   Planned Pregnancy: No - surprise but excited   Marital Status: Single  Occupation: Currently taking a leave at work. 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     OB HISTORY  OB History    Para Term  AB Living   1 0 0 0 0 0   SAB IAB Ectopic Multiple Live Births   0 0 0 0 0      # Outcome Date GA Lbr Fernandez/2nd Weight Sex Type Anes PTL Lv   1 Current                PERSONAL HISTORY  Exercise Habits:  none   Employment: Part time at a shoe store.  Her job involves light activity with no potential for toxic exposure.    Travel plans:  are intra [place] air travel.   Diet: Irregular diet due to pregnancy symptoms, eating lots of fruit and saltines. No other dietary restrictions.  Abuse concerns? No  Hgb A1c screen:  BMI > 30: Yes, 1st degree family DM: No, History of GDM: No, PCOS: No, High risk ethnicity: Yes    OBJECTIVE:     EXAM:  /60   Ht 1.63 m (5' 4.17\")   Wt 100.9 kg (222 lb 6.4 oz)   LMP 10/08/2024   BMI 37.97 kg/m   Body mass index is 37.97 kg/m .    GENERAL: alert and no distress  RESP: unlabored breathing  CV: well perfused  \MS: no gross musculoskeletal defects noted, no edema  SKIN: no suspicious lesions or rashes to visualized skin  NEURO: Normal strength and tone, mentation intact and speech normal  PSYCH: mentation appears normal, affect normal/bright    ASSESSMENT/PLAN:       ICD-10-CM    1. Supervision of high risk pregnancy in first trimester  O09.91 Urine Culture Aerobic Bacterial     *UA reflex to Microscopic     Urine Microscopic Exam     aspirin 81 MG EC tablet     Varicella Zoster Virus Antibody IgG     ABO/Rh type and screen     Hepatitis B surface antigen     CBC with platelets     Rubella Antibody IgG     Hemoglobin A1c     Hepatitis C antibody     Varicella Zoster Virus Antibody IgG     [facility] Referral - Pregnancy      2. BMI 37.0-37.9, adult  Z68.37 [facility] Referral - Pregnancy      3. Class 2 severe obesity due to excess calories with serious comorbidity in adult, unspecified BMI (H)  E66.812 Mat " Fetal Med Ctr Referral - Pregnancy    E66.01           19 year old , On 2024 patient is 10w3d weeks of pregnancy with CAROL of 2025, by dating ultrasound    Discussed as follows:  - Dating: per early ultrasound due to >7 day difference from LMP. EDC is 25. Preliminary ultrasound results reviewed in clinic today.  - Genetic screening: NIPS and carrier screening reviewed, pt is unsure if desired. Will review at home with partner and call insurance re coverage. Would not like it to be drawn today.  - Mood is stable.   - ASA: indicated due to BMI and nulliparity. Pt agreeable to start LDA at 12 weeks GA. Rx sent per pt preference.  - MFM  referral is indicated due to BMI >35, ordered. Pt agreeable.  - Pap up to date  - Vaccines: covid/flu vaccines are up to date    COUNSELING  Instructed on use of triage nurse line and contacting the on call CNM after hours in an emergency.   Symptoms of N&V and fatigue usually start to resolve around 12-16 weeks   Reviewed CNM philosophy, call schedule for labor and delivery, and FSH for delivery  1st OB handout given outlining appointment spacing and CNM information  Reviewed exercise and nutrition  Recommend to gain 11-20 pounds with her pregnancy.  Discussed OTC medications. OB med list given  Encouraged patient to take PNV's/DHA  Travel precautions discussed, no air travel after 36 weeks and COVID recommendations discussed  Will notify patient with lab results when available  81mg aspirin 1 x day at bedtime to be started at 12-16 weeks, if criteria met    F/U to be addressed next visit:  ASA start at 12 weeks, MFM referral, genetic screening decision    Will return to the clinic in 4 weeks for her next routine prenatal check.  Will call to be seen sooner if problems arise.    40 minutes spent by me on the date of the encounter doing chart review, history and exam, documentation and further activities per the note    ADALGISA Michael CNTING

## 2024-12-31 ENCOUNTER — TRANSCRIBE ORDERS (OUTPATIENT)
Dept: MATERNAL FETAL MEDICINE | Facility: CLINIC | Age: 19
End: 2024-12-31
Payer: COMMERCIAL

## 2024-12-31 DIAGNOSIS — O26.90 PREGNANCY RELATED CONDITION, ANTEPARTUM: Primary | ICD-10-CM

## 2024-12-31 LAB
BACTERIA UR CULT: NORMAL
RUBV IGG SERPL QL IA: 1.43 INDEX
RUBV IGG SERPL QL IA: POSITIVE
VZV IGG SER QL IA: 4.52 S/CO
VZV IGG SER QL IA: POSITIVE

## 2025-01-14 ENCOUNTER — TELEPHONE (OUTPATIENT)
Dept: MIDWIFE SERVICES | Facility: CLINIC | Age: 20
End: 2025-01-14
Payer: COMMERCIAL

## 2025-01-14 NOTE — TELEPHONE ENCOUNTER
12w4d    Almost out of PNV and asking if she can switch to a gummy option or does she have to stay on the same throughout the pregnancy.  Reassured she can switch different brands as tolerated or preferred.  Ensure there is some iron supplement, folic acid and DHA.    Pt verbalized understanding, in agreement with plan, and voiced no further questions.    Selene Kidd RN on 1/14/2025 at 2:58 PM

## 2025-01-26 NOTE — PROGRESS NOTES
14w4d  Feels well overall. Here alone today. Leaving job where she has insurance, still unsure about genetic screening, will consider after US. Wondering about lack of weight gain. Wondering about flu and covid during pregnancy because she would for sure get if not pregnant  Fetal movement: not present  Denies loss of fluid/vb/contractions/pelvic pain  AFP: will wait until after US and decide  GC/Chlamydia urine collection today-done 12/12  Anatomy ultrasound next visit between 19-21 weeks, scheduled 2/25 @ [facility]  Discussed healthy eating and exercise during pregnancy can cause weight loss if a big change from previous habits, we do not recommend actively attempting to lose weight in pregnancy  Unable to get FHT by doppler, BSUS showed +Fetal movement/heart rate  New RX sent for ASA 90 days to  at a time  Accepting of flu today after discussion and recommendation to stay up to date on vaccines.  Return to clinic 4 weeks    ADALGISA Horn, CNM

## 2025-01-28 ENCOUNTER — PRENATAL OFFICE VISIT (OUTPATIENT)
Dept: MIDWIFE SERVICES | Facility: CLINIC | Age: 20
End: 2025-01-28
Payer: COMMERCIAL

## 2025-01-28 VITALS — WEIGHT: 219 LBS | BODY MASS INDEX: 37.39 KG/M2 | DIASTOLIC BLOOD PRESSURE: 68 MMHG | SYSTOLIC BLOOD PRESSURE: 116 MMHG

## 2025-01-28 DIAGNOSIS — O09.91 SUPERVISION OF HIGH RISK PREGNANCY IN FIRST TRIMESTER: Primary | ICD-10-CM

## 2025-01-28 DIAGNOSIS — Z3A.14 14 WEEKS GESTATION OF PREGNANCY: ICD-10-CM

## 2025-01-28 DIAGNOSIS — O99.212 OBESITY AFFECTING PREGNANCY IN SECOND TRIMESTER, UNSPECIFIED OBESITY TYPE: ICD-10-CM

## 2025-01-28 DIAGNOSIS — Z23 NEED FOR PROPHYLACTIC VACCINATION AND INOCULATION AGAINST INFLUENZA: ICD-10-CM

## 2025-01-28 PROCEDURE — 99207 PR PRENATAL VISIT: CPT | Performed by: ADVANCED PRACTICE MIDWIFE

## 2025-01-28 PROCEDURE — 90471 IMMUNIZATION ADMIN: CPT | Performed by: ADVANCED PRACTICE MIDWIFE

## 2025-01-28 PROCEDURE — 90656 IIV3 VACC NO PRSV 0.5 ML IM: CPT | Performed by: ADVANCED PRACTICE MIDWIFE

## 2025-01-28 RX ORDER — ASPIRIN 81 MG/1
81 TABLET ORAL DAILY
Qty: 90 TABLET | Refills: 3 | Status: SHIPPED | OUTPATIENT
Start: 2025-01-28 | End: 2025-09-25

## 2025-01-28 NOTE — PATIENT INSTRUCTIONS
"Weeks 14 to 18 of Your Pregnancy: Care Instructions  Around this time, you may start to look pregnant. Your baby is now able to pass urine. And the first stool (meconium) is starting to collect in your baby's intestines. Hair is starting to grow on your baby's head.    You may notice some skin changes, such as itchy spots on your palms or acne on your face.   At your next doctor visit, you may have an ultrasound. So you might think about whether you want to know the sex of your baby. Also ask your doctor about flu and COVID-19 shots.      How to reduce stress   Ask for help when you need it.  Try to avoid things that cause you stress.  Seek out things that relieve stress, such as breathing exercises or yoga.     How to get exercise   If you don't usually exercise, start slowly. Short walks may be a good choice.  Try to be active 30 minutes a day, at least 5 days a week.  Avoid activities where you're more likely to fall.  Use light weights to reduce stress on your joints.     How to stay at a healthy weight for you   Talk to your doctor or midwife about how much weight you should gain.  It's generally best to gain:  About 28 to 40 pounds if you're underweight.  About 25 to 35 pounds if you're at a healthy weight.  About 15 to 25 pounds if you're overweight.  About 11 to 20 pounds if you're very overweight (obese).  Follow-up care is a key part of your treatment and safety. Be sure to make and go to all appointments, and call your doctor if you are having problems. It's also a good idea to know your test results and keep a list of the medicines you take.  Where can you learn more?  Go to https://www.Lightbox.net/patiented  Enter I453 in the search box to learn more about \"Weeks 14 to 18 of Your Pregnancy: Care Instructions.\"  Current as of: April 30, 2024  Content Version: 14.3    2024 [employer].   Care instructions adapted under license by your healthcare professional. If you have questions about a " medical condition or this instruction, always ask your healthcare professional. [employer] disclaims any warranty or liability for your use of this information.    Second-Trimester Fetal Ultrasound: About This Test  What is it?     Fetal ultrasound is a test that uses sound waves to make pictures of your baby (fetus) and placenta inside the uterus. The test is the safest way to find out the age, size, and position of your baby. You also may be able to find out the sex of your baby. (But the test isn't done just to find out a baby's sex.)  No known risks to the mother or the baby are linked to fetal ultrasound. But you may feel anxious if the test reveals a problem with your pregnancy or baby.  Why is this test done?  In the second trimester, a fetal ultrasound is done to:  Estimate the number of weeks and days a fetus has developed since the beginning of the pregnancy. This is called the gestational age.  Look at the size and position of the fetus, the placenta, and the fluid that surrounds the fetus.  Find major birth defects, such as heart problems or problems with the brain and spinal cord (neural tube defects). But the test may not be able to find many minor defects and some major birth defects.  How do you prepare for the test?  In general, there's nothing you have to do before this test, unless your doctor tells you to.  How is the test done?  You may be able to leave your clothes on, or you will be given a gown to wear.  You will lie on your back on a padded examination table.  A gel will be spread on your belly. It will be removed after the test.  A small, handheld device called a transducer will be pressed against the gel on your skin and moved across your belly several times.  You may watch the monitor to see the picture of your baby during the test.  What happens after the test?  You will probably be able to go home right away.  You most likely will be able to go back to your usual activities  "right away.  Follow-up care is a key part of your treatment and safety. Be sure to make and go to all appointments, and call your doctor if you are having problems. It's also a good idea to keep a list of the medicines you take. Ask your doctor when you can expect to have your test results.  Where can you learn more?  Go to https://www.Broadchoice.Moasis/patiented  Enter Y671 in the search box to learn more about \"Second-Trimester Fetal Ultrasound: About This Test.\"  Current as of: April 30, 2024  Content Version: 14.3    2024 [employer].   Care instructions adapted under license by your healthcare professional. If you have questions about a medical condition or this instruction, always ask your healthcare professional. [employer] disclaims any warranty or liability for your use of this information.    During Pregnancy: Exercises  Introduction  Here are some examples of exercises to do during your pregnancy. Start each exercise slowly. Ease off the exercise if you start to have pain.  Talk to your doctor about when you can start these exercises and which ones will work best for you.  How to do the exercises  Neck rotation    Sit up straight in a firm chair, or stand up straight. If you're standing, keep your feet about hip-width apart.  Keeping your chin level, turn your head to the right and hold for 15 to 30 seconds.  Turn your head to the left and hold for 15 to 30 seconds.  Repeat 2 to 4 times.  Neck stretch to the front    Sit up straight in a firm chair, or stand up straight. Look straight ahead. If you're standing, keep your feet about hip-width apart.  Slowly bend your head forward without moving your shoulders.  Hold for 15 to 30 seconds, then return to your starting position.  Repeat 2 to 4 times.  Back press    Stand with your back 10 to 12 inches away from a wall.  Lean into the wall until your back is against it. Press your lower back against the wall by pulling in your stomach " muscles.  Slowly slide down until your knees are slightly bent, pressing your lower back against the wall.  Hold for at least 6 seconds, then slide back up the wall.  Repeat 8 to 12 times.  Over time, work up to holding this position for as much as 1 minute.  Trunk twist    Sit on the floor with your legs crossed. If that's not comfortable, you can sit on a folded blanket so your bottom is a few inches off the floor. Or you can sit on a chair with your knees hip-width apart and your feet flat on the floor.  Reach your left hand toward your right knee. You can place your right hand at your side for support.  Slowly twist your body (trunk) to your right.  Relax and return to your starting position.  Repeat 2 to 4 times.  Switch your hands and twist to your left.  Repeat 2 to 4 times.  Pelvic rocking on hands and knees    Start on your hands and knees. Place your wrists directly below your shoulders and your knees below your hips.  Breathe in slowly. Tuck your head downward and round your back up, making a curve with your back in the shape of the letter C. Hold this position for about 6 seconds.  Breathe out slowly and bring your head back up. Relax, keeping your back straight. (Don't allow it to curve toward the floor.) Hold for about 6 seconds.  Repeat 8 to 12 times, gently rocking your pelvis.  Pelvic tilt    Lie on your back with your knees bent and your feet flat on the floor.  Tighten your belly muscles by pulling your belly button in toward your spine. Press your lower back to the floor. You should feel your hips and pelvis rock back.  Hold for 6 seconds while breathing smoothly, and then relax.  Repeat 8 to 12 times.  Do this exercise only during the first 4 months of pregnancy. After this point, lying on your back is not recommended, because it can cause blood flow problems for you and your baby.  Backward stretch    Start on your hands and knees with your knees 8 to 10 inches apart, hands directly below your  shoulders, and arms and back straight.  Keeping your arms straight, slowly lower your buttocks toward your heels and tuck your head toward your knees. Hold for 15 to 30 seconds.  Slowly return to the starting position.  Repeat 2 to 4 times.  Forward bend    Sit comfortably in a chair, with your arms relaxed.  Slowly bend forward, allowing your arms to hang down. Lean only as far as you can without feeling discomfort or pressure on your belly.  Hold for 15 to 30 seconds and then slowly sit up straight.  Repeat 2 to 4 times or to your comfort level.  Donkey kick    Start on your hands and knees. Place your hands directly below your shoulders, and keep your arms straight.  Tighten your belly muscles by pulling your belly button in toward your spine. Keep breathing normally, and don't hold your breath.  Lift one knee and bring it toward your elbow.  Slowly extend that leg behind you without completely straightening it. Be careful not to let your hip drop down. Avoid arching your back.  Hold your leg behind you for about 6 seconds.  Return to your starting position.  Repeat 8 to 12 times for each leg.  Tailor sitting    Sit on the floor.  Bring your feet close to your body while crossing your ankles.  Keep your back straight. Relax your legs and let your knees drop toward the floor.  Hold this position for as long as you are comfortable.  Toe reach    Sit on the floor with your back straight, legs about 12 inches apart, and feet relaxed outward.  Stretch your hands forward toward your right foot, then sit up.  Stretch your hands straight forward, then sit up.  Stretch your hands forward toward your left foot, then sit up.  Hold each stretch for 15 to 30 seconds.  Repeat 2 to 4 times.  Follow-up care is a key part of your treatment and safety. Be sure to make and go to all appointments, and call your doctor if you are having problems. It's also a good idea to know your test results and keep a list of the medicines you  take.  Current as of: April 30, 2024  Content Version: 14.3    2024 [employer].   Care instructions adapted under license by your healthcare professional. If you have questions about a medical condition or this instruction, always ask your healthcare professional. [employer] disclaims any warranty or liability for your use of this information.

## 2025-02-03 ENCOUNTER — MYC REFILL (OUTPATIENT)
Dept: PEDIATRICS | Facility: CLINIC | Age: 20
End: 2025-02-03
Payer: COMMERCIAL

## 2025-02-03 DIAGNOSIS — K59.00 CONSTIPATION, UNSPECIFIED CONSTIPATION TYPE: Primary | ICD-10-CM

## 2025-02-04 RX ORDER — DOCUSATE SODIUM 100 MG/1
100 CAPSULE, LIQUID FILLED ORAL DAILY
Qty: 30 CAPSULE | Refills: 0 | Status: SHIPPED | OUTPATIENT
Start: 2025-02-04 | End: 2025-03-06

## 2025-02-04 NOTE — TELEPHONE ENCOUNTER
Had visit stephie Jay 1/28/25 and next visit scheduled already 2/25/25    BONIFACIO Kidd RN on 2/4/2025 at 12:09 PM

## 2025-02-12 ENCOUNTER — TELEPHONE (OUTPATIENT)
Dept: MIDWIFE SERVICES | Facility: CLINIC | Age: 20
End: 2025-02-12
Payer: COMMERCIAL

## 2025-02-12 NOTE — TELEPHONE ENCOUNTER
RN sent a [employer] message w guidelines and page to refer to in her pregnancy book.    Selene Kidd RN on 2/12/2025 at 11:14 AM

## 2025-02-12 NOTE — TELEPHONE ENCOUNTER
[facility]    Phone Message    May a detailed message be left on voicemail: yes     Reason for Call: Other: Pt is calling to discuss with care team if shrimp is safe to eat during pregnancy. PT OK with a mychart message instead of call back. Please reach out to pt to discuss.       Action Taken: Other: OBGYN    Travel Screening: Not Applicable     Date of Service:

## 2025-02-16 PROBLEM — O09.91 SUPERVISION OF HIGH RISK PREGNANCY IN FIRST TRIMESTER: Status: ACTIVE | Noted: 2024-12-26

## 2025-02-16 NOTE — PATIENT INSTRUCTIONS
"\"I hope you had a positive experience and that you can definitely recommend [facility] to your family and friends. You ll be receiving a survey soon and I would look forward to hearing your feedback\".    Melissa DIANA CNM    Weeks 14 to 18 of Your Pregnancy: Care Instructions  Around this time, you may start to look pregnant. Your baby is now able to pass urine. And the first stool (meconium) is starting to collect in your baby's intestines. Hair is starting to grow on your baby's head.    You may notice some skin changes, such as itchy spots on your palms or acne on your face.   At your next doctor visit, you may have an ultrasound. So you might think about whether you want to know the sex of your baby. Also ask your doctor about flu and COVID-19 shots.      How to reduce stress   Ask for help when you need it.  Try to avoid things that cause you stress.  Seek out things that relieve stress, such as breathing exercises or yoga.     How to get exercise   If you don't usually exercise, start slowly. Short walks may be a good choice.  Try to be active 30 minutes a day, at least 5 days a week.  Avoid activities where you're more likely to fall.  Use light weights to reduce stress on your joints.     How to stay at a healthy weight for you   Talk to your doctor or midwife about how much weight you should gain.  It's generally best to gain:  About 28 to 40 pounds if you're underweight.  About 25 to 35 pounds if you're at a healthy weight.  About 15 to 25 pounds if you're overweight.  About 11 to 20 pounds if you're very overweight (obese).  Follow-up care is a key part of your treatment and safety. Be sure to make and go to all appointments, and call your doctor if you are having problems. It's also a good idea to know your test results and keep a list of the medicines you take.  Where can you learn more?  Go to https://www.healthwise.net/patiented  Enter I453 in the search box to learn more about " "\"Weeks 14 to 18 of Your Pregnancy: Care Instructions.\"  Current as of: April 30, 2024  Content Version: 14.3    2024 [employer].   Care instructions adapted under license by your healthcare professional. If you have questions about a medical condition or this instruction, always ask your healthcare professional. [employer] disclaims any warranty or liability for your use of this information.    Second-Trimester Fetal Ultrasound: About This Test  What is it?     Fetal ultrasound is a test that uses sound waves to make pictures of your baby (fetus) and placenta inside the uterus. The test is the safest way to find out the age, size, and position of your baby. You also may be able to find out the sex of your baby. (But the test isn't done just to find out a baby's sex.)  No known risks to the mother or the baby are linked to fetal ultrasound. But you may feel anxious if the test reveals a problem with your pregnancy or baby.  Why is this test done?  In the second trimester, a fetal ultrasound is done to:  Estimate the number of weeks and days a fetus has developed since the beginning of the pregnancy. This is called the gestational age.  Look at the size and position of the fetus, the placenta, and the fluid that surrounds the fetus.  Find major birth defects, such as heart problems or problems with the brain and spinal cord (neural tube defects). But the test may not be able to find many minor defects and some major birth defects.  How do you prepare for the test?  In general, there's nothing you have to do before this test, unless your doctor tells you to.  How is the test done?  You may be able to leave your clothes on, or you will be given a gown to wear.  You will lie on your back on a padded examination table.  A gel will be spread on your belly. It will be removed after the test.  A small, handheld device called a transducer will be pressed against the gel on your skin and moved across " "your belly several times.  You may watch the monitor to see the picture of your baby during the test.  What happens after the test?  You will probably be able to go home right away.  You most likely will be able to go back to your usual activities right away.  Follow-up care is a key part of your treatment and safety. Be sure to make and go to all appointments, and call your doctor if you are having problems. It's also a good idea to keep a list of the medicines you take. Ask your doctor when you can expect to have your test results.  Where can you learn more?  Go to https://www.[employer].net/patiented  Enter Y671 in the search box to learn more about \"Second-Trimester Fetal Ultrasound: About This Test.\"  Current as of: April 30, 2024  Content Version: 14.3    2024 [employer].   Care instructions adapted under license by your healthcare professional. If you have questions about a medical condition or this instruction, always ask your healthcare professional. [employer] disclaims any warranty or liability for your use of this information.    During Pregnancy: Exercises  Introduction  Here are some examples of exercises to do during your pregnancy. Start each exercise slowly. Ease off the exercise if you start to have pain.  Talk to your doctor about when you can start these exercises and which ones will work best for you.  How to do the exercises  Neck rotation    Sit up straight in a firm chair, or stand up straight. If you're standing, keep your feet about hip-width apart.  Keeping your chin level, turn your head to the right and hold for 15 to 30 seconds.  Turn your head to the left and hold for 15 to 30 seconds.  Repeat 2 to 4 times.  Neck stretch to the front    Sit up straight in a firm chair, or stand up straight. Look straight ahead. If you're standing, keep your feet about hip-width apart.  Slowly bend your head forward without moving your shoulders.  Hold for 15 to 30 seconds, then " return to your starting position.  Repeat 2 to 4 times.  Back press    Stand with your back 10 to 12 inches away from a wall.  Lean into the wall until your back is against it. Press your lower back against the wall by pulling in your stomach muscles.  Slowly slide down until your knees are slightly bent, pressing your lower back against the wall.  Hold for at least 6 seconds, then slide back up the wall.  Repeat 8 to 12 times.  Over time, work up to holding this position for as much as 1 minute.  Trunk twist    Sit on the floor with your legs crossed. If that's not comfortable, you can sit on a folded blanket so your bottom is a few inches off the floor. Or you can sit on a chair with your knees hip-width apart and your feet flat on the floor.  Reach your left hand toward your right knee. You can place your right hand at your side for support.  Slowly twist your body (trunk) to your right.  Relax and return to your starting position.  Repeat 2 to 4 times.  Switch your hands and twist to your left.  Repeat 2 to 4 times.  Pelvic rocking on hands and knees    Start on your hands and knees. Place your wrists directly below your shoulders and your knees below your hips.  Breathe in slowly. Tuck your head downward and round your back up, making a curve with your back in the shape of the letter C. Hold this position for about 6 seconds.  Breathe out slowly and bring your head back up. Relax, keeping your back straight. (Don't allow it to curve toward the floor.) Hold for about 6 seconds.  Repeat 8 to 12 times, gently rocking your pelvis.  Pelvic tilt    Lie on your back with your knees bent and your feet flat on the floor.  Tighten your belly muscles by pulling your belly button in toward your spine. Press your lower back to the floor. You should feel your hips and pelvis rock back.  Hold for 6 seconds while breathing smoothly, and then relax.  Repeat 8 to 12 times.  Do this exercise only during the first 4 months of  pregnancy. After this point, lying on your back is not recommended, because it can cause blood flow problems for you and your baby.  Backward stretch    Start on your hands and knees with your knees 8 to 10 inches apart, hands directly below your shoulders, and arms and back straight.  Keeping your arms straight, slowly lower your buttocks toward your heels and tuck your head toward your knees. Hold for 15 to 30 seconds.  Slowly return to the starting position.  Repeat 2 to 4 times.  Forward bend    Sit comfortably in a chair, with your arms relaxed.  Slowly bend forward, allowing your arms to hang down. Lean only as far as you can without feeling discomfort or pressure on your belly.  Hold for 15 to 30 seconds and then slowly sit up straight.  Repeat 2 to 4 times or to your comfort level.  Donkey kick    Start on your hands and knees. Place your hands directly below your shoulders, and keep your arms straight.  Tighten your belly muscles by pulling your belly button in toward your spine. Keep breathing normally, and don't hold your breath.  Lift one knee and bring it toward your elbow.  Slowly extend that leg behind you without completely straightening it. Be careful not to let your hip drop down. Avoid arching your back.  Hold your leg behind you for about 6 seconds.  Return to your starting position.  Repeat 8 to 12 times for each leg.  Tailor sitting    Sit on the floor.  Bring your feet close to your body while crossing your ankles.  Keep your back straight. Relax your legs and let your knees drop toward the floor.  Hold this position for as long as you are comfortable.  Toe reach    Sit on the floor with your back straight, legs about 12 inches apart, and feet relaxed outward.  Stretch your hands forward toward your right foot, then sit up.  Stretch your hands straight forward, then sit up.  Stretch your hands forward toward your left foot, then sit up.  Hold each stretch for 15 to 30 seconds.  Repeat 2 to 4  times.  Follow-up care is a key part of your treatment and safety. Be sure to make and go to all appointments, and call your doctor if you are having problems. It's also a good idea to know your test results and keep a list of the medicines you take.  Current as of: April 30, 2024  Content Version: 14.3    2024 [employer].   Care instructions adapted under license by your healthcare professional. If you have questions about a medical condition or this instruction, always ask your healthcare professional. [employer] disclaims any warranty or liability for your use of this information.

## 2025-02-16 NOTE — PROGRESS NOTES
17w4d  Feels well overall. Here with Ramonita today  LDA: Yes, is taking  Fetal movement: present  Denies loss of fluid/vb/contractions/pelvic pain  GC/Chlamydia urine collection today    Anatomy ultrasound next visit between 19-21 weeks. Will have done with KELLY. US already scheduled    Return to clinic 4 weeks      Melissa DIANA CNM

## 2025-02-18 ENCOUNTER — PRE VISIT (OUTPATIENT)
Dept: MATERNAL FETAL MEDICINE | Facility: CLINIC | Age: 20
End: 2025-02-18

## 2025-02-18 ENCOUNTER — PRENATAL OFFICE VISIT (OUTPATIENT)
Dept: MIDWIFE SERVICES | Facility: CLINIC | Age: 20
End: 2025-02-18
Payer: COMMERCIAL

## 2025-02-18 VITALS — BODY MASS INDEX: 37.73 KG/M2 | WEIGHT: 221 LBS | SYSTOLIC BLOOD PRESSURE: 124 MMHG | DIASTOLIC BLOOD PRESSURE: 80 MMHG

## 2025-02-18 DIAGNOSIS — O09.92 SUPERVISION OF HIGH RISK PREGNANCY IN SECOND TRIMESTER: Primary | ICD-10-CM

## 2025-02-18 DIAGNOSIS — Z11.3 SCREEN FOR STD (SEXUALLY TRANSMITTED DISEASE): ICD-10-CM

## 2025-02-18 DIAGNOSIS — Z3A.17 17 WEEKS GESTATION OF PREGNANCY: ICD-10-CM

## 2025-02-18 DIAGNOSIS — O09.91 SUPERVISION OF HIGH RISK PREGNANCY IN FIRST TRIMESTER: Primary | ICD-10-CM

## 2025-02-18 PROCEDURE — 87591 N.GONORRHOEAE DNA AMP PROB: CPT | Performed by: ADVANCED PRACTICE MIDWIFE

## 2025-02-18 PROCEDURE — 99207 PR PRENATAL VISIT: CPT | Performed by: ADVANCED PRACTICE MIDWIFE

## 2025-02-18 PROCEDURE — 87491 CHLMYD TRACH DNA AMP PROBE: CPT | Performed by: ADVANCED PRACTICE MIDWIFE

## 2025-02-19 LAB
C TRACH DNA SPEC QL NAA+PROBE: NEGATIVE
N GONORRHOEA DNA SPEC QL NAA+PROBE: NEGATIVE
SPECIMEN TYPE: NORMAL
SPECIMEN TYPE: NORMAL

## 2025-02-21 ENCOUNTER — TELEPHONE (OUTPATIENT)
Dept: MIDWIFE SERVICES | Facility: CLINIC | Age: 20
End: 2025-02-21
Payer: COMMERCIAL

## 2025-02-21 NOTE — TELEPHONE ENCOUNTER
Caller reporting the following red-flag symptom(s): Patient states she started to experiencing cramping yesterday 2/20/2025 and still slightly feels cramping, GA: 18w 0d    Per the system red-flag symptom policy, patient was instructed to:  speak with a Registered Nurse    Action:  Message sent to the clinic    Patient denies vaginal bleeding or spotting. Writer unable to reach triage team via phone and patient is requesting call back.

## 2025-02-21 NOTE — TELEPHONE ENCOUNTER
18w0d    Called and spoke to pt     Noted to have some cramping that started around 1pm yesterday   Felt mild, not painful    Felt cramping when going to the bathroom, she does endorse constipation  Felt a little cramping a little early this morning, but not currently cramping now     Denies bleeding   Denies vaginal odor or pain with urination   Denies fever or chills     Has noticed some small amount of discharge but this has been throughout pregnancy and discharge is not constant. Discharge is clear/white, thin in consistency and a very small amount    Reports she has been trying to stay hydrated - encouraged pt to push fluids, increase fiber in diet, and take walks during day to help prevent constipation  She is taking colace PRN    Dicussed round ligament pain in pregnancy, reviewed remedies for round ligament pain     Feeling nervous about developing PreE - she is on baby ASA.   Reviewed s/s PreE and when to call    ER precautions reviewed for severe pain not relieved with medications or rest    Pt to call clinic is experiencing foul odors, increased or changing discharge (color, consistency), or has bleeding    Pt verbalizes understanding and reports no further questions    Routing to provider as BONIFACIO Pagan RN on 2/21/2025 at 12:30 PM  [facility] Triage

## 2025-02-25 ENCOUNTER — HOSPITAL ENCOUNTER (OUTPATIENT)
Dept: ULTRASOUND IMAGING | Facility: CLINIC | Age: 20
Discharge: HOME | End: 2025-02-25
Attending: STUDENT IN AN ORGANIZED HEALTH CARE EDUCATION/TRAINING PROGRAM
Payer: COMMERCIAL

## 2025-02-25 ENCOUNTER — OFFICE VISIT (OUTPATIENT)
Dept: MATERNAL FETAL MEDICINE | Facility: CLINIC | Age: 20
End: 2025-02-25
Attending: STUDENT IN AN ORGANIZED HEALTH CARE EDUCATION/TRAINING PROGRAM
Payer: COMMERCIAL

## 2025-02-25 DIAGNOSIS — O99.210 OBESITY IN PREGNANCY, ANTEPARTUM: Primary | ICD-10-CM

## 2025-02-25 DIAGNOSIS — O35.03X0 CHOROID PLEXUS CYST OF FETUS AFFECTING CARE OF MOTHER, ANTEPARTUM, SINGLE OR UNSPECIFIED FETUS: ICD-10-CM

## 2025-02-25 DIAGNOSIS — O26.90 PREGNANCY RELATED CONDITION, ANTEPARTUM: ICD-10-CM

## 2025-02-25 DIAGNOSIS — O35.EXX0 PYELECTASIS OF FETUS ON PRENATAL ULTRASOUND: ICD-10-CM

## 2025-02-25 DIAGNOSIS — Z36.89 ENCOUNTER FOR FETAL ANATOMIC SURVEY: ICD-10-CM

## 2025-02-25 PROCEDURE — 76811 OB US DETAILED SNGL FETUS: CPT

## 2025-02-25 NOTE — PROGRESS NOTES
The patient was seen for an ultrasound in the [facility] today.  For a detailed report of the ultrasound examination, please see the ultrasound report which can be found under the imaging tab.    If you have questions regarding today's evaluation or if we can be of further service, please contact the [facility].    Zara Silverman MD  , OB/GYN  Maternal-Fetal Medicine

## 2025-02-25 NOTE — NURSING NOTE
Patient presents to [facility] for L2 at 18w4d due to BMI 38. Denies LOF, vaginal bleeding, cramping/contractions. SBAR given to [facility], see their note in Epic.

## 2025-03-05 ENCOUNTER — MYC REFILL (OUTPATIENT)
Dept: MIDWIFE SERVICES | Facility: CLINIC | Age: 20
End: 2025-03-05
Payer: COMMERCIAL

## 2025-03-05 DIAGNOSIS — O09.91 SUPERVISION OF HIGH RISK PREGNANCY IN FIRST TRIMESTER: ICD-10-CM

## 2025-03-05 RX ORDER — ASPIRIN 81 MG/1
81 TABLET ORAL DAILY
Qty: 90 TABLET | Refills: 3 | OUTPATIENT
Start: 2025-03-05

## 2025-03-05 NOTE — TELEPHONE ENCOUNTER
Requested Prescriptions   Pending Prescriptions Disp Refills    aspirin 81 MG EC tablet 90 tablet 3     Sig: Take 1 tablet (81 mg) by mouth daily. Start at 12 weeks of pregnancy (1/10/25).       Analgesics (Non-Narcotic Tylenol and ASA Only) Passed - 3/5/2025  3:44 PM        Passed - Patient is age 20 years or older     If ASA is flagged for ages under 20 years old. Forward to provider for confirmation Ryes Syndrome is not a concern.              Passed - Medication is active on med list and the sig matches. RN to manually verify dose and sig if red X/fail.     If the protocol passes (green check), you do not need to verify med dose and sig.    A prescription matches if they are the same clinical intention.    For Example: once daily and every morning are the same.    The protocol can not identify upper and lower case letters as matching and will fail.     For Example: Take 1 tablet (50 mg) by mouth daily     TAKE 1 TABLET (50 MG) BY MOUTH DAILY    For all fails (red x), verify dose and sig.    If the refill does match what is on file, the RN can still proceed to approve the refill request.       If they do not match, route to the appropriate provider.             Passed - Medication matches indication     Aspirin is associated with one or more of the following diagnoses:  Pain  Fever  Cerebrovascular Accident  Headache  Myocardial Infarction  Osteoarthritis  Rheumatoid Arthritis  Systemic Lupus  TIA  Angina            Passed - Recent (12 mo) or future (90 days) visit within the authorizing provider's specialty     The patient must have completed an in-person or virtual visit within the past 12 months or has a future visit scheduled within the next 90 days with the authorizing provider s specialty.  Urgent care and e-visits do not qualify as an office visit for this protocol.             Last Written Prescription Date:  1/28/25  Last Fill Quantity: 90,  # refills: 3   Last office visit: Visit date not found ; last  virtual visit: Visit date not found with prescribing provider:  Enedelia Jay    Future Office Visit:   Next 5 appointments (look out 90 days)      Mar 10, 2025 8:20 AM  (Arrive by 8:05 AM)  Return OB Visit with ADALGISA Good CNM  [facility] ([facility] ) [address] [address]  [phone]     Apr 08, 2025 9:20 AM  (Arrive by 9:05 AM)  Return OB Visit with ADALGISA Navarro CNM  [facility] ([facility] ) [address] [address]  [phone]           Rx denied - should have refills on file    Erica Pagan, RN on 3/5/2025 at 3:45 PM  [facility] Triage

## 2025-03-10 ENCOUNTER — PRENATAL OFFICE VISIT (OUTPATIENT)
Dept: MIDWIFE SERVICES | Facility: CLINIC | Age: 20
End: 2025-03-10
Payer: COMMERCIAL

## 2025-03-10 VITALS — SYSTOLIC BLOOD PRESSURE: 122 MMHG | BODY MASS INDEX: 39.95 KG/M2 | WEIGHT: 234 LBS | DIASTOLIC BLOOD PRESSURE: 76 MMHG

## 2025-03-10 DIAGNOSIS — Z3A.20 20 WEEKS GESTATION OF PREGNANCY: ICD-10-CM

## 2025-03-10 DIAGNOSIS — O35.EXX0 PYELECTASIS OF FETUS ON PRENATAL ULTRASOUND: ICD-10-CM

## 2025-03-10 DIAGNOSIS — O35.03X0 CHOROID PLEXUS CYST OF FETUS IN SINGLETON PREGNANCY: ICD-10-CM

## 2025-03-10 DIAGNOSIS — O09.92 SUPERVISION OF HIGH RISK PREGNANCY IN SECOND TRIMESTER: Primary | ICD-10-CM

## 2025-03-10 DIAGNOSIS — O99.212 OBESITY AFFECTING PREGNANCY IN SECOND TRIMESTER, UNSPECIFIED OBESITY TYPE: ICD-10-CM

## 2025-03-10 PROCEDURE — 99207 PR PRENATAL VISIT: CPT | Performed by: NURSE PRACTITIONER

## 2025-03-10 PROCEDURE — 3074F SYST BP LT 130 MM HG: CPT | Performed by: NURSE PRACTITIONER

## 2025-03-10 PROCEDURE — 0502F SUBSEQUENT PRENATAL CARE: CPT | Performed by: NURSE PRACTITIONER

## 2025-03-10 PROCEDURE — 3078F DIAST BP <80 MM HG: CPT | Performed by: NURSE PRACTITIONER

## 2025-03-10 NOTE — PROGRESS NOTES
20w3d  Feels well overall. Here with Ramonita today. Still recovering from a cold - experiencing some headaches and congestion.  Fetal movement: not present  Denies loss of fluid/vb/contractions    Anatomy ultrasound results discussed, done at [facility] 2/25/25:  EFW 241g/ 37%ile, Placenta: Anterior  Bilateral fetal urinary tract dilation, bilateral choroid plexus cysts, overall growth appropriate for gestation.  Follow up ultrasound to be done a [facility].   F/up US already scheduled    GCT visit between 24-28 weeks  Round ligament pain and comfort measures reviewed  Recommended Flonase to relieve nasal congestion - agreed with this plan.  Return to clinic 4 weeks    Christy Dubon, Student Nurse Midwife     I was present with the student who participated in the service and in the documentation of the note. I have verified the history and personally performed the physical exam and medical decision-making. I agree with the assessment and plan of care as documented in the note.    Ronda DIANA, JOSE GUADALUPE, [facility]  [phone]    **Has f/up US at [facility], 32wk growth US here or at [facility]?  **needs 1xwk BPPs scheduled starting at 37 wks

## 2025-03-10 NOTE — PATIENT INSTRUCTIONS
"Weeks 18 to 22 of Your Pregnancy: Care Instructions  At this stage you may find that your nausea and fatigue are gone. You may feel better overall and have more energy. But you might now also have some new discomforts, like sleep problems or leg cramps.    You may start to feel your baby move. These movements can feel like butterflies or bubbles.   Babies at this stage can now suck their thumbs.     Get some exercise every day.  And avoid caffeine late in the day.     Take a warm shower or bath before bed.  Try relaxation exercises to calm your mind and body.     Use extra pillows.  They can help you get comfortable.     Don't use sleeping pills or alcohol.  They could harm your baby.     For leg cramps, stretch and apply heat.  A warm bath, leg warmers, a heating pad, or a hot water bottle can help with muscle aches.   Stretches for leg cramps    Straighten your leg and bend your foot (flex your ankle) slowly upward, toward your knee. Bend your toes up and down.   Stand on a flat surface. Stretch your toes upward. For balance, hold on to the wall or something stable. If it feels okay, take small steps walking on your heels.   Follow-up care is a key part of your treatment and safety. Be sure to make and go to all appointments, and call your doctor if you are having problems. It's also a good idea to know your test results and keep a list of the medicines you take.  Where can you learn more?  Go to https://www.[employer]net/patiented  Enter W603 in the search box to learn more about \"Weeks 18 to 22 of Your Pregnancy: Care Instructions.\"  Current as of: April 30, 2024  Content Version: 14.3    2024 [employer].   Care instructions adapted under license by your healthcare professional. If you have questions about a medical condition or this instruction, always ask your healthcare professional. [employer] disclaims any warranty or liability for your use of this information.    Weeks 22 to 26 of " "Your Pregnancy: Care Instructions  Your baby's lungs are getting ready for breathing. Your baby may respond to your voice. Your baby likely turns less, and kicks or jerks more. Jerking may mean that your baby has hiccups.    Think about taking childbirth classes. And start to think about whether you want to have pain medicine during labor.   At your next doctor visit, you may be tested for anemia and for high blood sugar that first occurs during pregnancy (gestational diabetes). These conditions can cause problems for you and your baby.         To ease discomfort, such as back pain   Change your position often. Try not to sit or stand for too long.  Get some exercise. Things like walking or stretching may help.  Try using a heating pad or cold pack.        To ease or reduce swelling in your feet, ankles, hands, and fingers   Take off your rings.  Avoid high-sodium foods, such as potato chips.  Prop up your feet, and sleep with pillows under your feet.  Try to avoid standing for long periods of time.  Do not wear tight shoes.  Wear support stockings.  Kegel exercises to prevent urine from leaking    Squeeze your muscles as if you were trying not to pass gas. Your belly, legs, and buttocks shouldn't move. Hold the squeeze for 3 seconds, then relax for 5 to 10 seconds.    Add 1 second each week until you can squeeze for 10 seconds. Repeat the exercise 10 times a session. Do 3 to 8 sessions a day. If these exercises cause you pain, stop doing them and talk with your doctor.  Follow-up care is a key part of your treatment and safety. Be sure to make and go to all appointments, and call your doctor if you are having problems. It's also a good idea to know your test results and keep a list of the medicines you take.  Where can you learn more?  Go to https://www.healthwise.net/patiented  Enter G264 in the search box to learn more about \"Weeks 22 to 26 of Your Pregnancy: Care Instructions.\"  Current as of: April 30, " 2024  Content Version: 14.3    2024 [employer].   Care instructions adapted under license by your healthcare professional. If you have questions about a medical condition or this instruction, always ask your healthcare professional. [employer] disclaims any warranty or liability for your use of this information.    Learning About Screening for Gestational Diabetes  What is gestational diabetes screening?     Screening for gestational diabetes is a way to look for high blood sugar during pregnancy. You drink some very sweet liquid. Then you have a blood test to see how your body uses sugar (glucose).  How is gestational diabetes screening done?  Screening for gestational diabetes may be done in a couple of ways.  Two-part screening.  Part one (glucose challenge test): A blood sample is taken after you drink a liquid that contains sugar (glucose). You don't need to stop eating or drinking before this test. If the test shows that you don't have a lot of sugar in your blood, you don't have gestational diabetes.  Part two (oral glucose tolerance test, or OGTT): If the first test shows a lot of sugar in your blood, then you may have an OGTT. You can't eat or drink for at least 8 hours before this test. A blood sample is taken, then you drink a sweet liquid. You have more blood tests after 1 to 3 hours. If the OGTT shows that you have a lot of sugar in your blood, you may have gestational diabetes.  One-part screening.  Sometimes doctors use the OGTT on its own. If the test shows that you don't have a lot of sugar in your blood, you don't have gestational diabetes. If you do have a lot of sugar in your blood, you may have the condition.  What are the risks of screening?  Your blood glucose level may drop very low toward the end of the test. If this happens, you may feel weak, hungry, and restless. Tell your doctor if you have these symptoms. The test usually will be stopped.  You may vomit after  "drinking the sweet liquid. If this happens, you may need to take the test at a later time.  Your doctor may do more glucose tests at other times during your pregnancy.  Follow-up care is a key part of your treatment and safety. Be sure to make and go to all appointments, and call your doctor if you are having problems. It's also a good idea to know your test results and keep a list of the medicines you take.  Where can you learn more?  Go to https://www.[employer].net/patiented  Enter A472 in the search box to learn more about \"Learning About Screening for Gestational Diabetes.\"  Current as of: April 30, 2024  Content Version: 14.3    2024 [employer].   Care instructions adapted under license by your healthcare professional. If you have questions about a medical condition or this instruction, always ask your healthcare professional. [employer] disclaims any warranty or liability for your use of this information.    "

## 2025-03-13 DIAGNOSIS — K59.00 CONSTIPATION, UNSPECIFIED CONSTIPATION TYPE: ICD-10-CM

## 2025-03-13 NOTE — TELEPHONE ENCOUNTER
PHARMACY REQUESTS PRESCRITION RENEWAL DUE TO  PRESCRIPTION ORDER.    RX not on current medication list.

## 2025-03-14 RX ORDER — DOCUSATE SODIUM 100 MG/1
100 CAPSULE, LIQUID FILLED ORAL DAILY
Qty: 30 CAPSULE | Refills: 0 | Status: CANCELLED | OUTPATIENT
Start: 2025-03-14

## 2025-03-17 NOTE — TELEPHONE ENCOUNTER
Reviewed with patient. She is still taking colace medication but does not currently need a refill. Will disregard and close encounter.    Shelbie Saldana RN

## 2025-03-21 NOTE — RESULT ENCOUNTER NOTE
Continue risk factor modification.  He is planning on undergoing our [employer] physical which will include an echocardiogram later this year.  Orders:    CBC and differential; Future    Comprehensive metabolic panel; Future    Lipid Panel with Direct LDL reflex; Future     Please call patient on her personal cell phone and let her know that her routine STD testing was all negative.  We recommend repeat testing every 6 months in this age group.   Her urine testing did not show infection either, so I am not sure what is causing her discomfort.  Perhaps it is a physical irritation - clothing that is too tight, or wet clothing being worn for a long time?  Other irritants - soaps, douches?  May apply OTC A&D ointment for comfort.  If symptoms do not improve in 1 week, recommend follow up.     Electronically signed by:  Alem Nagel MD  Pediatrics  [facility]

## 2025-03-29 DIAGNOSIS — K59.00 CONSTIPATION, UNSPECIFIED CONSTIPATION TYPE: ICD-10-CM

## 2025-04-01 RX ORDER — DOCUSATE SODIUM 100 MG/1
CAPSULE, LIQUID FILLED ORAL
Qty: 100 CAPSULE | Refills: 1 | Status: SHIPPED | OUTPATIENT
Start: 2025-04-01

## 2025-04-01 NOTE — TELEPHONE ENCOUNTER
Confirmed with patient that she does still take this medication and would like refill. Request routed to provider for review. Patient declines need for triage assessment for constipation stating she is managing well. Currently 23 weeks pregnant per chart review. Patient/Caller with no further questions or concerns.     Shelbie Saldana RN

## 2025-04-02 ENCOUNTER — HOSPITAL ENCOUNTER (OUTPATIENT)
Dept: ULTRASOUND IMAGING | Facility: CLINIC | Age: 20
Discharge: HOME | End: 2025-04-02
Attending: STUDENT IN AN ORGANIZED HEALTH CARE EDUCATION/TRAINING PROGRAM
Payer: COMMERCIAL

## 2025-04-02 ENCOUNTER — OFFICE VISIT (OUTPATIENT)
Dept: MATERNAL FETAL MEDICINE | Facility: CLINIC | Age: 20
End: 2025-04-02
Attending: STUDENT IN AN ORGANIZED HEALTH CARE EDUCATION/TRAINING PROGRAM
Payer: COMMERCIAL

## 2025-04-02 DIAGNOSIS — Z36.2 ENCOUNTER FOR FOLLOW-UP ULTRASOUND OF FETAL ANATOMY: ICD-10-CM

## 2025-04-02 DIAGNOSIS — O35.EXX0 PYELECTASIS OF FETUS ON PRENATAL ULTRASOUND: Primary | ICD-10-CM

## 2025-04-02 DIAGNOSIS — O99.210 OBESITY IN PREGNANCY, ANTEPARTUM: ICD-10-CM

## 2025-04-02 PROCEDURE — 76816 OB US FOLLOW-UP PER FETUS: CPT

## 2025-04-02 NOTE — NURSING NOTE
Patient presents to [facility] for RL2 at 23w5d due to suboptimal anatomy, bilateral UTD, bilateral CPC's on previous US. Positive fetal movement. Denies LOF, vaginal bleeding or cramping/contractions. SBAR given to TING MD, see their note in Epic.

## 2025-04-05 NOTE — PROGRESS NOTES
24w4d  Feels well overall. Here with Ramonita today. Drink was very sweet, working on pre registration. Feeling a lot more movement now! Curious about using tub/waterbirth. Baby shower in May. Would like things discussed with her during labor before they are done.  Fetal movement: present   Denies loss of fluid/vb/contractions, signs and symptoms preeclampsia  GCT, CBC drawn today  Tdap next visit; reviewed CDC recommendations and partner/family vaccination recommended as well  Water birth discussed, patient is interested, handout given  Birth preferences sheet given and reviewed  Reviewed routine delayed cord clamping and skin to skin, not routine to cut episiotomy  Counseled on signs and symptoms of PTL, preeclampsia, discussed fetal movement awareness  MFM growth in June  Return to clinic 4 weeks    ADALGISA Horn, JOSE GUADALUPE

## 2025-04-08 ENCOUNTER — PRENATAL OFFICE VISIT (OUTPATIENT)
Dept: MIDWIFE SERVICES | Facility: CLINIC | Age: 20
End: 2025-04-08
Payer: COMMERCIAL

## 2025-04-08 ENCOUNTER — LAB (OUTPATIENT)
Dept: LAB | Facility: CLINIC | Age: 20
End: 2025-04-08
Payer: COMMERCIAL

## 2025-04-08 VITALS — SYSTOLIC BLOOD PRESSURE: 124 MMHG | BODY MASS INDEX: 39.57 KG/M2 | WEIGHT: 231.8 LBS | DIASTOLIC BLOOD PRESSURE: 72 MMHG

## 2025-04-08 DIAGNOSIS — O09.92 SUPERVISION OF HIGH RISK PREGNANCY IN SECOND TRIMESTER: Primary | ICD-10-CM

## 2025-04-08 DIAGNOSIS — Z3A.24 24 WEEKS GESTATION OF PREGNANCY: ICD-10-CM

## 2025-04-08 DIAGNOSIS — O35.03X0 CHOROID PLEXUS CYST OF FETUS IN SINGLETON PREGNANCY: ICD-10-CM

## 2025-04-08 DIAGNOSIS — O09.92 SUPERVISION OF HIGH RISK PREGNANCY IN SECOND TRIMESTER: ICD-10-CM

## 2025-04-08 DIAGNOSIS — O99.212 OBESITY AFFECTING PREGNANCY IN SECOND TRIMESTER, UNSPECIFIED OBESITY TYPE: ICD-10-CM

## 2025-04-08 DIAGNOSIS — O35.EXX0 PYELECTASIS OF FETUS ON PRENATAL ULTRASOUND: ICD-10-CM

## 2025-04-08 PROBLEM — E66.01 CLASS 2 SEVERE OBESITY DUE TO EXCESS CALORIES WITH SERIOUS COMORBIDITY IN ADULT (H): Status: RESOLVED | Noted: 2024-10-07 | Resolved: 2025-04-08

## 2025-04-08 PROBLEM — E66.812 CLASS 2 SEVERE OBESITY DUE TO EXCESS CALORIES WITH SERIOUS COMORBIDITY IN ADULT (H): Status: RESOLVED | Noted: 2024-10-07 | Resolved: 2025-04-08

## 2025-04-08 LAB
ERYTHROCYTE [DISTWIDTH] IN BLOOD BY AUTOMATED COUNT: 13.1 % (ref 10–15)
GLUCOSE 1H P 50 G GLC PO SERPL-MCNC: 89 MG/DL (ref 70–129)
HCT VFR BLD AUTO: 38 % (ref 35–47)
HGB BLD-MCNC: 12.6 G/DL (ref 11.7–15.7)
MCH RBC QN AUTO: 31 PG (ref 26.5–33)
MCHC RBC AUTO-ENTMCNC: 33.2 G/DL (ref 31.5–36.5)
MCV RBC AUTO: 94 FL (ref 78–100)
PLATELET # BLD AUTO: 287 10E3/UL (ref 150–450)
RBC # BLD AUTO: 4.06 10E6/UL (ref 3.8–5.2)
T PALLIDUM AB SER QL: NONREACTIVE
WBC # BLD AUTO: 16.1 10E3/UL (ref 4–11)

## 2025-04-08 PROCEDURE — 86780 TREPONEMA PALLIDUM: CPT

## 2025-04-08 PROCEDURE — 82950 GLUCOSE TEST: CPT

## 2025-04-08 PROCEDURE — 85027 COMPLETE CBC AUTOMATED: CPT

## 2025-04-08 PROCEDURE — 99207 PR PRENATAL VISIT: CPT | Performed by: ADVANCED PRACTICE MIDWIFE

## 2025-04-08 PROCEDURE — 3078F DIAST BP <80 MM HG: CPT | Performed by: ADVANCED PRACTICE MIDWIFE

## 2025-04-08 PROCEDURE — 0502F SUBSEQUENT PRENATAL CARE: CPT | Performed by: ADVANCED PRACTICE MIDWIFE

## 2025-04-08 PROCEDURE — 3074F SYST BP LT 130 MM HG: CPT | Performed by: ADVANCED PRACTICE MIDWIFE

## 2025-04-08 PROCEDURE — 36415 COLL VENOUS BLD VENIPUNCTURE: CPT

## 2025-04-08 NOTE — PATIENT INSTRUCTIONS
You have been provided the My Labor and Birth Wishes document.  Please review at home and bring to your next prenatal visit. Bring this sheet to the hospital for your birth. Give copies to your care team members and support person.   Additional copies can be found here:  [email or web address]

## 2025-05-05 ENCOUNTER — PRENATAL OFFICE VISIT (OUTPATIENT)
Dept: MIDWIFE SERVICES | Facility: CLINIC | Age: 20
End: 2025-05-05
Payer: COMMERCIAL

## 2025-05-05 VITALS — DIASTOLIC BLOOD PRESSURE: 78 MMHG | BODY MASS INDEX: 40.46 KG/M2 | SYSTOLIC BLOOD PRESSURE: 118 MMHG | WEIGHT: 237 LBS

## 2025-05-05 DIAGNOSIS — O09.93 SUPERVISION OF HIGH RISK PREGNANCY IN THIRD TRIMESTER: Primary | ICD-10-CM

## 2025-05-05 DIAGNOSIS — O35.EXX0 PYELECTASIS OF FETUS ON PRENATAL ULTRASOUND: ICD-10-CM

## 2025-05-05 DIAGNOSIS — O99.213 OBESITY AFFECTING PREGNANCY IN THIRD TRIMESTER, UNSPECIFIED OBESITY TYPE: ICD-10-CM

## 2025-05-05 DIAGNOSIS — Z23 NEED FOR VACCINATION: ICD-10-CM

## 2025-05-05 DIAGNOSIS — Z3A.28 28 WEEKS GESTATION OF PREGNANCY: ICD-10-CM

## 2025-05-05 DIAGNOSIS — O26.893 LOW BACK PAIN DURING PREGNANCY IN THIRD TRIMESTER: ICD-10-CM

## 2025-05-05 DIAGNOSIS — M54.50 LOW BACK PAIN DURING PREGNANCY IN THIRD TRIMESTER: ICD-10-CM

## 2025-05-05 DIAGNOSIS — O35.03X0 CHOROID PLEXUS CYST OF FETUS IN SINGLETON PREGNANCY: ICD-10-CM

## 2025-05-05 PROCEDURE — 99207 PR PRENATAL VISIT: CPT | Performed by: ADVANCED PRACTICE MIDWIFE

## 2025-05-05 PROCEDURE — 3074F SYST BP LT 130 MM HG: CPT | Performed by: ADVANCED PRACTICE MIDWIFE

## 2025-05-05 PROCEDURE — 3078F DIAST BP <80 MM HG: CPT | Performed by: ADVANCED PRACTICE MIDWIFE

## 2025-05-05 PROCEDURE — 90471 IMMUNIZATION ADMIN: CPT | Performed by: ADVANCED PRACTICE MIDWIFE

## 2025-05-05 PROCEDURE — 0502F SUBSEQUENT PRENATAL CARE: CPT | Performed by: ADVANCED PRACTICE MIDWIFE

## 2025-05-05 PROCEDURE — 90715 TDAP VACCINE 7 YRS/> IM: CPT | Performed by: ADVANCED PRACTICE MIDWIFE

## 2025-05-05 NOTE — PROGRESS NOTES
28w3d  Feels well overall. Here alone today. 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   Fetal movement: present  Denies loss of fluid/vb/contractions, signs and symptoms preeclampsia  Tdap: Given  Reviewed PTL precautions, S&S of preeclampsia and fetal movement awareness, patient verbalizes understanding and what to report  RX given for pregnancy support belt, can get from medical supply or pharmacy or purchase OTC, also discussed comfort measures heat, ice, tylenol, baths, and end of pregnancy discomforts  Return to clinic 2 weeks    ADALGISA Horn, JUAN ANTONIOM

## 2025-05-05 NOTE — PATIENT INSTRUCTIONS
Weeks 26 to 30 of Your Pregnancy: Care Instructions  You're starting your last trimester. You'll probably feel your baby moving around more. Your back may ache as your body gets used to your baby's size and length. Take care of yourself, and pay attention to what your body needs.    Talk to your doctor about getting the Tdap shot. It will help protect your  against whooping cough (pertussis). Also ask your doctor about flu and COVID-19 shots if you haven't had them yet. If your blood type is Rh negative, you may be given a shot of Rh immune globulin (such as RhoGAM). It can help prevent problems for your baby.   You may have Mohave-Bhatia contractions. They are single or several strong contractions without a pattern. These are practice contractions but not the start of labor.   Be kind to yourself.       Take breaks when you're tired.  Change positions often. Don't sit for too long or stand for too long.  At work, rest during breaks if you can. If you don't get breaks, talk to your doctor about writing a letter to your employer to request them.  Avoid fumes, chemicals, and tobacco smoke.  Be sexual if you want to.       You may be interested in sex, or you may not. Everyone is different.  Sex is okay unless your doctor tells you not to.  Your belly can make it hard to find good positions for sex. [place] and explore.  Watch for signs of  labor.        These signs include:  Menstrual-like cramps. Or you may have pain or pressure in your pelvis that happens in a pattern.  About 6 or more contractions in an hour (even after rest and a glass of water).  A low, dull backache that doesn't go away when you change positions.  An increase or change in vaginal discharge.  Light vaginal bleeding or spotting.  Your water breaking.  Know what to do if you think you are having contractions.       Drink 1 or 2 glasses of water.  Lie down on your left side for at least an hour.  While on your side, feel the top of  "your belly to see if it's tight.  Write down your contractions for an hour. Time how long it is from the start of one contraction to the start of the next.  Call your doctor if you have regular contractions.  Follow-up care is a key part of your treatment and safety. Be sure to make and go to all appointments, and call your doctor if you are having problems. It's also a good idea to know your test results and keep a list of the medicines you take.  Where can you learn more?  Go to https://www.[employer].net/patiented  Enter S999 in the search box to learn more about \"Weeks 26 to 30 of Your Pregnancy: Care Instructions.\"  Current as of: April 30, 2024  Content Version: 14.4    [id number] [employer].   Care instructions adapted under license by your healthcare professional. If you have questions about a medical condition or this instruction, always ask your healthcare professional. [employer] disclaims any warranty or liability for your use of this information.    Counting Your Baby's Kicks: Care Instructions  Overview     Counting your baby's kicks is one way your doctor can tell that your baby is healthy. You will probably feel your baby move for the first time between 16 and 22 weeks. The movement may feel like flutters rather than kicks. Your baby may move more at certain times of the day. When you are active, you may notice less kicking than when you are resting. At your prenatal visits, your doctor will ask whether the baby is active.  In your last trimester, your doctor may ask you to count the number of times you feel your baby move.  Follow-up care is a key part of your treatment and safety. Be sure to make and go to all appointments, and call your doctor if you are having problems. It's also a good idea to know your test results and keep a list of the medicines you take.  How do you count fetal kicks?  A common method of checking your baby's movement is to note the length of time it " "takes to count 10 movements (such as kicks, flutters, or rolls).  Pick your baby's most active time of day to count. This may be any time from morning to evening.  If you don't feel 10 movements in an hour, have something to eat or drink and count for another hour. If you don't feel at least 10 movements in the 2-hour period, call your doctor.  Do not use an at-home Doppler heart monitor in place of counting fetal movements.  When should you call for help?   Call your doctor now or seek immediate medical care if:    You feel fewer than 10 movements in a 2-hour period.     You noticed that your baby has stopped moving or is moving less than normal.   Watch closely for changes in your health, and be sure to contact your doctor if you have any problems.  Where can you learn more?  Go to https://www.[employer].net/patiented  Enter U048 in the search box to learn more about \"Counting Your Baby's Kicks: Care Instructions.\"  Current as of: April 30, 2024  Content Version: 14.4    [id number] [employer].   Care instructions adapted under license by your healthcare professional. If you have questions about a medical condition or this instruction, always ask your healthcare professional. [employer] disclaims any warranty or liability for your use of this information.    "

## 2025-05-20 ENCOUNTER — PRENATAL OFFICE VISIT (OUTPATIENT)
Dept: MIDWIFE SERVICES | Facility: CLINIC | Age: 20
End: 2025-05-20
Payer: COMMERCIAL

## 2025-05-20 VITALS — DIASTOLIC BLOOD PRESSURE: 76 MMHG | SYSTOLIC BLOOD PRESSURE: 122 MMHG | WEIGHT: 241.6 LBS | BODY MASS INDEX: 41.25 KG/M2

## 2025-05-20 DIAGNOSIS — O09.93 SUPERVISION OF HIGH RISK PREGNANCY IN THIRD TRIMESTER: Primary | ICD-10-CM

## 2025-05-20 DIAGNOSIS — O35.EXX0 PYELECTASIS OF FETUS ON PRENATAL ULTRASOUND: ICD-10-CM

## 2025-05-20 DIAGNOSIS — Z3A.30 30 WEEKS GESTATION OF PREGNANCY: ICD-10-CM

## 2025-05-20 DIAGNOSIS — O99.213 OBESITY AFFECTING PREGNANCY IN THIRD TRIMESTER, UNSPECIFIED OBESITY TYPE: ICD-10-CM

## 2025-05-20 PROCEDURE — 0502F SUBSEQUENT PRENATAL CARE: CPT | Performed by: ADVANCED PRACTICE MIDWIFE

## 2025-05-20 PROCEDURE — 3078F DIAST BP <80 MM HG: CPT | Performed by: ADVANCED PRACTICE MIDWIFE

## 2025-05-20 PROCEDURE — 99207 PR PRENATAL VISIT: CPT | Performed by: ADVANCED PRACTICE MIDWIFE

## 2025-05-20 PROCEDURE — 3074F SYST BP LT 130 MM HG: CPT | Performed by: ADVANCED PRACTICE MIDWIFE

## 2025-05-20 NOTE — PROGRESS NOTES
30w4d   Feels well overall. Here with Ramonita today.   Fetal Movement: present  Denies loss of fluid/vb and s/sx of preeclampsia  Contractions: none    Obesity in pregnancy: BMI 37.9; ASA. Growth at 32w [ ]. BPPs at 37wks  TW lbs.   Fetal right UTDA1: Eval at 32w to determine follow up actions.   Fetal CPC: Declined NIPS    Fetal kick counts reviewed and handout given  Reviewed PTL precautions, s/sx of preeclampsia, fetal movement counts    Return to clinic in 2 weeks    Mary Carmen Morales, DNP, APRN, CNM

## 2025-05-20 NOTE — PATIENT INSTRUCTIONS
"Weeks 30 to 32 of Your Pregnancy: Care Instructions  Your baby is growing more every day. Its eyes can open and close, and it may have hair on its head. Your baby may sleep 20 to 45 minutes at a time and is more active at certain times.    You should feel your baby move several times every day. Your baby now turns less and kicks more.   This is a good time to tour your hospital or birthing center. You may also want to find childcare if needed.         To ease heartburn   Avoid foods that make your symptoms worse, such as chocolate, spicy foods, and caffeine.  Avoid bending over or lying down after meals.  Do not eat for 2 hours before bedtime.  Take antacids like Tums, but don't take ones that have sodium bicarbonate, magnesium trisilicate, or aspirin.        To care for large, swollen veins (varicose veins)   Try to avoid standing for long periods of time.  Sit with your feet propped up.  Wear support hose.  Get some exercise every day, like walking or swimming.  Counting your baby's kicks  Your doctor may ask you to count your baby's movements, such as kicks, flutters, or rolls.    Find a quiet place, and get comfortable. Write down your start time. Count your baby's movements (except hiccups). When your baby has moved 10 times, you can stop counting. Write down how many minutes it took.   If an hour goes by and you don't feel 10 movements, have something to eat or drink. Count for another hour. If you don't feel at least 10 movements in the 2-hour period, call your doctor.   Follow-up care is a key part of your treatment and safety. Be sure to make and go to all appointments, and call your doctor if you are having problems. It's also a good idea to know your test results and keep a list of the medicines you take.  Where can you learn more?  Go to https://www.[employer].net/patiented  Enter X471 in the search box to learn more about \"Weeks 30 to 32 of Your Pregnancy: Care Instructions.\"  Current as of: April 30, " "2024  Content Version: 14.4    9917-0502 [employer].   Care instructions adapted under license by your healthcare professional. If you have questions about a medical condition or this instruction, always ask your healthcare professional. [employer] disclaims any warranty or liability for your use of this information.    Learning About Birth Control After Childbirth  Birth control is any method used to prevent pregnancy. If you have vaginal sex without birth control, you could get pregnant--even if you haven't started having periods again. You're less likely to get pregnant while breastfeeding, but it's still possible. Finding birth control that works for you can help avoid an unplanned pregnancy.  There are many kinds of birth control. Each has pros and cons. Find what works for you. Talk to your doctor if you've just given birth or are breastfeeding.    Long-acting reversible contraception (LARC). These are placed inside your body by a doctor. They can prevent pregnancy for years.  Examples include:  An implant (hormonal).  Copper intrauterine device (IUD).  Hormonal IUDs.    Short-acting hormonal methods. These release hormones. Examples include:  Combination birth control pills (\"the pill\").  Skin patches.  A vaginal ring.  A shot.  Mini-pills. Choose progestin-only options soon after giving birth.    Barrier methods. Use these every time you have vaginal sex.  Examples include:  External (male) condoms.  Internal (female) condoms.  Diaphragms.  Cervical caps.  Sponges.    Spermicides. These kill sperm or stop sperm from moving. They can be gels, creams, foams, films, or tablets. Use them before vaginal sex.  Examples include:  Nonoxynol-9.  pH regulator gel.    Permanent birth control (sterilization). This can be an option if you're sure that you don't want to get pregnant later.  Examples include:  Vasectomy.  Having tubes tied (tubal ligation).    Emergency contraception. This is a " "backup method. Use it if you didn't use birth control or your birth control method failed.  Examples include:  Copper and hormonal IUDs.  Emergency contraceptive pills.    Fertility awareness. You'll learn when you are most likely to become pregnant (are fertile). You can avoid vaginal sex at that time.  It's also called:  Natural family planning.  The rhythm method.    Breastfeeding. This is most effective when all of these are true:  Your baby is younger than 6 months old.  You're breastfeeding and not bottle-feeding at all.  You aren't having periods.  Follow-up care is a key part of your treatment and safety. Be sure to make and go to all appointments, and call your doctor if you are having problems. It's also a good idea to know your test results and keep a list of the medicines you take.  Where can you learn more?  Go to https://www.[employer].net/patiented  Enter X408 in the search box to learn more about \"Learning About Birth Control After Childbirth.\"  Current as of: April 30, 2024  Content Version: 14.4    9072-8121 [employer].   Care instructions adapted under license by your healthcare professional. If you have questions about a medical condition or this instruction, always ask your healthcare professional. [employer] disclaims any warranty or liability for your use of this information.    "

## 2025-05-31 ENCOUNTER — HEALTH MAINTENANCE LETTER (OUTPATIENT)
Age: 20
End: 2025-05-31

## 2025-06-02 NOTE — PROGRESS NOTES
32w4d  Feels well overall. Here with Ramonita today.  Fetal Movement: present   Denies loss of fluid/vb & s/sx of preeclampsia.   Contractions: none    Obesity in pregnancy: BMI 37.9; ASA. Growth at 32w [to be done at [facility] on ]. BPPs at 37wks, standing orders placed today   TW lbs.   Fetal right UTDA1: Eval at 32w to determine follow up actions. Has [facility] appt tomorrow    Fetal CPC: Declined NIPS    Peds chosen: Yes: likely [facility]  Reviewed Hep B, Vit K, and erythromycin   Discussed circumcision; advised to check insurance for hospital vs clinic  Plans to breastfeed Yes  Breastfeeding class planned: receiving resources thru [employer]  and has lactation counselor she is connected with     Fetal kick counts/movements reviewed.  Reviewed danger signs, signs and symptoms PTL, and s/sx of preeclampsia.  Advised to schedule appts out thru [facility] with BPPs starting at 37 weeks.      Return to clinic 2 weeks    ADALGISA Flores, JOSE GUADALUPE

## 2025-06-02 NOTE — PATIENT INSTRUCTIONS
SIGNS OF  LABOR    Labor is  if it happens more than three weeks before your due date.    It can be hard to know if you are in labor, since the symptoms can be like the normal feelings of pregnancy.  Often, the only difference is the symptoms increase or they don't go away.     Signs of  labor can include:    Contractions which can feel like period cramps or gas pain.  You may feel it in the lower part of your abdomen, in your back, or as a pressure feeling in your bottom.  It is often regular, coming every 5 or 10 minutes, and  lasting about 30-60 seconds. Some contractions are normal during pregnancy (Hamblen pelayo contractions) but if you are feeling more than 5-6 in one hour that is NOT normal  If this occurs empty your bladder, then drink 2-3 glasses of water, eat a snack, and lay down on your left side. Put your hand on your abdomen to count the contractions.  If after one hour of resting you have still had 5-6 contractions call your clinic right away.    If you feel a pop, gush, or trickle of fluid it may mean that your bag of water has broken and you should contact the clinic     You may also experience loose stools and/or rectal pressure     Listen to your body, if something doesn't seem right please call us at the clinic    Risk Factors    Previous  delivery  Bacterial Vaginosis- if you notice a fishy smell to your discharge or experience vaginal itching/discomfort you should be evaluated for infection  Smoking  Drug abuse  Adolescent (teen) pregnancy or advanced maternal age (AMA) age 35 and over  Dehydration (this may not cause  labor but it can cause contractions)    If you think you are in  labor we may do some lab testing in the clinic or send you to the hospital for evaluation    Please call us if you are concerned you are in  labor.    [facility]  [phone]        PREECLAMPSIA SIGNS AND SYMPTOMS    Preeclampsia is a dangerous  "condition that some women develop in the second half of pregnancy. It can also begin after the baby is born.  Preeclampsia causes high blood pressure and can cause problems with many organ systems in your body.  It can also affect the growth of your baby. The exact cause of preeclampsia is unknown, however, there are signs and symptoms to watch for:    -A bad headache that doesn't improve with Tylenol  -Visual changes such as spots, flashes of light, blurry vision  -Pain in the upper right part of your abdomen, especially under the ribs that doesn't go away  -Nausea and/or vomiting  -Feeling extremely tired  -Yellowing of the skin and/or eyes  -Feeling \"not quite right\" or that something is wrong  -An extreme amount of swelling (some swelling in pregnancy is very normal)    If your midwife feels that you are developing preeclampsia, you will have lab tests drawn and will be monitored very closely.     If you are experiencing anyof these symptoms, call the [facility] immediately at [phone].      Fetal Kick Counts    It is important to know when your baby's movements occur. We often get busy with work and life and do not pay close attention to their movements.      Women typically begin feeling movement between 18-22 weeks of gestation, sometimes it can be earlier or later depending on where your placenta is     Movements usually begin feeling like popping or fluttering and as the baby grows they become more pronounced    Toward the end of pregnancy as the baby gets larger they may not move as much or make as big of movements. Babies have maturing sleep cycles as well as not as much room to move and flip. If you are ever concerned about your baby's movements or have not felt the baby move for a while, we recommend you do a fetal kick count. Prior to starting your count drink a glass of water or juice and eat a snack. Then lay down on your side and begin to count movements.     How to do a " Fetal Kick Counts    There are many different ways to monitor your baby's movements. Movements can range from large jabs to small kicks, or wiggles.  Hiccups count!    Count 10 movements in 2 hours when resting and focusing  Count 10 movements in 12 hours when doing normal activity    We recommend that if movements occur but seem decreased that you should be seen in the clinic or hospital for evaluation within 12 hours. If fetal movement is absent or fetal kick counts are low please contact us right away.    If you ever have any concerns about your baby's movements DO NOT HESITATE to call us, we are here for you!    [facility]    [phone]

## 2025-06-03 ENCOUNTER — PRENATAL OFFICE VISIT (OUTPATIENT)
Dept: MIDWIFE SERVICES | Facility: CLINIC | Age: 20
End: 2025-06-03
Payer: COMMERCIAL

## 2025-06-03 VITALS — BODY MASS INDEX: 41.32 KG/M2 | DIASTOLIC BLOOD PRESSURE: 70 MMHG | WEIGHT: 242 LBS | SYSTOLIC BLOOD PRESSURE: 104 MMHG

## 2025-06-03 DIAGNOSIS — O35.03X0 CHOROID PLEXUS CYST OF FETUS IN SINGLETON PREGNANCY: ICD-10-CM

## 2025-06-03 DIAGNOSIS — O35.EXX0 PYELECTASIS OF FETUS ON PRENATAL ULTRASOUND: ICD-10-CM

## 2025-06-03 DIAGNOSIS — Z3A.32 32 WEEKS GESTATION OF PREGNANCY: ICD-10-CM

## 2025-06-03 DIAGNOSIS — O09.91 SUPERVISION OF HIGH RISK PREGNANCY IN FIRST TRIMESTER: Primary | ICD-10-CM

## 2025-06-03 DIAGNOSIS — O99.213 OBESITY AFFECTING PREGNANCY IN THIRD TRIMESTER, UNSPECIFIED OBESITY TYPE: ICD-10-CM

## 2025-06-03 PROCEDURE — 3078F DIAST BP <80 MM HG: CPT | Performed by: ADVANCED PRACTICE MIDWIFE

## 2025-06-03 PROCEDURE — 0502F SUBSEQUENT PRENATAL CARE: CPT | Performed by: ADVANCED PRACTICE MIDWIFE

## 2025-06-03 PROCEDURE — 99207 PR PRENATAL VISIT: CPT | Performed by: ADVANCED PRACTICE MIDWIFE

## 2025-06-03 PROCEDURE — 3074F SYST BP LT 130 MM HG: CPT | Performed by: ADVANCED PRACTICE MIDWIFE

## 2025-06-04 ENCOUNTER — OFFICE VISIT (OUTPATIENT)
Dept: MATERNAL FETAL MEDICINE | Facility: CLINIC | Age: 20
End: 2025-06-04
Attending: STUDENT IN AN ORGANIZED HEALTH CARE EDUCATION/TRAINING PROGRAM
Payer: COMMERCIAL

## 2025-06-04 ENCOUNTER — HOSPITAL ENCOUNTER (OUTPATIENT)
Dept: ULTRASOUND IMAGING | Facility: CLINIC | Age: 20
Discharge: HOME | End: 2025-06-04
Attending: STUDENT IN AN ORGANIZED HEALTH CARE EDUCATION/TRAINING PROGRAM
Payer: COMMERCIAL

## 2025-06-04 DIAGNOSIS — O35.EXX0 PYELECTASIS OF FETUS ON PRENATAL ULTRASOUND: Primary | ICD-10-CM

## 2025-06-04 DIAGNOSIS — O99.210 OBESITY IN PREGNANCY, ANTEPARTUM: ICD-10-CM

## 2025-06-04 DIAGNOSIS — O35.EXX0 PYELECTASIS OF FETUS ON PRENATAL ULTRASOUND: ICD-10-CM

## 2025-06-04 PROCEDURE — 76816 OB US FOLLOW-UP PER FETUS: CPT

## 2025-06-04 NOTE — PROGRESS NOTES
"Please see \"Imaging\" tab under \"Chart Review\" for details of today's visit.    Apple Parham    "

## 2025-06-04 NOTE — NURSING NOTE
Madelin Spain is a  at 32w5d who presents to [facility] for RL2. Pt reports positive fetal movement. SBAR given to Dr. CHARLOTTE Parham, see note in Epic.

## 2025-06-16 NOTE — PROGRESS NOTES
34w5d    Feels well overall. Here alone today. Discussed BH contractions and mega horses at night.     Fetal Movement: present   Denies loss of fluid/vb & s/sx of preeclampsia.   Contractions: some BH that resolve with rest    Have car seat: Yes  Discussed GBS screen at next visit  Breast pump - will  at hospital    Fetal kick counts/movements reviewed  Reviewed signs and symptoms PTL and s/sx of preeclampsia; denies any S&S and aware of what to report  FH > dates today; measuring 37 cm. Last growth US at [facility] 69%ile; plan to repeat FH next visit and determine need for additional growth at that time.    Completed CBE? No; has a nurse that visits and breastfeeding counselor  Birth preferences hoping to go unmedicated    Return to clinic 2 weeks    ADALGISA Michael, CNM    Obesity in pregnancy: BMI 37.9; ASA. Growth at 32w per MFM 68%ile. BPPs at 37wks, standing orders placed today   TW lbs.   Fetal right UTDA1: resolved at 32 weeks  Fetal CPC: Declined NIPS  Tdap:  2025

## 2025-06-16 NOTE — PATIENT INSTRUCTIONS
Weeks 34 to 36 of Your Pregnancy: Care Instructions  Your belly has grown quite large. It's almost time to give birth! Your baby's lungs are almost ready to breathe air. The skull bones are firm enough to protect your baby's head. But they're soft enough to move down through the birth canal.    You might be wondering what to expect during labor. Because each birth is different, there's no way to know exactly what childbirth will be like for you. Talk to your doctor or midwife about any concerns you have.   You'll probably have a test for group B streptococcus (GBS). GBS is bacteria that can live in the vagina and rectum. GBS can make your baby sick after birth. If you test positive, you'll get antibiotics during labor.     Choose what type of pain relief you would like during labor.  You can choose from a few types, including medicine and non-medicine options. You may want to use several types of pain relief.     Know how medicines can help with pain during labor.  Some medicines lower anxiety and help with some of the pain. Others make your lower body numb so that you will feel less pain.     Tell your doctor about your pain medicine choice.  Do this before you start labor or very early in your labor. You may be able to change your mind during labor.     Learn about the stages of labor.    The first stage includes the early (latent) and active phases of labor. Contractions start in early labor. During active labor, contractions get stronger, last longer, and happen more often. And the cervix opens more rapidly.  The second stage starts when you're ready to push. During this stage, your baby is born.  During the third stage, you'll usually have a few more contractions to push out the placenta.   Follow-up care is a key part of your treatment and safety. Be sure to make and go to all appointments, and call your doctor if you are having problems. It's also a good idea to know your test results and keep a list of the  "medicines you take.  Where can you learn more?  Go to https://www.my4oneone.net/patiented  Enter B912 in the search box to learn more about \"Weeks 34 to 36 of Your Pregnancy: Care Instructions.\"  Current as of: 2024  Content Version: . [employer].   Care instructions adapted under license by your healthcare professional. If you have questions about a medical condition or this instruction, always ask your healthcare professional. [employer] disclaims any warranty or liability for your use of this information.    Group B Strep During Pregnancy: Care Instructions  Overview     Group B strep infection is caused by a type of bacteria. It's a different kind of bacteria than the kind that causes strep throat.  You may have this kind of bacteria in your body. Sometimes it may cause an infection, but most of the time it doesn't make you sick or cause symptoms. But if you pass the bacteria to your baby during the birth, it can cause serious health problems for your baby.  If you have this bacteria in your body, you will get antibiotics when you are in labor. Antibiotics help prevent problems for a  baby.  After birth, doctors will watch and may test your baby. If your baby tests positive for Group B strep, your baby will get antibiotics.  If you plan to breastfeed your baby, don't worry. It will be safe to breastfeed.  Follow-up care is a key part of your treatment and safety. Be sure to make and go to all appointments, and call your doctor if you are having problems. It's also a good idea to know your test results and keep a list of the medicines you take.  How can you care for yourself at home?  If your doctor has prescribed antibiotics, take them as directed. Do not stop taking them just because you feel better. You need to take the full course of antibiotics.  Tell your doctor if you are allergic to any antibiotic.  If you go into labor, or your water breaks, go to " "the hospital. Your doctor will give you antibiotics to help protect your baby from infection.  Tell the doctors and nurses if you have an allergy to penicillin.  Tell the doctors and nurses at the hospital that you tested positive for group B strep.  When should you call for help?   Call your doctor now or seek immediate medical care if:    You have symptoms of a urinary tract infection. These may include:  Pain or burning when you urinate.  A frequent need to urinate without being able to pass much urine.  Pain in the flank, which is just below the rib cage and above the waist on either side of the back.  Blood in your urine.  A fever.     You think you are in labor or your water has broken.     You have pain in your belly or pelvis.   Watch closely for changes in your health, and be sure to contact your doctor if you have any problems.  Where can you learn more?  Go to https://www.[employer]/patiented  Enter M001 in the search box to learn more about \"Group B Strep During Pregnancy: Care Instructions.\"  Current as of: April 30, 2024  Content Version: 14.5    7268-5823 [email or web address].   Care instructions adapted under license by your healthcare professional. If you have questions about a medical condition or this instruction, always ask your healthcare professional. [email or web address] disclaims any warranty or liability for your use of this information.    Circumcision in Infants: What to Expect at Home  Your Child's Recovery  After circumcision, your baby's penis may look red and swollen. It may have petroleum jelly and gauze on it. The gauze will likely come off when your baby urinates. Follow your doctor's directions about whether to put clean gauze back on your baby's penis or to leave the gauze off. If you need to remove gauze from the penis, use warm water to soak the gauze and gently loosen it.  The doctor may have used a Plastibell device to do the circumcision. If so, your baby will have a " plastic ring around the head of the penis. The ring should fall off by itself in 10 to 12 days.  A thin, yellow film may form over the area the day after the procedure. This is part of the normal healing process. It should go away in a few days.  Your baby may seem fussy while the area heals. It may hurt for your baby to urinate. This pain often gets better in 3 or 4 days. But it may last for up to 2 weeks.  Even though your baby's penis will likely start to feel better after 3 or 4 days, it may look worse. The penis often starts to look like it's getting better after about 7 to 10 days.  This care sheet gives you a general idea about how long it will take for your child to recover. But each child recovers at a different pace. Follow the steps below to help your child get better as quickly as possible.  How can you care for your child at home?  Activity    Let your baby rest as much as possible. Sleeping will help with recovery.     You can give your baby a sponge bath the day after surgery. Ask your doctor when it is okay to give your baby a bath.   Medicines    Your doctor will tell you if and when your child can restart any medicines. The doctor will also give you instructions about your child taking any new medicines.     Your doctor may recommend giving your baby acetaminophen (Tylenol) to help with pain after the procedure. Be safe with medicines. Give your child medicines exactly as prescribed. Call your doctor if you think your child is having a problem with a medicine.     Do not give your child two or more pain medicines at the same time unless the doctor told you to. Many pain medicines have acetaminophen, which is Tylenol. Too much acetaminophen (Tylenol) can be harmful.   Circumcision care    Always wash your hands before and after touching the circumcision area.     Gently wash your baby's penis with plain, warm water after each diaper change, and pat it dry. Do not use soap. Don't use hydrogen  "peroxide or alcohol. They can slow healing.     Do not try to remove the film that forms on the penis. The film will go away on its own.     Put plenty of petroleum jelly (such as Vaseline) on the circumcision area during each diaper change. This will prevent your baby's penis from sticking to the diaper while it heals.     Fasten your baby's diapers loosely so that there is less pressure on the penis while it heals.   Follow-up care is a key part of your child's treatment and safety. Be sure to make and go to all appointments, and call your doctor if your child is having problems. It's also a good idea to know your child's test results and keep a list of the medicines your child takes.  When should you call for help?   Call your doctor now or seek immediate medical care if:    Your baby has a fever over 100.4 F.     Your baby is extremely fussy or irritable, has a high-pitched cry, or refuses to eat.     Your baby does not have a wet diaper within 12 hours after the circumcision.     You find a spot of bleeding larger than a 2-inch [place] from the incision.     Your baby has signs of infection. Signs may include severe swelling; redness; a red streak on the shaft of the penis; or a thick, yellow discharge.   Watch closely for changes in your child's health, and be sure to contact your doctor if:    A Plastibell device was used for the circumcision and the ring has not fallen off after 10 to 12 days.   Where can you learn more?  Go to https://www.[employer].net/patiented  Enter S255 in the search box to learn more about \"Circumcision in Infants: What to Expect at Home.\"  Current as of: October 24, 2024  Content Version: 14.5    0227-9795 [employer].   Care instructions adapted under license by your healthcare professional. If you have questions about a medical condition or this instruction, always ask your healthcare professional. [employer] disclaims any warranty or liability for your use of " this information.    Fetal Kick Counts    It is important to know when your baby's movements occur. We often get busy with work and life and do not pay close attention to their movements.      Women typically begin feeling movement between 18-22 weeks of gestation, sometimes it can be earlier or later depending on where your placenta is     Movements usually begin feeling like popping or fluttering and as the baby grows they become more pronounced    Toward the end of pregnancy as the baby gets larger they may not move as much or make as big of movements. Babies have maturing sleep cycles as well as not as much room to move and flip. If you are ever concerned about your baby's movements or have not felt the baby move for a while, we recommend you do a fetal kick count. Prior to starting your count drink a glass of water or juice and eat a snack. Then lay down on your side and begin to count movements.     How to do a Fetal Kick Counts    There are many different ways to monitor your baby's movements. Movements can range from large jabs to small kicks, or wiggles.  Hiccups count!    Count 10 movements in 2 hours when resting and focusing  Count 10 movements in 12 hours when doing normal activity    We recommend that if movements occur but seem decreased that you should be seen in the clinic or hospital for evaluation within 12 hours. If fetal movement is absent or fetal kick counts are low please contact us right away.    If you ever have any concerns about your baby's movements DO NOT HESITATE to call us, we are here for you!    [facility]    [phone]       PREECLAMPSIA SIGNS AND SYMPTOMS    Preeclampsia is a dangerous condition that some women develop in the second half of pregnancy. It can also begin after the baby is born.  Preeclampsia causes high blood pressure and can cause problems with many organ systems in your body.  It can also affect the growth of your baby. The exact cause of  "preeclampsia is unknown, however, there are signs and symptoms to watch for:    -A bad headache that doesn't improve with Tylenol  -Visual changes such as spots, flashes of light, blurry vision  -Pain in the upper right part of your abdomen, especially under the ribs that doesn't go away  -Nausea and/or vomiting  -Feeling extremely tired  -Yellowing of the skin and/or eyes  -Feeling \"not quite right\" or that something is wrong  -An extreme amount of swelling (some swelling in pregnancy is very normal)    If your midwife feels that you are developing preeclampsia, you will have lab tests drawn and will be monitored very closely.     If you are experiencing anyof these symptoms, call the [facility] immediately at [phone].     SIGNS OF  LABOR    Labor is  if it happens more than three weeks before your due date.    It can be hard to know if you are in labor, since the symptoms can be like the normal feelings of pregnancy.  Often, the only difference is the symptoms increase or they don't go away.     Signs of  labor can include:    Contractions which can feel like period cramps or gas pain.  You may feel it in the lower part of your abdomen, in your back, or as a pressure feeling in your bottom.  It is often regular, coming every 5 or 10 minutes, and  lasting about 30-60 seconds. Some contractions are normal during pregnancy ([place]o contractions) but if you are feeling more than 5-6 in one hour that is NOT normal  If this occurs empty your bladder, then drink 2-3 glasses of water, eat a snack, and lay down on your left side. Put your hand on your abdomen to count the contractions.  If after one hour of resting you have still had 5-6 contractions call your clinic right away.    If you feel a pop, gush, or trickle of fluid it may mean that your bag of water has broken and you should contact the clinic     You may also experience loose stools and/or rectal pressure "     Listen to your body, if something doesn't seem right please call us at the clinic    Risk Factors    Previous  delivery  Bacterial Vaginosis- if you notice a fishy smell to your discharge or experience vaginal itching/discomfort you should be evaluated for infection  Smoking  Drug abuse  Adolescent (teen) pregnancy or advanced maternal age (AMA) age 35 and over  Dehydration (this may not cause  labor but it can cause contractions)    If you think you are in  labor we may do some lab testing in the clinic or send you to the hospital for evaluation    Please call us if you are concerned you are in  labor.    [employer] [facility]  [phone]

## 2025-06-18 ENCOUNTER — PRENATAL OFFICE VISIT (OUTPATIENT)
Dept: MIDWIFE SERVICES | Facility: CLINIC | Age: 20
End: 2025-06-18
Payer: COMMERCIAL

## 2025-06-18 VITALS — WEIGHT: 245.8 LBS | SYSTOLIC BLOOD PRESSURE: 112 MMHG | BODY MASS INDEX: 41.96 KG/M2 | DIASTOLIC BLOOD PRESSURE: 68 MMHG

## 2025-06-18 DIAGNOSIS — Z3A.34 34 WEEKS GESTATION OF PREGNANCY: ICD-10-CM

## 2025-06-18 DIAGNOSIS — O35.EXX0 PYELECTASIS OF FETUS ON PRENATAL ULTRASOUND: ICD-10-CM

## 2025-06-18 DIAGNOSIS — O09.91 SUPERVISION OF HIGH RISK PREGNANCY IN FIRST TRIMESTER: Primary | ICD-10-CM

## 2025-06-18 PROCEDURE — 99207 PR PRENATAL VISIT: CPT

## 2025-06-18 PROCEDURE — 3074F SYST BP LT 130 MM HG: CPT

## 2025-06-18 PROCEDURE — 0502F SUBSEQUENT PRENATAL CARE: CPT

## 2025-06-18 PROCEDURE — 3078F DIAST BP <80 MM HG: CPT

## 2025-06-30 ENCOUNTER — PRENATAL OFFICE VISIT (OUTPATIENT)
Dept: MIDWIFE SERVICES | Facility: CLINIC | Age: 20
End: 2025-06-30
Payer: COMMERCIAL

## 2025-06-30 VITALS
DIASTOLIC BLOOD PRESSURE: 62 MMHG | WEIGHT: 248.8 LBS | BODY MASS INDEX: 42.47 KG/M2 | HEIGHT: 64 IN | SYSTOLIC BLOOD PRESSURE: 100 MMHG

## 2025-06-30 DIAGNOSIS — O09.91 SUPERVISION OF HIGH RISK PREGNANCY IN FIRST TRIMESTER: Primary | ICD-10-CM

## 2025-06-30 DIAGNOSIS — Z36.85 ANTENATAL SCREENING FOR STREPTOCOCCUS B: ICD-10-CM

## 2025-06-30 DIAGNOSIS — O09.90 SUPERVISION OF HIGH RISK PREGNANCY, ANTEPARTUM: ICD-10-CM

## 2025-06-30 PROCEDURE — 0502F SUBSEQUENT PRENATAL CARE: CPT | Performed by: ADVANCED PRACTICE MIDWIFE

## 2025-06-30 PROCEDURE — 3074F SYST BP LT 130 MM HG: CPT | Performed by: ADVANCED PRACTICE MIDWIFE

## 2025-06-30 PROCEDURE — 99207 PR PRENATAL VISIT: CPT | Performed by: ADVANCED PRACTICE MIDWIFE

## 2025-06-30 PROCEDURE — 3078F DIAST BP <80 MM HG: CPT | Performed by: ADVANCED PRACTICE MIDWIFE

## 2025-06-30 NOTE — PROGRESS NOTES
36w3d  Feels good overall. Here with mom today. Complains of sharp left sided pain with working today, some BH contractions nothing new. Constipation.   Fetal Movement: present   Denies loss of fluid/vb & s/sx of preeclampsia.   Contractions: yes, relief with activities, rest, and hydration.   Cramping at left side with activities and relief with rest.     GBS swab was not done today: patient refer to be done next ob visit.     Labor preferences/birth plan reviewed: yes   Breast pump Rx no  CBC drawn today. no    Labor instructions given  Reviewed fetal kick/movement counts  Warning signs and signs and symptoms preeclampsia reviewed  Magnesium for constipation, ice for likely musculoskeletal pain.    Return to clinic 1 week    ADALGISA Fuentes CNM

## 2025-06-30 NOTE — PATIENT INSTRUCTIONS
Weeks 34 to 36 of Your Pregnancy: Care Instructions  Your belly has grown quite large. It's almost time to give birth! Your baby's lungs are almost ready to breathe air. The skull bones are firm enough to protect your baby's head. But they're soft enough to move down through the birth canal.    You might be wondering what to expect during labor. Because each birth is different, there's no way to know exactly what childbirth will be like for you. Talk to your doctor or midwife about any concerns you have.   You'll probably have a test for group B streptococcus (GBS). GBS is bacteria that can live in the vagina and rectum. GBS can make your baby sick after birth. If you test positive, you'll get antibiotics during labor.     Choose what type of pain relief you would like during labor.  You can choose from a few types, including medicine and non-medicine options. You may want to use several types of pain relief.     Know how medicines can help with pain during labor.  Some medicines lower anxiety and help with some of the pain. Others make your lower body numb so that you will feel less pain.     Tell your doctor about your pain medicine choice.  Do this before you start labor or very early in your labor. You may be able to change your mind during labor.     Learn about the stages of labor.    The first stage includes the early (latent) and active phases of labor. Contractions start in early labor. During active labor, contractions get stronger, last longer, and happen more often. And the cervix opens more rapidly.  The second stage starts when you're ready to push. During this stage, your baby is born.  During the third stage, you'll usually have a few more contractions to push out the placenta.   Follow-up care is a key part of your treatment and safety. Be sure to make and go to all appointments, and call your doctor if you are having problems. It's also a good idea to know your test results and keep a list of the  "medicines you take.  Where can you learn more?  Go to https://www.[employer].net/patiented  Enter B912 in the search box to learn more about \"Weeks 34 to 36 of Your Pregnancy: Care Instructions.\"  Current as of: 2024  Content Version: . [employer].   Care instructions adapted under license by your healthcare professional. If you have questions about a medical condition or this instruction, always ask your healthcare professional. [employer] disclaims any warranty or liability for your use of this information.    Group B Strep During Pregnancy: Care Instructions  Overview     Group B strep infection is caused by a type of bacteria. It's a different kind of bacteria than the kind that causes strep throat.  You may have this kind of bacteria in your body. Sometimes it may cause an infection, but most of the time it doesn't make you sick or cause symptoms. But if you pass the bacteria to your baby during the birth, it can cause serious health problems for your baby.  If you have this bacteria in your body, you will get antibiotics when you are in labor. Antibiotics help prevent problems for a  baby.  After birth, doctors will watch and may test your baby. If your baby tests positive for Group B strep, your baby will get antibiotics.  If you plan to breastfeed your baby, don't worry. It will be safe to breastfeed.  Follow-up care is a key part of your treatment and safety. Be sure to make and go to all appointments, and call your doctor if you are having problems. It's also a good idea to know your test results and keep a list of the medicines you take.  How can you care for yourself at home?  If your doctor has prescribed antibiotics, take them as directed. Do not stop taking them just because you feel better. You need to take the full course of antibiotics.  Tell your doctor if you are allergic to any antibiotic.  If you go into labor, or your water breaks, go to " "the hospital. Your doctor will give you antibiotics to help protect your baby from infection.  Tell the doctors and nurses if you have an allergy to penicillin.  Tell the doctors and nurses at the hospital that you tested positive for group B strep.  When should you call for help?   Call your doctor now or seek immediate medical care if:    You have symptoms of a urinary tract infection. These may include:  Pain or burning when you urinate.  A frequent need to urinate without being able to pass much urine.  Pain in the flank, which is just below the rib cage and above the waist on either side of the back.  Blood in your urine.  A fever.     You think you are in labor or your water has broken.     You have pain in your belly or pelvis.   Watch closely for changes in your health, and be sure to contact your doctor if you have any problems.  Where can you learn more?  Go to https://www.[employer]/patiented  Enter M001 in the search box to learn more about \"Group B Strep During Pregnancy: Care Instructions.\"  Current as of: April 30, 2024  Content Version: 14.5    0027-1830 [employer].   Care instructions adapted under license by your healthcare professional. If you have questions about a medical condition or this instruction, always ask your healthcare professional. [employer] disclaims any warranty or liability for your use of this information.    Circumcision in Infants: What to Expect at Home  Your Child's Recovery  After circumcision, your baby's penis may look red and swollen. It may have petroleum jelly and gauze on it. The gauze will likely come off when your baby urinates. Follow your doctor's directions about whether to put clean gauze back on your baby's penis or to leave the gauze off. If you need to remove gauze from the penis, use warm water to soak the gauze and gently loosen it.  The doctor may have used a Plastibell device to do the circumcision. If so, your baby will have a " plastic ring around the head of the penis. The ring should fall off by itself in 10 to 12 days.  A thin, yellow film may form over the area the day after the procedure. This is part of the normal healing process. It should go away in a few days.  Your baby may seem fussy while the area heals. It may hurt for your baby to urinate. This pain often gets better in 3 or 4 days. But it may last for up to 2 weeks.  Even though your baby's penis will likely start to feel better after 3 or 4 days, it may look worse. The penis often starts to look like it's getting better after about 7 to 10 days.  This care sheet gives you a general idea about how long it will take for your child to recover. But each child recovers at a different pace. Follow the steps below to help your child get better as quickly as possible.  How can you care for your child at home?  Activity    Let your baby rest as much as possible. Sleeping will help with recovery.     You can give your baby a sponge bath the day after surgery. Ask your doctor when it is okay to give your baby a bath.   Medicines    Your doctor will tell you if and when your child can restart any medicines. The doctor will also give you instructions about your child taking any new medicines.     Your doctor may recommend giving your baby acetaminophen (Tylenol) to help with pain after the procedure. Be safe with medicines. Give your child medicines exactly as prescribed. Call your doctor if you think your child is having a problem with a medicine.     Do not give your child two or more pain medicines at the same time unless the doctor told you to. Many pain medicines have acetaminophen, which is Tylenol. Too much acetaminophen (Tylenol) can be harmful.   Circumcision care    Always wash your hands before and after touching the circumcision area.     Gently wash your baby's penis with plain, warm water after each diaper change, and pat it dry. Do not use soap. Don't use hydrogen  "peroxide or alcohol. They can slow healing.     Do not try to remove the film that forms on the penis. The film will go away on its own.     Put plenty of petroleum jelly (such as Vaseline) on the circumcision area during each diaper change. This will prevent your baby's penis from sticking to the diaper while it heals.     Fasten your baby's diapers loosely so that there is less pressure on the penis while it heals.   Follow-up care is a key part of your child's treatment and safety. Be sure to make and go to all appointments, and call your doctor if your child is having problems. It's also a good idea to know your child's test results and keep a list of the medicines your child takes.  When should you call for help?   Call your doctor now or seek immediate medical care if:    Your baby has a fever over 100.4 F.     Your baby is extremely fussy or irritable, has a high-pitched cry, or refuses to eat.     Your baby does not have a wet diaper within 12 hours after the circumcision.     You find a spot of bleeding larger than a 2-inch [place] from the incision.     Your baby has signs of infection. Signs may include severe swelling; redness; a red streak on the shaft of the penis; or a thick, yellow discharge.   Watch closely for changes in your child's health, and be sure to contact your doctor if:    A Plastibell device was used for the circumcision and the ring has not fallen off after 10 to 12 days.   Where can you learn more?  Go to https://www.[employer].net/patiented  Enter S255 in the search box to learn more about \"Circumcision in Infants: What to Expect at Home.\"  Current as of: October 24, 2024  Content Version: 14.5    4602-1217 [employer].   Care instructions adapted under license by your healthcare professional. If you have questions about a medical condition or this instruction, always ask your healthcare professional. [employer] disclaims any warranty or liability for your use of " this information.    Week 37 of Your Pregnancy: Care Instructions    Most babies are born between 37 and 40 weeks.   This is a good time to pack a bag to take with you to the birth. Then it will be ready to go when you are.   7 tips for week 37 of pregnancy   Learn about breastfeeding. For example, find out about ways to hold your baby to make breastfeeding easier. And think about learning how to pump and store milk.    Know that crying is normal. It's common for babies to cry 1 to 3 hours a day. Some cry more, and some cry less.    Learn why babies cry. They may be hungry; have gas; need a diaper change; or feel cold, warm, tired, lonely, or tense. Sometimes they cry for unknown reasons.    Think about what will help you stay calm when your baby cries. Taking slow, deep breaths can help. So can taking a break. It's okay to put your baby somewhere safe (like their crib) and walk away for a few minutes.    Learn about safe sleep for your baby. Always put your baby to sleep on their back. Place them alone in a crib or bassinet with a firm, flat surface. Keep soft items like stuffed animals out of the crib.    Learn what to expect with  poop. Your baby will have their own bowel patterns. Some babies have several bowel movements a day. Some have fewer.    Know that  babies will often have loose, yellow bowel movements. Formula-fed babies have more formed stools. If your baby's poop looks like pellets, your baby is constipated.   Follow-up care is a key part of your treatment and safety. Be sure to make and go to all appointments, and call your doctor if you are having problems. It's also a good idea to know your test results and keep a list of the medicines you take.  When should you call for help?  Call 911 anytime you think you may need emergency care. For example, call if:  You have severe vaginal bleeding. You have soaked through one or more pads in an hour, and the bleeding is not slowing down.  You  have sudden, severe pain in your belly that does not go away.  You have chest pain, are short of breath, or cough up blood.  You passed out (lost consciousness).  You have a seizure.  You see or feel the umbilical cord.  You think you are about to deliver your baby and can't make it safely to the hospital or birthing center.  Call your doctor now or seek immediate medical care if:  You have vaginal bleeding.  You have belly pain.  You have a fever.  You are dizzy or lightheaded, or you feel like you may faint.  You have signs of a blood clot in your leg (called a deep vein thrombosis), such as:  Pain in the calf, back of the knee, thigh, or groin.  Swelling in your leg or groin.  A color change on the leg or groin. The skin may be reddish or purplish.  You have symptoms of preeclampsia, such as:  Sudden swelling of your face, hands, or feet.  New vision problems (such as dimness, blurring, or seeing spots).  A severe headache that will not go away.  You have a sudden release or slow trickle of fluid from your vagina. This may mean your water has broken.  You notice that your baby has stopped moving or is moving less than normal.  You have signs of heart failure, such as:  New or increased shortness of breath.  New or worse swelling in your legs, ankles, or feet.  Sudden weight gain, such as more than 2 to 3 pounds in a day or 5 pounds in a week.  Feeling so tired or weak that you cannot do your usual activities.  You have symptoms of a urinary tract infection. These may include:  Pain or burning when you urinate.  A frequent need to urinate without being able to pass much urine.  Pain in your low back (below the rib cage and above the waist).  Blood in your urine.  You have signs of active labor. During active labor, contractions:  Happen more often and at regular intervals (about every 3 to 5 minutes).  Last longer (about 60 seconds or more).  Get stronger and are hard to talk through.  Watch closely for changes in  "your health, and be sure to contact your doctor if:  You have vaginal discharge that smells bad.  You feel sad, anxious, or hopeless for more than a few days.  You have skin changes, such as a rash, itching, or a yellow color to your skin.  You have other concerns about your pregnancy.  Where can you learn more?  Go to https://www.[employer].net/patiented  Enter N257 in the search box to learn more about \"Week 37 of Your Pregnancy: Care Instructions.\"  Current as of: April 30, 2024  Content Version: 14.5    4101-5427 [employer].   Care instructions adapted under license by your healthcare professional. If you have questions about a medical condition or this instruction, always ask your healthcare professional. [employer] disclaims any warranty or liability for your use of this information.    "

## 2025-07-01 PROBLEM — Z3A.36 36 WEEKS GESTATION OF PREGNANCY: Status: ACTIVE | Noted: 2025-07-01

## 2025-07-01 NOTE — PROGRESS NOTES
36w5d  Feels well overall. Here with Ramonita today.  Fetal Movement: present   Denies loss of fluid/vb & s/sx of preeclampsia.   Contractions: none - maybe some BH    GBS swab done today  Cephalic on BSUS  Attempted VE; unable to pass introitus due to discomfort  FH high for dates today    Labor preferences/birth plan reviewed: hoping to go unmedicated, open to meds PRN   Breast pump Rx - will get from hospital  CBC drawn today.     Labor instructions given  Reviewed fetal kick/movement counts  Warning signs and signs and symptoms preeclampsia reviewed    Return to clinic 1 week    Meme Joe, APRN, CNM    Obesity in pregnancy: BMI 37.9; ASA. Growth at 32w per MFM 68%ile. BPPs at 37wks  TW lbs.   Fetal right UTDA1: resolved at 32 weeks  Fetal CPC: Declined NIPS  Tdap:  2025

## 2025-07-01 NOTE — PATIENT INSTRUCTIONS
Weeks 34 to 36 of Your Pregnancy: Care Instructions  Your belly has grown quite large. It's almost time to give birth! Your baby's lungs are almost ready to breathe air. The skull bones are firm enough to protect your baby's head. But they're soft enough to move down through the birth canal.    You might be wondering what to expect during labor. Because each birth is different, there's no way to know exactly what childbirth will be like for you. Talk to your doctor or midwife about any concerns you have.   You'll probably have a test for group B streptococcus (GBS). GBS is bacteria that can live in the vagina and rectum. GBS can make your baby sick after birth. If you test positive, you'll get antibiotics during labor.     Choose what type of pain relief you would like during labor.  You can choose from a few types, including medicine and non-medicine options. You may want to use several types of pain relief.     Know how medicines can help with pain during labor.  Some medicines lower anxiety and help with some of the pain. Others make your lower body numb so that you will feel less pain.     Tell your doctor about your pain medicine choice.  Do this before you start labor or very early in your labor. You may be able to change your mind during labor.     Learn about the stages of labor.    The first stage includes the early (latent) and active phases of labor. Contractions start in early labor. During active labor, contractions get stronger, last longer, and happen more often. And the cervix opens more rapidly.  The second stage starts when you're ready to push. During this stage, your baby is born.  During the third stage, you'll usually have a few more contractions to push out the placenta.   Follow-up care is a key part of your treatment and safety. Be sure to make and go to all appointments, and call your doctor if you are having problems. It's also a good idea to know your test results and keep a list of the  "medicines you take.  Where can you learn more?  Go to https://www.PPTV.net/patiented  Enter B912 in the search box to learn more about \"Weeks 34 to 36 of Your Pregnancy: Care Instructions.\"  Current as of: 2024  Content Version: . [employer].   Care instructions adapted under license by your healthcare professional. If you have questions about a medical condition or this instruction, always ask your healthcare professional. [employer] disclaims any warranty or liability for your use of this information.    Group B Strep During Pregnancy: Care Instructions  Overview     Group B strep infection is caused by a type of bacteria. It's a different kind of bacteria than the kind that causes strep throat.  You may have this kind of bacteria in your body. Sometimes it may cause an infection, but most of the time it doesn't make you sick or cause symptoms. But if you pass the bacteria to your baby during the birth, it can cause serious health problems for your baby.  If you have this bacteria in your body, you will get antibiotics when you are in labor. Antibiotics help prevent problems for a  baby.  After birth, doctors will watch and may test your baby. If your baby tests positive for Group B strep, your baby will get antibiotics.  If you plan to breastfeed your baby, don't worry. It will be safe to breastfeed.  Follow-up care is a key part of your treatment and safety. Be sure to make and go to all appointments, and call your doctor if you are having problems. It's also a good idea to know your test results and keep a list of the medicines you take.  How can you care for yourself at home?  If your doctor has prescribed antibiotics, take them as directed. Do not stop taking them just because you feel better. You need to take the full course of antibiotics.  Tell your doctor if you are allergic to any antibiotic.  If you go into labor, or your water breaks, go to " "the hospital. Your doctor will give you antibiotics to help protect your baby from infection.  Tell the doctors and nurses if you have an allergy to penicillin.  Tell the doctors and nurses at the hospital that you tested positive for group B strep.  When should you call for help?   Call your doctor now or seek immediate medical care if:    You have symptoms of a urinary tract infection. These may include:  Pain or burning when you urinate.  A frequent need to urinate without being able to pass much urine.  Pain in the flank, which is just below the rib cage and above the waist on either side of the back.  Blood in your urine.  A fever.     You think you are in labor or your water has broken.     You have pain in your belly or pelvis.   Watch closely for changes in your health, and be sure to contact your doctor if you have any problems.  Where can you learn more?  Go to https://www.[employer]/patiented  Enter M001 in the search box to learn more about \"Group B Strep During Pregnancy: Care Instructions.\"  Current as of: April 30, 2024  Content Version: 14.5    4628-5927 [employer].   Care instructions adapted under license by your healthcare professional. If you have questions about a medical condition or this instruction, always ask your healthcare professional. [employer] disclaims any warranty or liability for your use of this information.    Circumcision in Infants: What to Expect at Home  Your Child's Recovery  After circumcision, your baby's penis may look red and swollen. It may have petroleum jelly and gauze on it. The gauze will likely come off when your baby urinates. Follow your doctor's directions about whether to put clean gauze back on your baby's penis or to leave the gauze off. If you need to remove gauze from the penis, use warm water to soak the gauze and gently loosen it.  The doctor may have used a Plastibell device to do the circumcision. If so, your baby will have a " plastic ring around the head of the penis. The ring should fall off by itself in 10 to 12 days.  A thin, yellow film may form over the area the day after the procedure. This is part of the normal healing process. It should go away in a few days.  Your baby may seem fussy while the area heals. It may hurt for your baby to urinate. This pain often gets better in 3 or 4 days. But it may last for up to 2 weeks.  Even though your baby's penis will likely start to feel better after 3 or 4 days, it may look worse. The penis often starts to look like it's getting better after about 7 to 10 days.  This care sheet gives you a general idea about how long it will take for your child to recover. But each child recovers at a different pace. Follow the steps below to help your child get better as quickly as possible.  How can you care for your child at home?  Activity    Let your baby rest as much as possible. Sleeping will help with recovery.     You can give your baby a sponge bath the day after surgery. Ask your doctor when it is okay to give your baby a bath.   Medicines    Your doctor will tell you if and when your child can restart any medicines. The doctor will also give you instructions about your child taking any new medicines.     Your doctor may recommend giving your baby acetaminophen (Tylenol) to help with pain after the procedure. Be safe with medicines. Give your child medicines exactly as prescribed. Call your doctor if you think your child is having a problem with a medicine.     Do not give your child two or more pain medicines at the same time unless the doctor told you to. Many pain medicines have acetaminophen, which is Tylenol. Too much acetaminophen (Tylenol) can be harmful.   Circumcision care    Always wash your hands before and after touching the circumcision area.     Gently wash your baby's penis with plain, warm water after each diaper change, and pat it dry. Do not use soap. Don't use hydrogen  "peroxide or alcohol. They can slow healing.     Do not try to remove the film that forms on the penis. The film will go away on its own.     Put plenty of petroleum jelly (such as Vaseline) on the circumcision area during each diaper change. This will prevent your baby's penis from sticking to the diaper while it heals.     Fasten your baby's diapers loosely so that there is less pressure on the penis while it heals.   Follow-up care is a key part of your child's treatment and safety. Be sure to make and go to all appointments, and call your doctor if your child is having problems. It's also a good idea to know your child's test results and keep a list of the medicines your child takes.  When should you call for help?   Call your doctor now or seek immediate medical care if:    Your baby has a fever over 100.4 F.     Your baby is extremely fussy or irritable, has a high-pitched cry, or refuses to eat.     Your baby does not have a wet diaper within 12 hours after the circumcision.     You find a spot of bleeding larger than a 2-inch Kashia from the incision.     Your baby has signs of infection. Signs may include severe swelling; redness; a red streak on the shaft of the penis; or a thick, yellow discharge.   Watch closely for changes in your child's health, and be sure to contact your doctor if:    A Plastibell device was used for the circumcision and the ring has not fallen off after 10 to 12 days.   Where can you learn more?  Go to https://www.[employer].net/patiented  Enter S255 in the search box to learn more about \"Circumcision in Infants: What to Expect at Home.\"  Current as of: October 24, 2024  Content Version: 14.5    [id number] [employer].   Care instructions adapted under license by your healthcare professional. If you have questions about a medical condition or this instruction, always ask your healthcare professional. [employer] disclaims any warranty or liability for your use of " "this information.    Labor Instructions for Midwife Patients    When to call:  Both during and after office hours call  [phone]. There is a triage RN to take your calls and answer your questions 24 hours a day.  If they cannot answer your question they will page the midwife on call for you.    When to call:  Call anytime you have important concerns about you or your baby.     Call if:  You are having contractions at regular intervals about 5-6 minutes apart lasting 60 seconds and becoming increasingly more intense   You have an uncontrollable gush of fluid from your vagina or feel a pop and gush like your water has broken  You have HEAVY bleeding, like heavy period, blood running down your legs, or  soaking a pad.   Some bleeding after a pelvic exam, after intercourse, or in labor when your cervix is dilating is normal and is referred to as \"bloody show\"  You have severe, continuous back or abdominal pain  You feel it is time to go to the hospital  If this is your first labor, call when contractions are very intense and have been about every 3-4 minutes for about an hour  If it is your second labor or more, call when contractions are strong and about every 3-5 minutes or sooner depending on your level of discomfort.     Keep in mind we are always here for you! If you have questions, concerns please don't hesitate to call us.     What to eat/drink in labor: Drink plenty of fluid (water most importantly, juice, soda or tea without caffeine). Eat rice, pasta, soup, cereal, bread/toast, and fruit. Avoid dairy and greasy food as they are difficult to digest and you may experience some nausea during labor.    Comfort measures:  Baths and showers (ok even with ruptured membranes, it may temporarily slow contractions if you are still in the early stage of labor)  Warm/hot packs for back pain or discomfort  Back, belly, or thigh massages  Standing, rocking, walking, leaning over bed or tables, side-lying and " sleeping    Miscellaneous:   Contractions are timed from the beginning of one to the beginning of the next  Try hard to sleep during the early stage of labor when you are not that uncomfortable. Timing of contractions at this point is not important  Even if you cannot sleep, resting in bed or on the couch can help you maintain your energy for labor  When you arrive at the hospital the nurse will check your baby's heartbeat, check your cervix, and will call us. The midwife on call will come in and be with you when you are in active labor  From 7am to 11pm, you can park in the East ramp and enter the hospital through Door 6  After hours you need to enter the hospital through the emergency room     NATURAL LABOR PREPARATION  If you are getting closer and closer to your due date and wondering what you can do to help get your body ready for labor.  These natural labor remedies can helpful for some patients but are not scientifically proven to help the process along.     NOTE: DO NOT begin any of the following prior to 36 completed weeks of pregnancy!    Evening Primrose Oil: Take 1000mg by mouth three times per day. This encourages the cervix to ripen. Cervical ripening is when your cervix becomes more soft and stretchy to get ready for dilation and effacement.     Red Raspberry Leaf Tea: Red raspberry tea (get it at a Co-op or in the grocery store). Drink three cups per day (hot or cold). This can help to prepare the uterine muscles for labor.    Eating dates or pineapple     Fetal Kick Counts    It is important to know when your baby's movements occur. We often get busy with work and life and do not pay close attention to their movements.      Women typically begin feeling movement between 18-22 weeks of gestation, sometimes it can be earlier or later depending on where your placenta is     Movements usually begin feeling like popping or fluttering and as the baby grows they become more pronounced    Toward the end of  "pregnancy as the baby gets larger they may not move as much or make as big of movements. Babies have maturing sleep cycles as well as not as much room to move and flip. If you are ever concerned about your baby's movements or have not felt the baby move for a while, we recommend you do a fetal kick count. Prior to starting your count drink a glass of water or juice and eat a snack. Then lay down on your side and begin to count movements.     How to do a Fetal Kick Counts    There are many different ways to monitor your baby's movements. Movements can range from large jabs to small kicks, or wiggles.  Hiccups count!    Count 10 movements in 2 hours when resting and focusing  Count 10 movements in 12 hours when doing normal activity    We recommend that if movements occur but seem decreased that you should be seen in the clinic or hospital for evaluation within 12 hours. If fetal movement is absent or fetal kick counts are low please contact us right away.    If you ever have any concerns about your baby's movements DO NOT HESITATE to call us, we are here for you!    [facility]    [phone]       PREECLAMPSIA SIGNS AND SYMPTOMS    Preeclampsia is a dangerous condition that some women develop in the second half of pregnancy. It can also begin after the baby is born.  Preeclampsia causes high blood pressure and can cause problems with many organ systems in your body.  It can also affect the growth of your baby. The exact cause of preeclampsia is unknown, however, there are signs and symptoms to watch for:    -A bad headache that doesn't improve with Tylenol  -Visual changes such as spots, flashes of light, blurry vision  -Pain in the upper right part of your abdomen, especially under the ribs that doesn't go away  -Nausea and/or vomiting  -Feeling extremely tired  -Yellowing of the skin and/or eyes  -Feeling \"not quite right\" or that something is wrong  -An extreme amount of swelling (some swelling in " pregnancy is very normal)    If your midwife feels that you are developing preeclampsia, you will have lab tests drawn and will be monitored very closely.     If you are experiencing anyof these symptoms, call the [facility] immediately at [phone].

## 2025-07-02 ENCOUNTER — PRENATAL OFFICE VISIT (OUTPATIENT)
Dept: MIDWIFE SERVICES | Facility: CLINIC | Age: 20
End: 2025-07-02
Payer: COMMERCIAL

## 2025-07-02 ENCOUNTER — RESULTS FOLLOW-UP (OUTPATIENT)
Dept: OBGYN | Facility: CLINIC | Age: 20
End: 2025-07-02

## 2025-07-02 VITALS — DIASTOLIC BLOOD PRESSURE: 64 MMHG | WEIGHT: 250.2 LBS | SYSTOLIC BLOOD PRESSURE: 108 MMHG | BODY MASS INDEX: 42.95 KG/M2

## 2025-07-02 DIAGNOSIS — E78.00 HYPERCHOLESTEREMIA: ICD-10-CM

## 2025-07-02 DIAGNOSIS — O09.91 SUPERVISION OF HIGH RISK PREGNANCY IN FIRST TRIMESTER: Primary | ICD-10-CM

## 2025-07-02 DIAGNOSIS — Z3A.36 36 WEEKS GESTATION OF PREGNANCY: ICD-10-CM

## 2025-07-02 DIAGNOSIS — O99.213 OBESITY AFFECTING PREGNANCY IN THIRD TRIMESTER, UNSPECIFIED OBESITY TYPE: ICD-10-CM

## 2025-07-02 LAB
ERYTHROCYTE [DISTWIDTH] IN BLOOD BY AUTOMATED COUNT: 13 % (ref 10–15)
HCT VFR BLD AUTO: 37.7 % (ref 35–47)
HGB BLD-MCNC: 12.4 G/DL (ref 11.7–15.7)
MCH RBC QN AUTO: 30.5 PG (ref 26.5–33)
MCHC RBC AUTO-ENTMCNC: 32.9 G/DL (ref 31.5–36.5)
MCV RBC AUTO: 93 FL (ref 78–100)
PLATELET # BLD AUTO: 289 10E3/UL (ref 150–450)
RBC # BLD AUTO: 4.06 10E6/UL (ref 3.8–5.2)
WBC # BLD AUTO: 13.7 10E3/UL (ref 4–11)

## 2025-07-02 PROCEDURE — 3074F SYST BP LT 130 MM HG: CPT

## 2025-07-02 PROCEDURE — 85027 COMPLETE CBC AUTOMATED: CPT

## 2025-07-02 PROCEDURE — 87653 STREP B DNA AMP PROBE: CPT

## 2025-07-02 PROCEDURE — 3078F DIAST BP <80 MM HG: CPT

## 2025-07-02 PROCEDURE — 0502F SUBSEQUENT PRENATAL CARE: CPT

## 2025-07-02 PROCEDURE — 99207 PR PRENATAL VISIT: CPT

## 2025-07-02 PROCEDURE — 36415 COLL VENOUS BLD VENIPUNCTURE: CPT

## 2025-07-03 LAB — GP B STREP DNA SPEC QL NAA+PROBE: NEGATIVE

## 2025-07-09 PROBLEM — Z3A.36 36 WEEKS GESTATION OF PREGNANCY: Status: RESOLVED | Noted: 2025-07-01 | Resolved: 2025-07-09

## 2025-07-11 ENCOUNTER — ANCILLARY PROCEDURE (OUTPATIENT)
Dept: ULTRASOUND IMAGING | Facility: CLINIC | Age: 20
End: 2025-07-11
Attending: ADVANCED PRACTICE MIDWIFE
Payer: COMMERCIAL

## 2025-07-11 DIAGNOSIS — O09.91 SUPERVISION OF HIGH RISK PREGNANCY IN FIRST TRIMESTER: ICD-10-CM

## 2025-07-11 PROCEDURE — 76819 FETAL BIOPHYS PROFIL W/O NST: CPT | Performed by: OBSTETRICS & GYNECOLOGY

## 2025-07-14 NOTE — PATIENT INSTRUCTIONS
"Labor Instructions for Midwife Patients    When to call:  Both during and after office hours call  [phone]. There is a triage RN to take your calls and answer your questions 24 hours a day.  If they cannot answer your question they will page the midwife on call for you.    When to call:  Call anytime you have important concerns about you or your baby.     Call if:  You are having contractions at regular intervals about 5-6 minutes apart lasting 60 seconds and becoming increasingly more intense   You have an uncontrollable gush of fluid from your vagina or feel a pop and gush like your water has broken  You have HEAVY bleeding, like heavy period, blood running down your legs, or  soaking a pad.   Some bleeding after a pelvic exam, after intercourse, or in labor when your cervix is dilating is normal and is referred to as \"bloody show\"  You have severe, continuous back or abdominal pain  You feel it is time to go to the hospital  If this is your first labor, call when contractions are very intense and have been about every 3-4 minutes for about an hour  If it is your second labor or more, call when contractions are strong and about every 3-5 minutes or sooner depending on your level of discomfort.     Keep in mind we are always here for you! If you have questions, concerns please don't hesitate to call us.     What to eat/drink in labor: Drink plenty of fluid (water most importantly, juice, soda or tea without caffeine). Eat rice, pasta, soup, cereal, bread/toast, and fruit. Avoid dairy and greasy food as they are difficult to digest and you may experience some nausea during labor.    Comfort measures:  Baths and showers (ok even with ruptured membranes, it may temporarily slow contractions if you are still in the early stage of labor)  Warm/hot packs for back pain or discomfort  Back, belly, or thigh massages  Standing, rocking, walking, leaning over bed or tables, side-lying and sleeping    Miscellaneous: " "  Contractions are timed from the beginning of one to the beginning of the next  Try hard to sleep during the early stage of labor when you are not that uncomfortable. Timing of contractions at this point is not important  Even if you cannot sleep, resting in bed or on the couch can help you maintain your energy for labor  When you arrive at the hospital the nurse will check your baby's heartbeat, check your cervix, and will call us. The midwife on call will come in and be with you when you are in active labor  From 7am to 11pm, you can park in the East ramp and enter the hospital through Door 6  After hours you need to enter the hospital through the emergency room      PREECLAMPSIA SIGNS AND SYMPTOMS    Preeclampsia is a dangerous condition that some women develop in the second half of pregnancy. It can also begin after the baby is born.  Preeclampsia causes high blood pressure and can cause problems with many organ systems in your body.  It can also affect the growth of your baby. The exact cause of preeclampsia is unknown, however, there are signs and symptoms to watch for:    -A bad headache that doesn't improve with Tylenol  -Visual changes such as spots, flashes of light, blurry vision  -Pain in the upper right part of your abdomen, especially under the ribs that doesn't go away  -Nausea and/or vomiting  -Feeling extremely tired  -Yellowing of the skin and/or eyes  -Feeling \"not quite right\" or that something is wrong  -An extreme amount of swelling (some swelling in pregnancy is very normal)    If your midwife feels that you are developing preeclampsia, you will have lab tests drawn and will be monitored very closely.     If you are experiencing anyof these symptoms, call the [facility] immediately at [phone].    Fetal Kick Counts    It is important to know when your baby's movements occur. We often get busy with work and life and do not pay close attention to their movements.    "   Women typically begin feeling movement between 18-22 weeks of gestation, sometimes it can be earlier or later depending on where your placenta is     Movements usually begin feeling like popping or fluttering and as the baby grows they become more pronounced    Toward the end of pregnancy as the baby gets larger they may not move as much or make as big of movements. Babies have maturing sleep cycles as well as not as much room to move and flip. If you are ever concerned about your baby's movements or have not felt the baby move for a while, we recommend you do a fetal kick count. Prior to starting your count drink a glass of water or juice and eat a snack. Then lay down on your side and begin to count movements.     How to do a Fetal Kick Counts    There are many different ways to monitor your baby's movements. Movements can range from large jabs to small kicks, or wiggles.  Hiccups count!    Count 10 movements in 2 hours when resting and focusing  Count 10 movements in 12 hours when doing normal activity    We recommend that if movements occur but seem decreased that you should be seen in the clinic or hospital for evaluation within 12 hours. If fetal movement is absent or fetal kick counts are low please contact us right away.    If you ever have any concerns about your baby's movements DO NOT HESITATE to call us, we are here for you!    [facility]    [phone]

## 2025-07-14 NOTE — PROGRESS NOTES
38w5d  Feels well overall. Here with Ramonita today. Would like to set up IOL for end of 39th week   Does not want SVE today, had one 5 days ago and was FT/high. Will plan for cervix check at  visit   Fetal Movement: present   Denies loss of fluid/vb & s/sx of preeclampsia.   BPP 8/8, cephalic, normal MVP,    Garcia Score: 2 (based on SVE from )  Discussed need for cervical ripening, briefly reviewed options, specifically oral vs vag miso. Discussed expectations for IOL length, IOL process including cervical ripening from 12-36 hrs then pit/AROM. Has birthplan, will bring with to hospital     Obesity in pregnancy: BMI 37.9; ASA. Growth @ 32w [64%]. Weekly BPPs beginning at 37 weeks.  TW#  Fetal right UTDA1: Resolved on 32w ultrasound.   Fetal CPC: Declined NIPS. Not noted on  or / ultrasounds.     Fetal kick/movement counts reviewed  Labor signs and symptoms discussed, aware of numbers to call  Discussed warning signs, signs and symptoms preeclampsia  Cervical ripening scheduled for  at 1930. CNM notified    Return to clinic 1 week    ADALGISA Flores, JOSE GUADALUPE

## 2025-07-16 ENCOUNTER — PRENATAL OFFICE VISIT (OUTPATIENT)
Dept: MIDWIFE SERVICES | Facility: CLINIC | Age: 20
End: 2025-07-16
Attending: ADVANCED PRACTICE MIDWIFE
Payer: COMMERCIAL

## 2025-07-16 ENCOUNTER — ANCILLARY PROCEDURE (OUTPATIENT)
Dept: ULTRASOUND IMAGING | Facility: CLINIC | Age: 20
End: 2025-07-16
Attending: ADVANCED PRACTICE MIDWIFE
Payer: COMMERCIAL

## 2025-07-16 VITALS
SYSTOLIC BLOOD PRESSURE: 110 MMHG | BODY MASS INDEX: 42.95 KG/M2 | WEIGHT: 251.6 LBS | DIASTOLIC BLOOD PRESSURE: 58 MMHG | HEIGHT: 64 IN

## 2025-07-16 DIAGNOSIS — O99.213 OBESITY AFFECTING PREGNANCY IN THIRD TRIMESTER, UNSPECIFIED OBESITY TYPE: ICD-10-CM

## 2025-07-16 DIAGNOSIS — O09.91 SUPERVISION OF HIGH RISK PREGNANCY IN FIRST TRIMESTER: Primary | ICD-10-CM

## 2025-07-16 DIAGNOSIS — O09.91 SUPERVISION OF HIGH RISK PREGNANCY IN FIRST TRIMESTER: ICD-10-CM

## 2025-07-16 DIAGNOSIS — Z3A.38 38 WEEKS GESTATION OF PREGNANCY: ICD-10-CM

## 2025-07-16 DIAGNOSIS — O35.EXX0 PYELECTASIS OF FETUS ON PRENATAL ULTRASOUND: ICD-10-CM

## 2025-07-16 DIAGNOSIS — O35.03X0 CHOROID PLEXUS CYST OF FETUS IN SINGLETON PREGNANCY: ICD-10-CM

## 2025-07-16 PROCEDURE — 76819 FETAL BIOPHYS PROFIL W/O NST: CPT | Performed by: OBSTETRICS & GYNECOLOGY

## 2025-07-16 PROCEDURE — 3078F DIAST BP <80 MM HG: CPT | Performed by: ADVANCED PRACTICE MIDWIFE

## 2025-07-16 PROCEDURE — 3074F SYST BP LT 130 MM HG: CPT | Performed by: ADVANCED PRACTICE MIDWIFE

## 2025-07-16 PROCEDURE — 0502F SUBSEQUENT PRENATAL CARE: CPT | Performed by: ADVANCED PRACTICE MIDWIFE

## 2025-07-16 PROCEDURE — 99207 PR PRENATAL VISIT: CPT | Performed by: ADVANCED PRACTICE MIDWIFE

## 2025-07-23 ENCOUNTER — ANCILLARY PROCEDURE (OUTPATIENT)
Dept: ULTRASOUND IMAGING | Facility: CLINIC | Age: 20
End: 2025-07-23
Attending: ADVANCED PRACTICE MIDWIFE
Payer: COMMERCIAL

## 2025-07-23 ENCOUNTER — PRENATAL OFFICE VISIT (OUTPATIENT)
Dept: MIDWIFE SERVICES | Facility: CLINIC | Age: 20
End: 2025-07-23
Attending: ADVANCED PRACTICE MIDWIFE
Payer: COMMERCIAL

## 2025-07-23 VITALS
DIASTOLIC BLOOD PRESSURE: 58 MMHG | BODY MASS INDEX: 43.87 KG/M2 | HEIGHT: 64 IN | WEIGHT: 257 LBS | SYSTOLIC BLOOD PRESSURE: 116 MMHG

## 2025-07-23 DIAGNOSIS — Z3A.39 39 WEEKS GESTATION OF PREGNANCY: ICD-10-CM

## 2025-07-23 DIAGNOSIS — O35.03X0 CHOROID PLEXUS CYST OF FETUS IN SINGLETON PREGNANCY: ICD-10-CM

## 2025-07-23 DIAGNOSIS — O99.213 OBESITY AFFECTING PREGNANCY IN THIRD TRIMESTER, UNSPECIFIED OBESITY TYPE: ICD-10-CM

## 2025-07-23 DIAGNOSIS — E78.00 HYPERCHOLESTEREMIA: ICD-10-CM

## 2025-07-23 DIAGNOSIS — O09.91 SUPERVISION OF HIGH RISK PREGNANCY IN FIRST TRIMESTER: Primary | ICD-10-CM

## 2025-07-23 DIAGNOSIS — O09.91 SUPERVISION OF HIGH RISK PREGNANCY IN FIRST TRIMESTER: ICD-10-CM

## 2025-07-23 PROCEDURE — 3074F SYST BP LT 130 MM HG: CPT

## 2025-07-23 PROCEDURE — 99207 PR PRENATAL VISIT: CPT

## 2025-07-23 PROCEDURE — 76819 FETAL BIOPHYS PROFIL W/O NST: CPT | Performed by: STUDENT IN AN ORGANIZED HEALTH CARE EDUCATION/TRAINING PROGRAM

## 2025-07-23 PROCEDURE — 0502F SUBSEQUENT PRENATAL CARE: CPT

## 2025-07-23 PROCEDURE — 3078F DIAST BP <80 MM HG: CPT

## 2025-07-23 NOTE — PROGRESS NOTES
39w5d  Feels well overall. Here with Ramonita today.  Fetal Movement: present, some intermittent lower abdominal cramping.    Denies loss of fluid/vb & s/sx of preeclampsia.     BPP US today:  MVP: 3.87  FHT: 138  Position: cephalic    VE: FT/thick/-4  Garcia Score:  Dilation of Cervix:  0        Score = 0  Effacement:  0-30%;   Score = 0  Consistency:  Average;   Score = 1  Position:  Posterior;   Score = 0  Station:  -3;  Score = 0  Total Garcia Score:  1    Obesity in pregnancy: BMI 37.9; ASA. Growth @ 32w [64%]. Weekly BPPs beginning at 37 weeks. CR planned for 25.  TW#  Fetal right UTDA1: Resolved on 32w ultrasound.   Fetal CPC: Declined NIPS. Not noted on  or  ultrasounds.     Reviewed CR and IOL process and expectations. Provided with IOL reminder sheet and map with numbers to call. Planning PO cytotec for CR.    Fetal kick/movement counts reviewed  Labor signs and symptoms discussed, aware of numbers to call  Discussed warning signs, signs and symptoms preeclampsia    Return to clinic 1 week    Meme Joe, ADALGISA, CNM

## 2025-07-23 NOTE — PATIENT INSTRUCTIONS
"Week 39 of Your Pregnancy: Care Instructions     babies can look different than what you see in pictures or movies. Their heads can be a strange shape right after birth. And they may have swollen eyes and red marks on their faces.   You can still get pregnant even if you are breastfeeding. If you don't want to get pregnant, talk to your doctor about birth control.   Tips for week 39 of pregnancy  If you plan to breastfeed, get prepared.       Continue to eat healthy foods.  Keep taking your prenatal vitamins during breastfeeding if your doctor recommends it.  Talk to your doctor before taking any medicines or supplements.  Choose the right birth control for you.       Intrauterine devices (IUDs) are placed in the uterus. Sometimes the IUD can be placed right after giving birth. They work for years.  Hormonal implants are placed under the skin of the arm. They also work for years.  Depo-Provera is a shot. You get it every 3 months.  Birth control pills can be used. They're taken every day.  Tubal ligation (tying your tubes) and vasectomy are surgeries. They're permanent.  Diaphragms, spermicide, and condoms must be used each time you have sex. If you used a diaphragm before, you should get refitted after the baby is born.  A birth control patch or ring can be used. These just can't be started until several weeks after you give birth.  Follow-up care is a key part of your treatment and safety. Be sure to make and go to all appointments, and call your doctor if you are having problems. It's also a good idea to know your test results and keep a list of the medicines you take.  Where can you learn more?  Go to https://www.[employer].net/patiented  Enter A811 in the search box to learn more about \"Week 39 of Your Pregnancy: Care Instructions.\"  Current as of: 2024  Content Version: 14.5    1882-9670 [employer].   Care instructions adapted under license by your healthcare professional. If you " "have questions about a medical condition or this instruction, always ask your healthcare professional. [employer] disclaims any warranty or liability for your use of this information.    Labor Induction  What You Need to Know  What is labor induction?  In most cases, it is best to go into labor naturally. When labor does not start on its own, we may use medicine or other methods to start (induce) labor. This is called induction, which:  Helps the uterus contract  Thins, softens and opens the cervix (opening of the uterus)  When is induction used?  There are two types of induction.  Medical induction may be done to protect the health of the parent or baby if:  There are medical concerns for you or your baby.  You haven't had your baby by 41 weeks.  Induction for non-medical reasons may be done at 39 weeks or later if:  You live far from the hospital.  You've been having contractions for many days.  You've given birth quickly in the past.   We will not perform an induction for non-medical reasons before 39 weeks. Studies show that babies born before 39 weeks may struggle with breathing, feeding, sleeping and staying warm. They are more likely to have health problems and may need to stay in the hospital longer. If we start your labor for medical reasons, the benefits will outweigh these risks. We will talk to you about your risks, benefits and alternatives (other options) before we start your labor.  How is induction done?  We may start your labor by doing one or more of the following:  We may need to help your cervix soften and open (sometimes called \"ripening\") to prepare it for labor. There are two ways to do this:  Medicines--these may be in the form of pills that you swallow or medicines that are put into the vagina next to the cervix.  Mechanical--using either a single or double balloon. These are small balloons that are attached to tubes that go up inside the cervix. The balloons are then filled with " "water to put pressure on the cervix and help the cervix soften and open. Depending on the type of balloon used, you may be allowed to go home after it is placed.  After your cervix is ready:  We may give you medicine through an IV (a small tube placed in your vein). This medicine is called Pitocin. It helps the muscles of your uterus to contract.  We may make a small opening in your bag of water (the sac around your baby). This is called an amniotomy. Sometimes called \"breaking the water\". It may help your uterus contract and your cervix open.  What might happen if my labor is induced?  Some of this depends on how your labor is started and how your body responds. Your labor may be more complicated. You and your baby may need more medical treatments. In general:  You may not go into labor right away. If so, we may send you home with follow-up instructions.  It will be important to monitor you and your baby during the induction.  You may not be able to move around during labor. You will have two belts with monitors attached to your belly. These allow the nurse to watch your contractions and your baby's heart rate.  Your labor may take longer than if you went into labor on your own. It might take more than one day.  If you need cervical ripening, it is important to know that it may be many hours (even days) until delivery happens.  Cervical ripening can be slow and require several doses before your body is ready to labor.  A long labor may increase the risk of infection in mother and baby.  Your labor may not progress as planned. This could lead to a  birth.  Can I plan the date of my delivery?  After 39 weeks, you may ask about planning your delivery date. This is only an option if your body--and your baby--are ready. To find out, we will check your cervix and your baby's heart rate.   If you are ready to be induced, we will give you a date and time to come to the hospital. If many patients are in labor that " day, we may need to start your labor at another time.  If you are not ready, we will not start your labor. It will be safer for your baby to come on its own.  What else do I need to know?  Before you have an induction, make sure you understand the reasons, risks and benefits. Ask your doctor or nurse-midwife:  Why do I need to be induced?  What are the risks to my baby?  How will you start my labor?  How will you know if my baby is ready to be born?  How will you know if my body is ready to give birth?  Where can I get more information?  To learn more about induction, you may visit these websites:  The American College of Nurse-Midwives:  www.mymidwife.org  The American College of Obstetricians and Gynecologists: www.acog.org  Childbirth Connection:  www.childbirthconnection.org  March of Dimes: www.marchofdimes.LISNR  Association of Women's Health, Obstetrics, and  Nursing  www.xnjdsr7rxq.org/go-the-full-40&#047;  For informational purposes only. Not to replace the advice of your health care provider. Copyright   2008 [facility]. All rights reserved. Clinically reviewed by the  System Operations Leadership team. Domob 854941 - REV .    Labor Instructions for Midwife Patients    When to call:  Both during and after office hours call  [phone]. There is a triage RN to take your calls and answer your questions 24 hours a day.  If they cannot answer your question they will page the midwife on call for you.    When to call:  Call anytime you have important concerns about you or your baby.     Call if:  You are having contractions at regular intervals about 5-6 minutes apart lasting 60 seconds and becoming increasingly more intense   You have an uncontrollable gush of fluid from your vagina or feel a pop and gush like your water has broken  You have HEAVY bleeding, like heavy period, blood running down your legs, or  soaking a pad.   Some bleeding after a pelvic exam, after  "intercourse, or in labor when your cervix is dilating is normal and is referred to as \"bloody show\"  You have severe, continuous back or abdominal pain  You feel it is time to go to the hospital  If this is your first labor, call when contractions are very intense and have been about every 3-4 minutes for about an hour  If it is your second labor or more, call when contractions are strong and about every 3-5 minutes or sooner depending on your level of discomfort.     Keep in mind we are always here for you! If you have questions, concerns please don't hesitate to call us.     What to eat/drink in labor: Drink plenty of fluid (water most importantly, juice, soda or tea without caffeine). Eat rice, pasta, soup, cereal, bread/toast, and fruit. Avoid dairy and greasy food as they are difficult to digest and you may experience some nausea during labor.    Comfort measures:  Baths and showers (ok even with ruptured membranes, it may temporarily slow contractions if you are still in the early stage of labor)  Warm/hot packs for back pain or discomfort  Back, belly, or thigh massages  Standing, rocking, walking, leaning over bed or tables, side-lying and sleeping    Miscellaneous:   Contractions are timed from the beginning of one to the beginning of the next  Try hard to sleep during the early stage of labor when you are not that uncomfortable. Timing of contractions at this point is not important  Even if you cannot sleep, resting in bed or on the couch can help you maintain your energy for labor  When you arrive at the hospital the nurse will check your baby's heartbeat, check your cervix, and will call us. The midwife on call will come in and be with you when you are in active labor  From 7am to 11pm, you can park in the East ramp and enter the hospital through Door 6  After hours you need to enter the hospital through the emergency room     Fetal Kick Counts    It is important to know when your baby's movements occur. " We often get busy with work and life and do not pay close attention to their movements.      Women typically begin feeling movement between 18-22 weeks of gestation, sometimes it can be earlier or later depending on where your placenta is     Movements usually begin feeling like popping or fluttering and as the baby grows they become more pronounced    Toward the end of pregnancy as the baby gets larger they may not move as much or make as big of movements. Babies have maturing sleep cycles as well as not as much room to move and flip. If you are ever concerned about your baby's movements or have not felt the baby move for a while, we recommend you do a fetal kick count. Prior to starting your count drink a glass of water or juice and eat a snack. Then lay down on your side and begin to count movements.     How to do a Fetal Kick Counts    There are many different ways to monitor your baby's movements. Movements can range from large jabs to small kicks, or wiggles.  Hiccups count!    Count 10 movements in 2 hours when resting and focusing  Count 10 movements in 12 hours when doing normal activity    We recommend that if movements occur but seem decreased that you should be seen in the clinic or hospital for evaluation within 12 hours. If fetal movement is absent or fetal kick counts are low please contact us right away.    If you ever have any concerns about your baby's movements DO NOT HESITATE to call us, we are here for you!    [facility]    [phone]       PREECLAMPSIA SIGNS AND SYMPTOMS    Preeclampsia is a dangerous condition that some women develop in the second half of pregnancy. It can also begin after the baby is born.  Preeclampsia causes high blood pressure and can cause problems with many organ systems in your body.  It can also affect the growth of your baby. The exact cause of preeclampsia is unknown, however, there are signs and symptoms to watch for:    -A bad headache that doesn't  "improve with Tylenol  -Visual changes such as spots, flashes of light, blurry vision  -Pain in the upper right part of your abdomen, especially under the ribs that doesn't go away  -Nausea and/or vomiting  -Feeling extremely tired  -Yellowing of the skin and/or eyes  -Feeling \"not quite right\" or that something is wrong  -An extreme amount of swelling (some swelling in pregnancy is very normal)    If your midwife feels that you are developing preeclampsia, you will have lab tests drawn and will be monitored very closely.     If you are experiencing anyof these symptoms, call the [facility] immediately at [phone].   "

## 2025-07-24 ENCOUNTER — HOSPITAL ENCOUNTER (INPATIENT)
Facility: CLINIC | Age: 20
LOS: 6 days | Discharge: HOME | End: 2025-07-30
Admitting: ADVANCED PRACTICE MIDWIFE
Payer: COMMERCIAL

## 2025-07-24 DIAGNOSIS — D62 ANEMIA DUE TO BLOOD LOSS, ACUTE: ICD-10-CM

## 2025-07-24 DIAGNOSIS — O09.91 SUPERVISION OF HIGH RISK PREGNANCY IN FIRST TRIMESTER: Primary | ICD-10-CM

## 2025-07-24 DIAGNOSIS — O13.3 GESTATIONAL HYPERTENSION, THIRD TRIMESTER: ICD-10-CM

## 2025-07-24 LAB
ABO + RH BLD: NORMAL
BLD GP AB SCN SERPL QL: NEGATIVE
ERYTHROCYTE [DISTWIDTH] IN BLOOD BY AUTOMATED COUNT: 13.3 % (ref 10–15)
HCT VFR BLD AUTO: 33.8 % (ref 35–47)
HGB BLD-MCNC: 11.5 G/DL (ref 11.7–15.7)
MCH RBC QN AUTO: 30.9 PG (ref 26.5–33)
MCHC RBC AUTO-ENTMCNC: 34 G/DL (ref 31.5–36.5)
MCV RBC AUTO: 91 FL (ref 78–100)
PLATELET # BLD AUTO: 267 10E3/UL (ref 150–450)
RBC # BLD AUTO: 3.72 10E6/UL (ref 3.8–5.2)
SPECIMEN EXP DATE BLD: NORMAL
WBC # BLD AUTO: 13.6 10E3/UL (ref 4–11)

## 2025-07-24 PROCEDURE — 120N000013 HC R&B IMCU

## 2025-07-24 PROCEDURE — 85018 HEMOGLOBIN: CPT

## 2025-07-24 PROCEDURE — 250N000013 HC RX MED GY IP 250 OP 250 PS 637

## 2025-07-24 PROCEDURE — 36415 COLL VENOUS BLD VENIPUNCTURE: CPT

## 2025-07-24 PROCEDURE — 99221 1ST HOSP IP/OBS SF/LOW 40: CPT

## 2025-07-24 PROCEDURE — 86900 BLOOD TYPING SEROLOGIC ABO: CPT

## 2025-07-24 PROCEDURE — 86780 TREPONEMA PALLIDUM: CPT

## 2025-07-24 PROCEDURE — 85014 HEMATOCRIT: CPT

## 2025-07-24 RX ORDER — CITRIC ACID/SODIUM CITRATE 334-500MG
30 SOLUTION, ORAL ORAL
Status: DISCONTINUED | OUTPATIENT
Start: 2025-07-24 | End: 2025-07-28 | Stop reason: HOSPADM

## 2025-07-24 RX ORDER — KETOROLAC TROMETHAMINE 15 MG/ML
15 INJECTION, SOLUTION INTRAMUSCULAR; INTRAVENOUS
Status: DISCONTINUED | OUTPATIENT
Start: 2025-07-24 | End: 2025-07-28

## 2025-07-24 RX ORDER — NALOXONE HYDROCHLORIDE 0.4 MG/ML
0.4 INJECTION, SOLUTION INTRAMUSCULAR; INTRAVENOUS; SUBCUTANEOUS
Status: DISCONTINUED | OUTPATIENT
Start: 2025-07-24 | End: 2025-07-30 | Stop reason: HOSPADM

## 2025-07-24 RX ORDER — TERBUTALINE SULFATE 1 MG/ML
0.25 INJECTION SUBCUTANEOUS
Status: DISCONTINUED | OUTPATIENT
Start: 2025-07-24 | End: 2025-07-28 | Stop reason: HOSPADM

## 2025-07-24 RX ORDER — OXYTOCIN 10 [USP'U]/ML
10 INJECTION, SOLUTION INTRAMUSCULAR; INTRAVENOUS
Status: DISCONTINUED | OUTPATIENT
Start: 2025-07-24 | End: 2025-07-30 | Stop reason: HOSPADM

## 2025-07-24 RX ORDER — ONDANSETRON 4 MG/1
4 TABLET, ORALLY DISINTEGRATING ORAL EVERY 6 HOURS PRN
Status: DISCONTINUED | OUTPATIENT
Start: 2025-07-24 | End: 2025-07-28 | Stop reason: HOSPADM

## 2025-07-24 RX ORDER — LOPERAMIDE HYDROCHLORIDE 2 MG/1
4 CAPSULE ORAL
Status: DISCONTINUED | OUTPATIENT
Start: 2025-07-24 | End: 2025-07-28 | Stop reason: HOSPADM

## 2025-07-24 RX ORDER — LOPERAMIDE HYDROCHLORIDE 2 MG/1
2 CAPSULE ORAL
Status: DISCONTINUED | OUTPATIENT
Start: 2025-07-24 | End: 2025-07-28 | Stop reason: HOSPADM

## 2025-07-24 RX ORDER — MORPHINE SULFATE 10 MG/ML
15 INJECTION, SOLUTION INTRAMUSCULAR; INTRAVENOUS
Status: DISCONTINUED | OUTPATIENT
Start: 2025-07-24 | End: 2025-07-28 | Stop reason: HOSPADM

## 2025-07-24 RX ORDER — TRANEXAMIC ACID 10 MG/ML
1 INJECTION, SOLUTION INTRAVENOUS EVERY 30 MIN PRN
Status: DISCONTINUED | OUTPATIENT
Start: 2025-07-24 | End: 2025-07-28 | Stop reason: HOSPADM

## 2025-07-24 RX ORDER — IBUPROFEN 400 MG/1
800 TABLET, FILM COATED ORAL
Status: DISCONTINUED | OUTPATIENT
Start: 2025-07-24 | End: 2025-07-28

## 2025-07-24 RX ORDER — METHYLERGONOVINE MALEATE 0.2 MG/ML
200 INJECTION INTRAVENOUS
Status: DISCONTINUED | OUTPATIENT
Start: 2025-07-24 | End: 2025-07-28 | Stop reason: HOSPADM

## 2025-07-24 RX ORDER — OXYTOCIN/0.9 % SODIUM CHLORIDE 30/500 ML
340 PLASTIC BAG, INJECTION (ML) INTRAVENOUS CONTINUOUS PRN
Status: DISCONTINUED | OUTPATIENT
Start: 2025-07-24 | End: 2025-07-28 | Stop reason: HOSPADM

## 2025-07-24 RX ORDER — METOCLOPRAMIDE 10 MG/1
10 TABLET ORAL EVERY 6 HOURS PRN
Status: DISCONTINUED | OUTPATIENT
Start: 2025-07-24 | End: 2025-07-28 | Stop reason: HOSPADM

## 2025-07-24 RX ORDER — ONDANSETRON 2 MG/ML
4 INJECTION INTRAMUSCULAR; INTRAVENOUS EVERY 6 HOURS PRN
Status: DISCONTINUED | OUTPATIENT
Start: 2025-07-24 | End: 2025-07-28 | Stop reason: HOSPADM

## 2025-07-24 RX ORDER — OXYTOCIN/0.9 % SODIUM CHLORIDE 30/500 ML
100-340 PLASTIC BAG, INJECTION (ML) INTRAVENOUS CONTINUOUS PRN
Status: DISCONTINUED | OUTPATIENT
Start: 2025-07-24 | End: 2025-07-30 | Stop reason: HOSPADM

## 2025-07-24 RX ORDER — HYDROXYZINE HYDROCHLORIDE 25 MG/1
50 TABLET, FILM COATED ORAL
Status: DISCONTINUED | OUTPATIENT
Start: 2025-07-24 | End: 2025-07-25

## 2025-07-24 RX ORDER — NALOXONE HYDROCHLORIDE 0.4 MG/ML
0.2 INJECTION, SOLUTION INTRAMUSCULAR; INTRAVENOUS; SUBCUTANEOUS
Status: DISCONTINUED | OUTPATIENT
Start: 2025-07-24 | End: 2025-07-30 | Stop reason: HOSPADM

## 2025-07-24 RX ORDER — OXYTOCIN 10 [USP'U]/ML
10 INJECTION, SOLUTION INTRAMUSCULAR; INTRAVENOUS
Status: DISCONTINUED | OUTPATIENT
Start: 2025-07-24 | End: 2025-07-28 | Stop reason: HOSPADM

## 2025-07-24 RX ORDER — MISOPROSTOL 200 UG/1
400 TABLET ORAL
Status: DISCONTINUED | OUTPATIENT
Start: 2025-07-24 | End: 2025-07-28 | Stop reason: HOSPADM

## 2025-07-24 RX ORDER — METOCLOPRAMIDE HYDROCHLORIDE 5 MG/ML
10 INJECTION INTRAMUSCULAR; INTRAVENOUS EVERY 6 HOURS PRN
Status: DISCONTINUED | OUTPATIENT
Start: 2025-07-24 | End: 2025-07-28 | Stop reason: HOSPADM

## 2025-07-24 RX ORDER — SODIUM CHLORIDE, SODIUM LACTATE, POTASSIUM CHLORIDE, CALCIUM CHLORIDE 600; 310; 30; 20 MG/100ML; MG/100ML; MG/100ML; MG/100ML
INJECTION, SOLUTION INTRAVENOUS CONTINUOUS
Status: DISCONTINUED | OUTPATIENT
Start: 2025-07-24 | End: 2025-07-28 | Stop reason: HOSPADM

## 2025-07-24 RX ORDER — MISOPROSTOL 200 UG/1
800 TABLET ORAL
Status: DISCONTINUED | OUTPATIENT
Start: 2025-07-24 | End: 2025-07-28 | Stop reason: HOSPADM

## 2025-07-24 RX ORDER — LIDOCAINE 40 MG/G
CREAM TOPICAL
Status: DISCONTINUED | OUTPATIENT
Start: 2025-07-24 | End: 2025-07-30 | Stop reason: HOSPADM

## 2025-07-24 RX ORDER — CARBOPROST TROMETHAMINE 250 UG/ML
250 INJECTION, SOLUTION INTRAMUSCULAR
Status: DISCONTINUED | OUTPATIENT
Start: 2025-07-24 | End: 2025-07-28 | Stop reason: HOSPADM

## 2025-07-24 RX ORDER — MISOPROSTOL 100 UG/1
25 TABLET ORAL
Status: COMPLETED | OUTPATIENT
Start: 2025-07-24 | End: 2025-07-25

## 2025-07-24 RX ORDER — FENTANYL CITRATE 50 UG/ML
100 INJECTION, SOLUTION INTRAMUSCULAR; INTRAVENOUS
Status: DISCONTINUED | OUTPATIENT
Start: 2025-07-24 | End: 2025-07-28 | Stop reason: HOSPADM

## 2025-07-24 RX ORDER — PROCHLORPERAZINE MALEATE 5 MG/1
10 TABLET ORAL EVERY 6 HOURS PRN
Status: DISCONTINUED | OUTPATIENT
Start: 2025-07-24 | End: 2025-07-28 | Stop reason: HOSPADM

## 2025-07-24 RX ORDER — ACETAMINOPHEN 325 MG/1
650 TABLET ORAL EVERY 4 HOURS PRN
Status: DISCONTINUED | OUTPATIENT
Start: 2025-07-24 | End: 2025-07-28 | Stop reason: HOSPADM

## 2025-07-24 RX ADMIN — Medication 25 MCG: at 22:33

## 2025-07-24 RX ADMIN — Medication 25 MCG: at 20:35

## 2025-07-24 ASSESSMENT — ACTIVITIES OF DAILY LIVING (ADL)
ADLS_ACUITY_SCORE: 15
ADLS_ACUITY_SCORE: 41
ADLS_ACUITY_SCORE: 15
ADLS_ACUITY_SCORE: 15

## 2025-07-25 LAB — T PALLIDUM AB SER QL: NONREACTIVE

## 2025-07-25 PROCEDURE — 250N000009 HC RX 250

## 2025-07-25 PROCEDURE — 120N000013 HC R&B IMCU

## 2025-07-25 PROCEDURE — 59200 INSERT CERVICAL DILATOR: CPT | Performed by: NURSE PRACTITIONER

## 2025-07-25 PROCEDURE — 99231 SBSQ HOSP IP/OBS SF/LOW 25: CPT | Mod: 25 | Performed by: NURSE PRACTITIONER

## 2025-07-25 PROCEDURE — 3E0P7VZ INTRODUCTION OF HORMONE INTO FEMALE REPRODUCTIVE, VIA NATURAL OR ARTIFICIAL OPENING: ICD-10-PCS | Performed by: NURSE PRACTITIONER

## 2025-07-25 PROCEDURE — 250N000013 HC RX MED GY IP 250 OP 250 PS 637

## 2025-07-25 PROCEDURE — 250N000011 HC RX IP 250 OP 636

## 2025-07-25 RX ORDER — LIDOCAINE HYDROCHLORIDE 20 MG/ML
JELLY TOPICAL EVERY 4 HOURS PRN
Status: DISCONTINUED | OUTPATIENT
Start: 2025-07-25 | End: 2025-07-30 | Stop reason: HOSPADM

## 2025-07-25 RX ORDER — HYDROXYZINE HYDROCHLORIDE 25 MG/1
50-100 TABLET, FILM COATED ORAL
Status: DISCONTINUED | OUTPATIENT
Start: 2025-07-25 | End: 2025-07-28 | Stop reason: HOSPADM

## 2025-07-25 RX ADMIN — Medication 25 MCG: at 02:32

## 2025-07-25 RX ADMIN — Medication 25 MCG: at 06:33

## 2025-07-25 RX ADMIN — Medication 25 MCG: at 08:29

## 2025-07-25 RX ADMIN — Medication 25 MCG: at 12:35

## 2025-07-25 RX ADMIN — Medication 25 MCG: at 16:31

## 2025-07-25 RX ADMIN — Medication 25 MCG: at 14:28

## 2025-07-25 RX ADMIN — DINOPROSTONE 10 MG: 10 INSERT VAGINAL at 19:02

## 2025-07-25 RX ADMIN — Medication 25 MCG: at 04:35

## 2025-07-25 RX ADMIN — LIDOCAINE HYDROCHLORIDE: 20 JELLY TOPICAL at 18:31

## 2025-07-25 RX ADMIN — Medication 25 MCG: at 10:27

## 2025-07-25 RX ADMIN — METOCLOPRAMIDE 10 MG: 5 INJECTION, SOLUTION INTRAMUSCULAR; INTRAVENOUS at 14:28

## 2025-07-25 RX ADMIN — Medication 25 MCG: at 00:31

## 2025-07-25 ASSESSMENT — ACTIVITIES OF DAILY LIVING (ADL)
ADLS_ACUITY_SCORE: 15

## 2025-07-25 NOTE — PLAN OF CARE
Bedside report received from Anjana Manuel RN and care of the pt assumed.     Upon assessment, Madelin is laying in the bed sleeping. Mother and mother-in-law are at the bedside. No assessments needed at this time. Pt's family instructed to call when pt awakens. Fht's on monitor 125 with moderate variability, accels present and no decels. Presumed adequate oxygenation at this time.

## 2025-07-25 NOTE — PLAN OF CARE
Goal Outcome Evaluation:    Patient admitted at 39+6 for IOL for high BMI. Patient Garcia 1, started on oral cytotec at 2030, patient received 6 doses of PO cytotec. To be assess in AM by CNM.    VSS, able to sleep throughout night, did not need anything for pains/sleep. Feeling cramping this AM, heat pack given and sitting on birthing ball as unable to sleep/uncomfortable.  Significant other, mother and mother in law supportive. Patient to order breakfast.    Handover of care tessy Kimbrough RN at 0715.        Problem: Labor  Goal: Hemostasis  Outcome: Progressing  Goal: Stable Fetal Wellbeing  Outcome: Progressing  Goal: Effective Progression to Delivery  Outcome: Progressing  Goal: Absence of Infection Signs and Symptoms  Outcome: Progressing  Goal: Acceptable Pain Control  Outcome: Progressing  Goal: Normal Uterine Contraction Pattern  Outcome: Progressing       Problem: Adult Inpatient Plan of Care  Goal: Plan of Care Review    Outcome: Progressing  Goal: Patient-Specific Goal (Individualized)    Outcome: Progressing  Goal: Absence of Hospital-Acquired Illness or Injury  Outcome: Progressing  Goal: Optimal Comfort and Wellbeing  Outcome: Progressing  Intervention: Provide Person-Centered Care  Recent Flowsheet Documentation  Taken 7/25/2025 0000 by Anjana Urena, RN  Trust Relationship/Rapport:   care explained   choices provided   emotional support provided   empathic listening provided   questions answered   questions encouraged   reassurance provided   thoughts/feelings acknowledged  Goal: Readiness for Transition of Care  Outcome: Progressing  Intervention: Mutually Develop Transition Plan  Recent Flowsheet Documentation  Taken 7/24/2025 2007 by Anjana Urena, RN  Equipment Currently Used at Home: none

## 2025-07-25 NOTE — PROGRESS NOTES
Data: Patient admitted to room 218 at  . Patient is a . Prenatal record reviewed.   OB History    Para Term  AB Living   1 0 0 0 0 0   SAB IAB Ectopic Multiple Live Births   0 0 0 0 0      # Outcome Date GA Lbr Fernandez/2nd Weight Sex Type Anes PTL Lv   1 Current            .  Medical History:   Past Medical History:   Diagnosis Date    High cholesterol     Menorrhagia    .  Gestational age 39w6d. Vital signs per doc flowsheet. Fetal movement present. Patient reports Induction Of Labor   as reason for admission. Support persons Ramonita present.  Action: Care of patient assumed at . Verbal consent for EFM, external fetal monitors applied. Admission assessment completed. Patient and support persons educated on labor process. Patient instructed to report change in fetal movement, contractions, vaginal leaking of fluid or bleeding, abdominal pain, or any concerns related to the pregnancy to her nurse/physician. Patient oriented to room, call light in reach.   Response: Lydia Joe informed of . Plan per provider is oral cytotec. Patient verbalized understanding of education and verbalized agreement with plan.

## 2025-07-25 NOTE — PROGRESS NOTES
CNM PROGRESS NOTE    SUBJECTIVE:    Madelin was able to get some rest overnight. Feeling well this morning. Ramonita and his mom are supportive at bedside. Madelin is feeling a little bit of low pelvic cramping on and off.    OBJECTIVE:  /64 (BP Location: Left arm, Patient Position: Semi-Bryant's, Cuff Size: Adult Large)   Temp 98  F (36.7  C) (Temporal)   Resp 18   Wt 116.6 kg (257 lb)   LMP 10/08/2024   Breastfeeding Yes   BMI 44.11 kg/m      Fetal heart tones:   Baseline 135   Variability: moderate  Accelerations: present  Decelerations: absent    Contractions: Pt is marce every 1-7 minutes, lasting 40-60 seconds and palpates mild    Cervix: 0.5/ 20%/ -3, mid position, average consistency; pt experiencing significant pain with checks  ROM: not ruptured    Pitocin- none  Antibiotics- none  Cervical ripening: misoprostol - PO 7/12 doses given    ASSESSMENT:  20 year old  with acevedo IUP 39w6d for induction of labor; indication BMI > 40  EFM: Category 1  MORRIS 1  Cervical ripening with misoprostol PO  Continuous EFM  GBS negative and membranes intact  BMI > 40    PLAN:   Continue routine CNM care  Viscous lidocaine ordered for SVE due to pt intolerance  Continue CR with PO cytotec   Pain medication per pt preference  Continuous EFM  Activity ad lay; encourage balance of activity and rest  Regular diet; recommend oral nutrition and hydration    Anticipate   Plan AMTSL    Plan to reassess in 6-12 hours or earlier as clinically indicated    ADALGISA Michael CNM

## 2025-07-25 NOTE — PROGRESS NOTES
CNM Labor Admission History & Physical    Madelin Spain is a 20 year old  with an IUP at 39w6d  -American; partnered  Partner/support Person: Ramonita  Language Barrier: English  [facility]  Provider: CNM's    Madelin Spain is admitted to the [facility] on 2025 at 7:54 PM     History of present inllness/Chief Complaint:  Madelin is here today for an IOL. Ramonita and both grandmothers are present and supportive. She was able to rest for most of the day and is feeling ready to start the process. She is hoping for KJ to be born tomorrow on his due date.    Here with: induction of labor secondary to BMI > 40  Patient reports contractions are None      Baby active Yes  Membranes are intact  Bloody show No   Any changes with medical history since last prenatal visit No    Obstetrical history  Estimated Date of Delivery: 2025 determined by US  Patient's last menstrual period was 10/08/2024.   Dating U/S: 2024    Fetal anatomic survey: Abnormal: MFM level 2-bilateral mild urinary tract dilation/bilateral CPC - resolved at 32 weeks  Placenta: Anterior    PRENATAL COURSE  Prenatal care began at 10 wks gestation for a total of 14 prenatal visits.  Total wt gain 35 lb; There is no height or weight on file to calculate BMI.  Prenatal Blood Pressure: WNL  Tdap: 2025  Rhogam: N/A; O+  Prenatal course was complicated by   Patient Active Problem List   Diagnosis    Menorrhagia with regular cycle    Dysmenorrhea    Hypercholesteremia    Supervision of high risk pregnancy in third trimester    BMI 37.0-37.9, adult    Obesity affecting pregnancy in third trimester    Pyelectasis of fetus on prenatal ultrasound    Choroid plexus cyst of fetus in acevedo pregnancy    Indication for care in labor and delivery, antepartum     HISTORY  No Known Allergies  Past Medical History:   Diagnosis Date    High cholesterol     Menorrhagia      Past Surgical  History:   Procedure Laterality Date    INSERT CONTRACEPTIVE IMPLANT - NEXPLANON/IMPLANON  2023     Family History   Problem Relation Age of Onset    Family History Negative Mother     Coronary Artery Disease Father     Hypertension Father     Hypertension Maternal Grandmother     Breast Cancer Maternal Aunt      Social History     Tobacco Use    Smoking status: Never     Passive exposure: Yes    Smokeless tobacco: Never   Substance Use Topics    Alcohol use: No     OB History    Para Term  AB Living   1 0 0 0 0 0   SAB IAB Ectopic Multiple Live Births   0 0 0 0 0      # Outcome Date GA Lbr Fernandez/2nd Weight Sex Type Anes PTL Lv   1 Current                LABS:  Lab Results   Component Value Date    AS Negative 2024    HGB 11.5 (L) 2025    HEPBANG Nonreactive 2024    CHPCRT Negative 2025    GCPCRT Negative 2025     GBS Status: Negative  Rubella: Immune    HIV: Non-Reactive   Platelets: 289  1hr GCT: passed; 89    ROS   Pt is alert and oriented  Pt denies significant constitutional symptoms including fever and/or malaise.    Pt denies significant respiratory, cardiovacular, GI, or muscular/skeletal complaints.    Neuro: Denies HA and visual changes  Muscoloskeletal: Denies except for discomforts r/t pregnancy     PHYSICAL EXAM:  /74   Temp 97.6  F (36.4  C) (Temporal)   Resp 16   LMP 10/08/2024   General appearance:  healthy, alert, active, no distress, and smiling   Heart: RRR  Lungs: CTA bilaterally, normal respiratory effort  Abdomen: gravid, single vertex fetus per  US and Leopold's, non-tender, EFW 8 lbs.   Legs: trace edema     Contractions: Pt is not marce    Fetal heart tones: Baseline 135   Variability: moderate   Accelerations: present  Decelerations: absent    EFM: Category 1    Cervix: 0.5 / 0 / Posterior / -4, Vtx per RN  Bloody show: no  Membranes: intact    Garcia Score:  Dilation of Cervix:  0        Score = 0  Effacement:  0-30%;    Score = 0  Consistency:  Average;   Score = 1  Position:  Posterior;   Score = 0  Station:  -3;  Score = 0  Total Morris Score:  1    ASSESSMENT:  20 year old  with acevedo IUP 39w6d for induction of labor; indication BMI > 40  EFM: Category 1  MORRIS 1  Cervical ripening with misoprostol PO  Continuous EFM  GBS negative and membranes intact  BMI > 40    PLAN:  Routine CNM care  Admit - see IP orders  Labs ordered: CBC, syphilis screening, and type and screen  Teaching done r/t comfort measures, pain management options, induction and labor processes  Cervical ripening with misoprostol PO per pt preference, options discussed in clinic  Activity ad lay; recommend prioritizing rest tonight in preparation for labor  Regular diet; recommend oral nutrition and hydration  Pain medication per pt request - planning for unmedicated  Theraputic sleep per pt request; orders placed for hydroxyzine and morphine  Code status discussed, desire Full Code: YES    Plan to reassess in AM or earlier as clinically indicated.    Christy Dubon, Student Nurse Midwife    I was present with the student who participated in the service and in the documentation of the note. I have verified the history and personally performed the physical exam and medical decision-making. I agree with the assessment and plan of care as documented in the note.    ADALGISA Michael, JOSE GUADALUPE

## 2025-07-26 PROCEDURE — 120N000013 HC R&B IMCU

## 2025-07-26 PROCEDURE — 250N000009 HC RX 250

## 2025-07-26 PROCEDURE — 250N000013 HC RX MED GY IP 250 OP 250 PS 637: Performed by: NURSE PRACTITIONER

## 2025-07-26 PROCEDURE — 59200 INSERT CERVICAL DILATOR: CPT | Performed by: ADVANCED PRACTICE MIDWIFE

## 2025-07-26 PROCEDURE — 99231 SBSQ HOSP IP/OBS SF/LOW 25: CPT | Mod: 25 | Performed by: ADVANCED PRACTICE MIDWIFE

## 2025-07-26 RX ORDER — MISOPROSTOL 100 UG/1
25 TABLET ORAL EVERY 4 HOURS
Status: DISPENSED | OUTPATIENT
Start: 2025-07-26 | End: 2025-07-27

## 2025-07-26 RX ADMIN — MISOPROSTOL 25 MCG: 100 TABLET ORAL at 13:22

## 2025-07-26 RX ADMIN — MISOPROSTOL 25 MCG: 100 TABLET ORAL at 09:04

## 2025-07-26 RX ADMIN — MISOPROSTOL 25 MCG: 100 TABLET ORAL at 17:26

## 2025-07-26 RX ADMIN — MISOPROSTOL 25 MCG: 100 TABLET ORAL at 21:50

## 2025-07-26 RX ADMIN — LIDOCAINE HYDROCHLORIDE: 20 JELLY TOPICAL at 07:40

## 2025-07-26 ASSESSMENT — ACTIVITIES OF DAILY LIVING (ADL)
ADLS_ACUITY_SCORE: 17
ADLS_ACUITY_SCORE: 15
ADLS_ACUITY_SCORE: 17
ADLS_ACUITY_SCORE: 15
ADLS_ACUITY_SCORE: 17
ADLS_ACUITY_SCORE: 15
ADLS_ACUITY_SCORE: 15
ADLS_ACUITY_SCORE: 17
ADLS_ACUITY_SCORE: 15
ADLS_ACUITY_SCORE: 17
ADLS_ACUITY_SCORE: 15
ADLS_ACUITY_SCORE: 17
ADLS_ACUITY_SCORE: 15
ADLS_ACUITY_SCORE: 17
ADLS_ACUITY_SCORE: 15

## 2025-07-26 NOTE — PLAN OF CARE
Madelin reports increased discomfort with her contractions. She is tearful and appears confused about what's happening. We discussed the labor process and her pain management options, including nitrous, fentanyl and epidural, again at length. She will be due for another dose of vaginal cytotec at 2130. Lorelei Avila CNM remains in dept.     She is up independently in the room. She showered, bed linens were changed, IV dressing was replaced and she is tolerating food and fluids and voiding without difficulty. Plan of care is unchanged at this time.

## 2025-07-26 NOTE — PLAN OF CARE
Christie patrches removed and external EFM/toco applied to rest the skin. Skin is intact and no redness is observed.    Vaginal cytoctec placed with pts consent without difficulty using tablet applicator. No VB or LOF noted on exam.     Fht's 140 with moderate variability, accels present and no decels. Presumed adequate fetal oxygenation at this time.     Pharmacological and non-pharmacological pain management strategies reviewed with Madelin and her family and questions answered. Plan is bedrest x2 hours post placement and then shower and complete a labor circuit activity.

## 2025-07-26 NOTE — PROGRESS NOTES
CNM PROGRESS NOTE    SUBJECTIVE:  Madelin is sitting up in bed, about to eat breakfast. A little bit tearful, reports discomfort in lower abdomen and vagina since first dose of vaginal cytotec was given (at 0930). Ramonita and Madelin's mother are present at the bedside for support     OBJECTIVE:  /61 (BP Location: Left arm, Patient Position: Semi-Bryant's, Cuff Size: Adult Large)   Temp 97.7  F (36.5  C) (Temporal)   Resp 15   Wt 116.6 kg (257 lb)   LMP 10/08/2024   Breastfeeding Yes   BMI 44.11 kg/m      Fetal heart tones: Baseline 130   Variability: moderate  Accelerations: present  Decelerations: absent    Contractions: Pt is marce every 4-6 minutes, lasting 40-80 seconds and palpates mild    Cervix: deferred   ROM: not ruptured    Pitocin- none  Antibiotics- none  Cervical ripening: s/p 11 dose oral cytotec, cervidil x12 hrs and 1 dose vaginal cytotec     ASSESSMENT:  IUP @ 40w1d cervical ripening for BMI >40  Garcia score 5 (per last SVE)  Category I EFM  GBS- negative  Membranes intact     PLAN:   Continue cervical ripening with vaginal misoprostol up to 24 hrs.   Pain medication - aware of options  Plan for SVE prior to next dose of vaginal cytotec    ADALGISA Flores, JOSE GUADALUPE

## 2025-07-26 NOTE — PROGRESS NOTES
CNM PROGRESS NOTE    SUBJECTIVE:  Madelin is overall comfortable, was up in room moving around, using cub. Initiallly wanted SVE prior to next dose of vaginal cytotec but now would just placement and to do a check with 1730 dose.     OBJECTIVE:  /62 (BP Location: Left arm, Patient Position: Fowlers, Cuff Size: Adult Large)   Temp 97.5  F (36.4  C) (Temporal)   Resp 18   Wt 116.6 kg (257 lb)   LMP 10/08/2024   Breastfeeding Yes   BMI 44.11 kg/m      Fetal heart tones: Baseline 145  Variability: moderate  Accelerations: present  Decelerations: absent    Contractions: Pt is marce every 4-7 minutes, lasting 60-80 seconds and palpates moderate    Cervix: deferred  ROM: not ruptured    Pitocin- none  Antibiotics- none  Cervical ripening:  s/p 11 dose oral cytotec, cervidil x12 hrs and 1 dose vaginal cytotec     ASSESSMENT:  IUP @ 40w1d cervical ripening for BMI >40  Garcia score 5 (per last SVE)  Category I EFM  GBS- negative  Membranes intact     PLAN:   2nd dose vaginal cytotec placed by CNM  Encouraged ambulation and upright/change in positioning   Pain medication- aware of option. Coping well at this time   Reevaluate at 1730 prior to next dose vaginal cytotec     ADALGISA Flores, JOSE GUADALUPE

## 2025-07-26 NOTE — PROGRESS NOTES
CNM PROGRESS NOTE    SUBJECTIVE: Madelin is resting in bed, just woke up from a nap. Kwabenadeya is present and supportive at the bedside. She was hoping that things would be progressing quicker, wondering about next options.    OBJECTIVE:  /58 (BP Location: Left arm, Patient Position: Right side, Cuff Size: Adult Large)   Temp 97.5  F (36.4  C) (Temporal)   Resp 18   Wt 116.6 kg (257 lb)   LMP 10/08/2024   Breastfeeding Yes   BMI 44.11 kg/m      Fetal heart tones: Baseline 140   Variability: moderate  Accelerations: present  Decelerations: absent    Contractions: Pt is marce every 2-6.5 minutes, lasting 50-70 seconds and palpates mild    Cervix: 1 / 50% / -3, Vtx  ROM: not ruptured    Cervical ripening: received 11 doses PO misoprostol; cervidil placed at 1900    ASSESSMENT:  20 year old  with acevedo IUP 39w6d for induction of labor  EFM: Category 1  Cervical ripening with cervidil  Continuous EFM  GBS negative and membranes intact  BMI > 40    PLAN:   Discussed cervical change made and recommendation for continued cervical ripening, discussed options, Madelin agrees to cervidil placement now after talking through placement process.  Continue cervical ripening with cervidil  Pain medication per patient request; planning for unmedicated, discussed options  Activity ad lay; encouraged balance of activity and rest  Regular diet; recommended oral nutrition and hydration  Recommended therapeutic sleep with hydroxyzine and morphine  Reevaluate in AM or sooner if clinically indicated    Christy Dubon, Student Nurse Midwife    I was present with the student who participated in the service and in the documentation of the note. I have verified the history and personally performed the physical exam and medical decision-making. I agree with the assessment and plan of care as documented in the note.    Ronda DIANA, JOSE GUADALUPE, [facility]  [phone]

## 2025-07-26 NOTE — PROGRESS NOTES
CNM PROGRESS NOTE    SUBJECTIVE: Madelin reports having gotten some rest last night. She continues to experience vaginal cramping that she needs to breathe through. She found the essential oil patches to be helpful!    OBJECTIVE:  /65   Temp 97.5  F (36.4  C) (Temporal)   Resp 16   Wt 116.6 kg (257 lb)   LMP 10/08/2024   Breastfeeding Yes   BMI 44.11 kg/m      Fetal heart tones: Baseline 130   Variability: moderate  Accelerations: present  Decelerations: absent    Contractions: Pt is marce in an irregular pattern    Cervix: 1.5 / 60-70% / -3, Vtx per RN  ROM: not ruptured    Cervical ripening: PO misoprostol x11 doses, cervidil x12 hrs, begin vaginal misoprostol now    ASSESSMENT:  IUP @ 40w0d cervical ripening; vaginal misoprostol  EFM: Category 1  GBS negative  Membranes intact  BMI > 40  Unable to locate cervidil, presumably fell out in bathroom just prior to check  Garcia 5     PLAN:   Reassured that slow progress continues to be made. Discussed further cervical ripening options including vaginal misoprostol, wilson balloon and low dose Pitocin. Decided to transition to cervical ripening with vaginal misoprostol via shared decision making with Madelin.  Pain medication per patient request; planning for unmedicated  Activity ad lay; encouraged activity alternated with rest  Regular diet; encouraged oral nutrition and hydration  Will plan for SVE after dose 2 or 3 of cyotec  Will plan to transition to pitocin augmentation if Garcia score >=6  Reevaluate as clinically indicated    Christy Dubon, Student Nurse Midwife    I was present with the student who participated in the service and in the documentation of the note. I have verified the history and personally performed the physical exam and medical decision-making. I agree with the assessment and plan of care as documented in the note.    Ronda DIANA, JOSE GUADALUPE, [facility]  [phone]

## 2025-07-26 NOTE — PROGRESS NOTES
CNM PROGRESS NOTE    SUBJECTIVE:  Madelin is sleeping soundly. Was up in room earlier, showering. Will have periods of time where contractions feel stronger, then periods of time where she is not feeling much of anything. Ramonita is present at the bedside     OBJECTIVE:  /70 (BP Location: Left arm, Patient Position: Other (comments), Cuff Size: Adult Large)   Temp 97  F (36.1  C) (Temporal)   Resp 15   Wt 116.6 kg (257 lb)   LMP 10/08/2024   Breastfeeding Yes   BMI 44.11 kg/m      Fetal heart tones: Baseline 140   Variability: moderate  Accelerations: present  Decelerations: absent    Contractions: Pt is marce every 2.5-6.5 minutes, lasting 60-80 seconds and palpates mild    Cervix: 1.5/ 60%/ -3, Vtx, bloody show with check   ROM: not ruptured    Pitocin- none  Antibiotics- none  Cervical ripening: s/p 11 dose oral cytotec, cervidil x12 hrs and 2 dose vaginal cytotec     ASSESSMENT:  IUP @ 40w1d cervical ripening for BMI >40  Garcia score 5   Category I EFM  GBS- negative  Membranes intact     PLAN:   3rd dose vaginal cytotec placed by CNM. Will continue with vaginal cytotec for up to 24 hrs or until Garcia score >=6. Will transition to pitocin once ripening is achieved   Encouraged ambulation and movement  Pain medication - aware of options   Reevaluate as clinically indicated     ADALGISA Flores, JOSE GUADALUPE

## 2025-07-26 NOTE — PROGRESS NOTES
CNM PROGRESS NOTE    SUBJECTIVE:  Madelin is sitting up in bed. She continues to feel cramping, reports it as a general persistent discomfort. She is coping by rocking back and forth and deep breathing. Ramonita and her mother are present and supportive. Continues to ask good questions about the induction and labor process.    OBJECTIVE:  /64   Temp 97  F (36.1  C) (Axillary)   Resp 20   Wt 116.6 kg (257 lb)   LMP 10/08/2024   Breastfeeding Yes   BMI 44.11 kg/m      Fetal heart tones: Baseline 135   Variability: moderate  Accelerations: present  Decelerations: absent    Contractions: Pt is marce in an irregular pattern    ROM: not ruptured    Cervical ripening: cervidil; placed at 1900    ASSESSMENT:  IUP @ 40w0d cervical ripening   EFM: Category 1  Cervical ripening cervidil  Continuous EFM  GBS negative and membranes intact  BMI > 40     PLAN:   Further discussion regarding the induction and labor process; discussed continued cervical ripening overnight in anticipation of moving into the induction/labor phase in the AM via expectant management, Pitocin and/or amniotomy.  Cervical ripening with cervidil until 0700 or active labor  Pain medication per patient request; planning for unmedicated  Recommended therapeutic sleep with hydroxyzine and/or morphine, discussed in detail; Madelin declines at this time  Reevaluate in AM or sooner if clinically indicated    Christy Dubon, Student Nurse Midwife    I was present with the student who participated in the service and in the documentation of the note. I have verified the history and personally performed the physical exam and medical decision-making. I agree with the assessment and plan of care as documented in the note.    Ronda DIANA, JOSE GUADALUPE, [facility]  [phone]

## 2025-07-26 NOTE — PLAN OF CARE
Pt able to rest overnight. FOB, mother, and FOB's mother at bedside.   Reports some cramping and feeling uncomfortable at times. Coping via shower and position changes. Denies loss of fluid. Denies headache, vision changes, and/or epigastric pain. Cervidil in place until approx 0700.   Pt and family were educated on pharmacological pain management options including nitrous oxide, IV Fentanyl, and labor epidural. Pt declines any pain management at this time; but verbalizes understanding of availability.     Expecting a boy, plans to breastfeed, on call [facility].

## 2025-07-27 LAB
ABO + RH BLD: NORMAL
BLD GP AB SCN SERPL QL: NEGATIVE
SPECIMEN EXP DATE BLD: NORMAL

## 2025-07-27 PROCEDURE — 3E033VJ INTRODUCTION OF OTHER HORMONE INTO PERIPHERAL VEIN, PERCUTANEOUS APPROACH: ICD-10-PCS | Performed by: NURSE PRACTITIONER

## 2025-07-27 PROCEDURE — 36415 COLL VENOUS BLD VENIPUNCTURE: CPT | Performed by: NURSE PRACTITIONER

## 2025-07-27 PROCEDURE — 258N000003 HC RX IP 258 OP 636

## 2025-07-27 PROCEDURE — 99231 SBSQ HOSP IP/OBS SF/LOW 25: CPT | Performed by: NURSE PRACTITIONER

## 2025-07-27 PROCEDURE — 120N000013 HC R&B IMCU

## 2025-07-27 PROCEDURE — 86900 BLOOD TYPING SEROLOGIC ABO: CPT | Performed by: NURSE PRACTITIONER

## 2025-07-27 PROCEDURE — 250N000013 HC RX MED GY IP 250 OP 250 PS 637: Performed by: NURSE PRACTITIONER

## 2025-07-27 PROCEDURE — 10907ZC DRAINAGE OF AMNIOTIC FLUID, THERAPEUTIC FROM PRODUCTS OF CONCEPTION, VIA NATURAL OR ARTIFICIAL OPENING: ICD-10-PCS | Performed by: NURSE PRACTITIONER

## 2025-07-27 PROCEDURE — 250N000009 HC RX 250: Performed by: ADVANCED PRACTICE MIDWIFE

## 2025-07-27 RX ORDER — OXYTOCIN/0.9 % SODIUM CHLORIDE 30/500 ML
1-24 PLASTIC BAG, INJECTION (ML) INTRAVENOUS CONTINUOUS
Status: DISCONTINUED | OUTPATIENT
Start: 2025-07-27 | End: 2025-07-28 | Stop reason: HOSPADM

## 2025-07-27 RX ORDER — SODIUM CHLORIDE, SODIUM LACTATE, POTASSIUM CHLORIDE, CALCIUM CHLORIDE 600; 310; 30; 20 MG/100ML; MG/100ML; MG/100ML; MG/100ML
INJECTION, SOLUTION INTRAVENOUS CONTINUOUS PRN
Status: DISCONTINUED | OUTPATIENT
Start: 2025-07-27 | End: 2025-07-28 | Stop reason: HOSPADM

## 2025-07-27 RX ORDER — LIDOCAINE 40 MG/G
CREAM TOPICAL
Status: DISCONTINUED | OUTPATIENT
Start: 2025-07-27 | End: 2025-07-28 | Stop reason: HOSPADM

## 2025-07-27 RX ADMIN — Medication 2 MILLI-UNITS/MIN: at 07:00

## 2025-07-27 RX ADMIN — MISOPROSTOL 25 MCG: 100 TABLET ORAL at 01:48

## 2025-07-27 RX ADMIN — SODIUM CHLORIDE, SODIUM LACTATE, POTASSIUM CHLORIDE, AND CALCIUM CHLORIDE: .6; .31; .03; .02 INJECTION, SOLUTION INTRAVENOUS at 06:59

## 2025-07-27 RX ADMIN — SODIUM CHLORIDE, SODIUM LACTATE, POTASSIUM CHLORIDE, AND CALCIUM CHLORIDE: .6; .31; .03; .02 INJECTION, SOLUTION INTRAVENOUS at 16:47

## 2025-07-27 ASSESSMENT — ACTIVITIES OF DAILY LIVING (ADL)
ADLS_ACUITY_SCORE: 17

## 2025-07-27 NOTE — PROGRESS NOTES
CNM PROGRESS NOTE    SUBJECTIVE:  Madelin is up in bed on the cub, contractions are inconsistent in their intensity, some are much stronger than others and then will have times when she is able to sleep through contractions.     OBJECTIVE:  /63   Temp 97  F (36.1  C) (Temporal)   Resp 16   Wt 116.6 kg (257 lb)   LMP 10/08/2024   Breastfeeding Yes   BMI 44.11 kg/m      Fetal heart tones: Baseline 135  Variability: moderate   Accelerations: present  Decelerations: absent    Contractions: Pt is marce every 2-7 minutes, lasting 40-80 seconds and palpates mild    Cervix: 1.5/ 70-80% / -3/soft/anterior  ROM: not ruptured    Pitocin- none  Antibiotics- none  Cervical ripening: s/p 11 dose oral cytotec, cervidil x12 hrs and 5 doses vaginal cytotec     ASSESSMENT:  IUP @ 40w2d cervical ripening for BMI >40  Garcia score 7   Cervical ripening complete   Category I EFM  GBS- negative  Membranes intact     PLAN:   Cervical ripening achieved. Will move forward with pitocin, orders placed. Discussed next step is AROM and that likely the combination of pitocin and AROM will bring us the most benefits in terms of adequate cervical dilation and labor progress   Pain medication - options discussed, hoping to go unmedicated   Reevaluate as clinically indicated    ADALGISA Flores, JUAN ANTONIOM

## 2025-07-27 NOTE — PLAN OF CARE
Goal Outcome Evaluation:      Plan of Care Reviewed With: patient, parent, significant other    Overall Patient Progress: no changeOverall Patient Progress: no change     Pt received 2 doses of vag cytotec overnight. Pt able to sleep overnight, marce occasionally and coping well with hot packs. Fetal tracing has been category 1 overnight. At 0600, SVE 1/70-80/-3, anterior. Discussed with CNM and orders received to stop cytotec and start pitocin.     Discussed plan of care with pt, pitocin started per orders with verbal consent from pt.   Report given to Kaylan CORNELL RN to assume care

## 2025-07-27 NOTE — PROGRESS NOTES
JUAN ANTONIOM PROGRESS NOTE    SUBJECTIVE:  Madelin reports she is feeling some cramping on and off, was able to sleep overnight.  Kwabena and KRISTIE in room providing support PRN.     OBJECTIVE:  BP 98/64 (BP Location: Left arm, Patient Position: Semi-Bryant's, Cuff Size: Adult Large)   Temp 97.7  F (36.5  C) (Temporal)   Resp 18   Wt 116.6 kg (257 lb)   LMP 10/08/2024   Breastfeeding Yes   BMI 44.11 kg/m      Fetal heart tones: Baseline 135   Variability: moderate  Accelerations: present  Decelerations: absent    Contractions: Pt is marce every 1-3 minutes, lasting  seconds and palpates moderate    Cervix: 1.5/ 80%/ -2, Vtx, fetal sutures palpated, non-ballotable and well applied to cervix  ROM: AROM with amniotomy attempted, unsure if ruptured, small amount of clear fluid vs mucus    Pitocin- 8 mu/min.  Antibiotics- none  Cervical ripening:  s/p 11 dose oral cytotec, cervidil x12 hrs and 5 doses vaginal cytotec     PROCEDURE: AROM with amniotomy at 1230 after discussion of R/B/A and consent received.  Scant amount of clear fluid vs mucus on glove after amniotomy.     ASSESSMENT:  IUP @ 40w2d induction of labor for BMI > 40   GBS- negative  Category I EFM  ROM: scant amount of clear fluid    PLAN:   Encouraged upright positioning, ambulation  Pain medication: is aware of options, hopes to go unmedicated  Labor induction with Pitocin, will titrate per protocol  Reevaluate as clinically indicated    ADALGISA Ty CNM

## 2025-07-28 ENCOUNTER — ANESTHESIA (OUTPATIENT)
Dept: OBGYN | Facility: CLINIC | Age: 20
End: 2025-07-28
Payer: COMMERCIAL

## 2025-07-28 ENCOUNTER — ANESTHESIA EVENT (OUTPATIENT)
Dept: OBGYN | Facility: CLINIC | Age: 20
End: 2025-07-28
Payer: COMMERCIAL

## 2025-07-28 PROBLEM — O13.3 GESTATIONAL HYPERTENSION, THIRD TRIMESTER: Status: ACTIVE | Noted: 2025-07-28

## 2025-07-28 LAB
ALBUMIN SERPL BCG-MCNC: 3.4 G/DL (ref 3.5–5.2)
ALP SERPL-CCNC: 206 U/L (ref 40–150)
ALT SERPL W P-5'-P-CCNC: 26 U/L (ref 0–50)
ANION GAP SERPL CALCULATED.3IONS-SCNC: 18 MMOL/L (ref 7–15)
AST SERPL W P-5'-P-CCNC: 33 U/L (ref 0–45)
BILIRUB SERPL-MCNC: 0.3 MG/DL
BUN SERPL-MCNC: 8.5 MG/DL (ref 6–20)
CALCIUM SERPL-MCNC: 9 MG/DL (ref 8.8–10.4)
CHLORIDE SERPL-SCNC: 100 MMOL/L (ref 98–107)
CREAT SERPL-MCNC: 0.84 MG/DL (ref 0.51–0.95)
EGFRCR SERPLBLD CKD-EPI 2021: >90 ML/MIN/1.73M2
ERYTHROCYTE [DISTWIDTH] IN BLOOD BY AUTOMATED COUNT: 13.6 % (ref 10–15)
GLUCOSE SERPL-MCNC: 100 MG/DL (ref 70–99)
HCO3 SERPL-SCNC: 18 MMOL/L (ref 22–29)
HCT VFR BLD AUTO: 35.1 % (ref 35–47)
HGB BLD-MCNC: 12 G/DL (ref 11.7–15.7)
MCH RBC QN AUTO: 31.1 PG (ref 26.5–33)
MCHC RBC AUTO-ENTMCNC: 34.2 G/DL (ref 31.5–36.5)
MCV RBC AUTO: 91 FL (ref 78–100)
PLATELET # BLD AUTO: 265 10E3/UL (ref 150–450)
POTASSIUM SERPL-SCNC: 4 MMOL/L (ref 3.4–5.3)
PROT SERPL-MCNC: 6.6 G/DL (ref 6.4–8.3)
RBC # BLD AUTO: 3.86 10E6/UL (ref 3.8–5.2)
SODIUM SERPL-SCNC: 136 MMOL/L (ref 135–145)
WBC # BLD AUTO: 19.7 10E3/UL (ref 4–11)

## 2025-07-28 PROCEDURE — 59410 OBSTETRICAL CARE: CPT | Performed by: ADVANCED PRACTICE MIDWIFE

## 2025-07-28 PROCEDURE — 250N000011 HC RX IP 250 OP 636

## 2025-07-28 PROCEDURE — 00HU33Z INSERTION OF INFUSION DEVICE INTO SPINAL CANAL, PERCUTANEOUS APPROACH: ICD-10-PCS | Performed by: STUDENT IN AN ORGANIZED HEALTH CARE EDUCATION/TRAINING PROGRAM

## 2025-07-28 PROCEDURE — 370N000003 HC ANESTHESIA WARD SERVICE: Performed by: STUDENT IN AN ORGANIZED HEALTH CARE EDUCATION/TRAINING PROGRAM

## 2025-07-28 PROCEDURE — 80053 COMPREHEN METABOLIC PANEL: CPT | Performed by: NURSE PRACTITIONER

## 2025-07-28 PROCEDURE — 36415 COLL VENOUS BLD VENIPUNCTURE: CPT | Performed by: NURSE PRACTITIONER

## 2025-07-28 PROCEDURE — 250N000013 HC RX MED GY IP 250 OP 250 PS 637: Performed by: ADVANCED PRACTICE MIDWIFE

## 2025-07-28 PROCEDURE — 120N000012 HC R&B POSTPARTUM

## 2025-07-28 PROCEDURE — 88307 TISSUE EXAM BY PATHOLOGIST: CPT | Mod: TC | Performed by: ADVANCED PRACTICE MIDWIFE

## 2025-07-28 PROCEDURE — 722N000001 HC LABOR CARE VAGINAL DELIVERY SINGLE

## 2025-07-28 PROCEDURE — 250N000013 HC RX MED GY IP 250 OP 250 PS 637

## 2025-07-28 PROCEDURE — 85014 HEMATOCRIT: CPT | Performed by: NURSE PRACTITIONER

## 2025-07-28 PROCEDURE — 258N000003 HC RX IP 258 OP 636

## 2025-07-28 PROCEDURE — 88307 TISSUE EXAM BY PATHOLOGIST: CPT | Mod: 26 | Performed by: PATHOLOGY

## 2025-07-28 PROCEDURE — 250N000011 HC RX IP 250 OP 636: Performed by: STUDENT IN AN ORGANIZED HEALTH CARE EDUCATION/TRAINING PROGRAM

## 2025-07-28 PROCEDURE — 0KQM0ZZ REPAIR PERINEUM MUSCLE, OPEN APPROACH: ICD-10-PCS | Performed by: ADVANCED PRACTICE MIDWIFE

## 2025-07-28 PROCEDURE — 3E0R3BZ INTRODUCTION OF ANESTHETIC AGENT INTO SPINAL CANAL, PERCUTANEOUS APPROACH: ICD-10-PCS | Performed by: STUDENT IN AN ORGANIZED HEALTH CARE EDUCATION/TRAINING PROGRAM

## 2025-07-28 RX ORDER — METHYLERGONOVINE MALEATE 0.2 MG/ML
200 INJECTION INTRAVENOUS
Status: DISCONTINUED | OUTPATIENT
Start: 2025-07-28 | End: 2025-07-30 | Stop reason: HOSPADM

## 2025-07-28 RX ORDER — MISOPROSTOL 200 UG/1
400 TABLET ORAL
Status: DISCONTINUED | OUTPATIENT
Start: 2025-07-28 | End: 2025-07-30 | Stop reason: HOSPADM

## 2025-07-28 RX ORDER — TRANEXAMIC ACID 10 MG/ML
1 INJECTION, SOLUTION INTRAVENOUS EVERY 30 MIN PRN
Status: DISCONTINUED | OUTPATIENT
Start: 2025-07-28 | End: 2025-07-30 | Stop reason: HOSPADM

## 2025-07-28 RX ORDER — ROPIVACAINE HYDROCHLORIDE 2 MG/ML
10 INJECTION, SOLUTION EPIDURAL; INFILTRATION; PERINEURAL ONCE
Status: DISCONTINUED | OUTPATIENT
Start: 2025-07-28 | End: 2025-07-28 | Stop reason: HOSPADM

## 2025-07-28 RX ORDER — BISACODYL 10 MG
10 SUPPOSITORY, RECTAL RECTAL DAILY PRN
Status: DISCONTINUED | OUTPATIENT
Start: 2025-07-28 | End: 2025-07-30 | Stop reason: HOSPADM

## 2025-07-28 RX ORDER — OXYTOCIN 10 [USP'U]/ML
10 INJECTION, SOLUTION INTRAMUSCULAR; INTRAVENOUS
Status: DISCONTINUED | OUTPATIENT
Start: 2025-07-28 | End: 2025-07-30 | Stop reason: HOSPADM

## 2025-07-28 RX ORDER — HYDROCORTISONE 25 MG/G
CREAM TOPICAL 3 TIMES DAILY PRN
Status: DISCONTINUED | OUTPATIENT
Start: 2025-07-28 | End: 2025-07-30 | Stop reason: HOSPADM

## 2025-07-28 RX ORDER — EPHEDRINE SULFATE 50 MG/ML
5 INJECTION, SOLUTION INTRAMUSCULAR; INTRAVENOUS; SUBCUTANEOUS
Status: DISCONTINUED | OUTPATIENT
Start: 2025-07-28 | End: 2025-07-28 | Stop reason: HOSPADM

## 2025-07-28 RX ORDER — POLYETHYLENE GLYCOL 3350 17 G/17G
17 POWDER, FOR SOLUTION ORAL DAILY PRN
Status: DISCONTINUED | OUTPATIENT
Start: 2025-07-28 | End: 2025-07-30 | Stop reason: HOSPADM

## 2025-07-28 RX ORDER — MISOPROSTOL 200 UG/1
800 TABLET ORAL
Status: DISCONTINUED | OUTPATIENT
Start: 2025-07-28 | End: 2025-07-30 | Stop reason: HOSPADM

## 2025-07-28 RX ORDER — NALBUPHINE HYDROCHLORIDE 10 MG/ML
2.5-5 INJECTION INTRAMUSCULAR; INTRAVENOUS; SUBCUTANEOUS EVERY 6 HOURS PRN
Status: DISCONTINUED | OUTPATIENT
Start: 2025-07-28 | End: 2025-07-30 | Stop reason: HOSPADM

## 2025-07-28 RX ORDER — ROPIVACAINE HYDROCHLORIDE 2 MG/ML
INJECTION, SOLUTION EPIDURAL; INFILTRATION; PERINEURAL
Status: COMPLETED | OUTPATIENT
Start: 2025-07-28 | End: 2025-07-28

## 2025-07-28 RX ORDER — OXYTOCIN/0.9 % SODIUM CHLORIDE 30/500 ML
340 PLASTIC BAG, INJECTION (ML) INTRAVENOUS CONTINUOUS PRN
Status: DISCONTINUED | OUTPATIENT
Start: 2025-07-28 | End: 2025-07-30 | Stop reason: HOSPADM

## 2025-07-28 RX ORDER — LOPERAMIDE HYDROCHLORIDE 2 MG/1
4 CAPSULE ORAL
Status: DISCONTINUED | OUTPATIENT
Start: 2025-07-28 | End: 2025-07-30 | Stop reason: HOSPADM

## 2025-07-28 RX ORDER — IBUPROFEN 400 MG/1
800 TABLET, FILM COATED ORAL EVERY 6 HOURS PRN
Status: DISCONTINUED | OUTPATIENT
Start: 2025-07-28 | End: 2025-07-30 | Stop reason: HOSPADM

## 2025-07-28 RX ORDER — AMOXICILLIN 250 MG
2 CAPSULE ORAL
Status: DISCONTINUED | OUTPATIENT
Start: 2025-07-28 | End: 2025-07-30 | Stop reason: HOSPADM

## 2025-07-28 RX ORDER — LOPERAMIDE HYDROCHLORIDE 2 MG/1
2 CAPSULE ORAL
Status: DISCONTINUED | OUTPATIENT
Start: 2025-07-28 | End: 2025-07-30 | Stop reason: HOSPADM

## 2025-07-28 RX ORDER — CARBOPROST TROMETHAMINE 250 UG/ML
250 INJECTION, SOLUTION INTRAMUSCULAR
Status: DISCONTINUED | OUTPATIENT
Start: 2025-07-28 | End: 2025-07-30 | Stop reason: HOSPADM

## 2025-07-28 RX ORDER — ACETAMINOPHEN 325 MG/1
650 TABLET ORAL EVERY 4 HOURS PRN
Status: DISCONTINUED | OUTPATIENT
Start: 2025-07-28 | End: 2025-07-30 | Stop reason: HOSPADM

## 2025-07-28 RX ADMIN — FENTANYL CITRATE 100 MCG: 50 INJECTION, SOLUTION INTRAMUSCULAR; INTRAVENOUS at 08:17

## 2025-07-28 RX ADMIN — FENTANYL CITRATE 100 MCG: 50 INJECTION, SOLUTION INTRAMUSCULAR; INTRAVENOUS at 06:55

## 2025-07-28 RX ADMIN — SODIUM CHLORIDE, SODIUM LACTATE, POTASSIUM CHLORIDE, AND CALCIUM CHLORIDE 500 ML: .6; .31; .03; .02 INJECTION, SOLUTION INTRAVENOUS at 09:22

## 2025-07-28 RX ADMIN — FENTANYL CITRATE 100 MCG: 50 INJECTION, SOLUTION INTRAMUSCULAR; INTRAVENOUS at 04:47

## 2025-07-28 RX ADMIN — ACETAMINOPHEN 650 MG: 325 TABLET ORAL at 23:56

## 2025-07-28 RX ADMIN — MISOPROSTOL 800 MCG: 200 TABLET ORAL at 14:36

## 2025-07-28 RX ADMIN — ROPIVACAINE HYDROCHLORIDE 10 ML: 2 INJECTION, SOLUTION EPIDURAL; INFILTRATION at 09:33

## 2025-07-28 RX ADMIN — Medication: at 09:23

## 2025-07-28 RX ADMIN — IBUPROFEN 800 MG: 400 TABLET ORAL at 23:56

## 2025-07-28 RX ADMIN — IBUPROFEN 800 MG: 400 TABLET ORAL at 16:52

## 2025-07-28 RX ADMIN — ONDANSETRON 4 MG: 2 INJECTION, SOLUTION INTRAMUSCULAR; INTRAVENOUS at 06:11

## 2025-07-28 RX ADMIN — ACETAMINOPHEN 650 MG: 325 TABLET ORAL at 16:52

## 2025-07-28 ASSESSMENT — ACTIVITIES OF DAILY LIVING (ADL)
ADLS_ACUITY_SCORE: 17

## 2025-07-28 NOTE — ANESTHESIA PREPROCEDURE EVALUATION
"Anesthesia Pre-Procedure Evaluation    Patient: Madelin Spain   MRN: [id number] : 2005          Procedure :          Past Medical History:   Diagnosis Date    High cholesterol     Menorrhagia       Past Surgical History:   Procedure Laterality Date    INSERT CONTRACEPTIVE IMPLANT - NEXPLANON/IMPLANON  2023      No Known Allergies   Social History     Tobacco Use    Smoking status: Never     Passive exposure: Yes    Smokeless tobacco: Never   Substance Use Topics    Alcohol use: No      Wt Readings from Last 1 Encounters:   25 116.6 kg (257 lb)        Anesthesia Evaluation            ROS/MED HX  ENT/Pulmonary:    (-) asthma   Neurologic:       Cardiovascular:    (-) PIH   METS/Exercise Tolerance:     Hematologic:    (-) anemia   Musculoskeletal:   (+)       kyphoscoliosis        GI/Hepatic:     (+) GERD,                   Renal/Genitourinary:       Endo:     (+)               Obesity,       Psychiatric/Substance Use:    (-) psychiatric history   Infectious Disease:       Malignancy:       Other:              Physical Exam  Airway  Mallampati: II  TM distance: > 3 FB  Neck ROM: full    Cardiovascular - normal exam   Dental - normal exam    Pulmonary - normal exam      Neurological   Other Findings       OUTSIDE LABS:  CBC:   Lab Results   Component Value Date    WBC 19.7 (H) 2025    WBC 13.6 (H) 2025    HGB 12.0 2025    HGB 11.5 (L) 2025    HCT 35.1 2025    HCT 33.8 (L) 2025     2025     2025     BMP:   Lab Results   Component Value Date     2025    POTASSIUM 4.0 2025    CHLORIDE 100 2025    CO2 18 (L) 2025    BUN 8.5 2025    CR 0.84 2025     (H) 2025    GLC 94 2022     COAGS: No results found for: \"PTT\", \"INR\", \"FIBR\"  POC:   Lab Results   Component Value Date    HCG Positive (A) 2024     HEPATIC:   Lab Results   Component Value Date    ALBUMIN 3.4 (L) 2025    " PROTTOTAL 6.6 07/28/2025    ALT 26 07/28/2025    AST 33 07/28/2025    ALKPHOS 206 (H) 07/28/2025    BILITOTAL 0.3 07/28/2025     OTHER:   Lab Results   Component Value Date    A1C 4.8 12/30/2024    JUAN JOSE 9.0 07/28/2025    TSH 1.14 07/25/2022       Anesthesia Plan    ASA Status:  3       Anesthesia Type: Epidural.        Consents    Anesthesia Plan(s) and associated risks, benefits, and realistic alternatives discussed. Questions answered and patient/representative(s) expressed understanding.     - Discussed:     - Discussed with:  Patient               Postoperative Care         Comments:                   Fara Patel MD    I have reviewed the pertinent notes and labs in the chart from the past 30 days and (re)examined the patient.  Any updates or changes from those notes are reflected in this note.    Clinically Significant Risk Factors               # Hypoalbuminemia: Lowest albumin = 3.4 g/dL at 7/28/2025 12:23 AM, will monitor as appropriate     # Hypertension: Noted on problem list

## 2025-07-28 NOTE — PROGRESS NOTES
CNM PROGRESS NOTE    SUBJECTIVE:  Madelin has been feeling stronger contractions, has used nitrous and IV Fentanyl for pain.  Ramonita and other family members are in room providing support PRN.      OBJECTIVE:  /75   Temp 98.2  F (36.8  C) (Oral)   Resp 20   Wt 116.6 kg (257 lb)   LMP 10/08/2024   Breastfeeding Yes   BMI 44.11 kg/m      Fetal heart tones: Baseline 120   Variability: moderate  Accelerations: present  Decelerations: absent    Contractions: Pt is marce every 2-4 minutes, lasting 40-70 seconds and palpates moderate    Cervix: 4.5/ 90% / -1, Vtx per RN exam at 0650  ROM: clear fluid    Pitocin- 8 mu/min.  Antibiotics- none  Cervical ripening:  s/p 11 dose oral cytotec, cervidil x12 hrs and 5 doses vaginal cytotec     ASSESSMENT:  IUP @ 40w3d induction of labor or BMI > 40, now in early labor   GBS- negative  ROM x 19 hrs, clear fluid, afebrile  GHTN, labs WNL     PLAN:   Encouraged movement, upright positioning if not resting  Pain medication per request  Anticipate   Labor augmentation with Pitocin per protocol  Reevaluate as clinically indicated    ADALGISA Ty CNM

## 2025-07-28 NOTE — PLAN OF CARE
Data: Madelin Spain transferred to [id number] via wheelchair at 1805. Baby transferred via parent's arms.  Action: Receiving unit notified of transfer: Yes. Patient and family notified of room change. Report given to Barbie CHASE RN at 1810. Belongings sent to receiving unit. Accompanied by Registered Nurse. Oriented patient to surroundings. Call light within reach. ID bands double-checked with receiving RN.  Response: Patient tolerated transfer and is stable.

## 2025-07-28 NOTE — L&D DELIVERY NOTE
Delivery Summary    Madelin Spain MRN# [id number]   Age: 20 year old YOB: 2005     SNM and JUAN ANTONIOM called to the bedside at 1330 due to Madelin feeling increased pressure, SVE noted to be 10/100/+2. Room was set up for delivery and Madelin began pushing at 1335. Madelin pushed in modified lithotomy, alternating between handlebars and holding her legs. She pushed with great maternal effort with guidance from SNM, JUAN ANTONIOM, and RN. She delivered a viable baby boy in the middle OA position after 57 minutes of pushing, APGARs 9 and 9. Baby was immediately placed on maternal chest. Delayed cord clamping > 3 minutes, unable to wait for cessation of pulsing due to a steady stream of bleeding. Cord was clamped twice and cut by FOB. Cord blood obtained and sent to lab. Rectal misoprostol (800 mcg) administered. AMTSL with IV Pitocin and gentle cord traction. Placenta delivered via Hoff mechanism. Genital tract inspection revealed 2nd degree laceration, repaired using 3.0 Vicyrl on a CT needle, hemostasis noted. Rectal exam completed.  ccs. Placenta sent to pathology due to maternal hypertensive disorder.    Delivery Note  IUP at 40 weeks gestation delivered on 2025.     delivery of a viable 8 pounds 8 ounces male infant at 1432  Apgars of 9 at 1 minute and 9 at 5 minutes.  Labor was induced.  Active pushing time:  0 hours 57 minutes.  Medications administered in labor: Pain Rx IV Fentanyl, Nitrous Oxide, Epidural; Antibiotics No  Perineum: 2nd degree and Vaginal, delayed cord clamping Yes > 3 mins  Placenta-mechanism: spontaneous via Schultze mechanism, intact, with a 3 vessel cord. Placenta was sent to Pathology. IV oxytocin and 800mcg rectal misoprostol was given.  Estimated Blood Loss: 760 ccs  Complications of pregnancy, labor and delivery: GHTN and PPH > 500 cc  Birth attendants: Melissa Urbina CNM; MARIA L Canales    ASSESSMENT:    Lactating mother  GHTN  2nd degree laceration; repaired  QBL  760    PLAN:   Routine PP cares  Lactation to round  Monitor BP per hypertension protocol  Hgb in AM       Bettie Spain [[id number]]      Labor Event Times      Latent labor onset date/time: 2025 00:00    Active labor onset date: 25 Onset time:  7:00 AM   Dilation complete date: 25 Complete time:  1:30 PM   Start pushing date/time: 2025 13:35          Labor Events     labor?: No   steroids: None  Labor Type: Induction/Cervical ripening  Predominate monitoring during 1st stage: continuous electronic fetal monitoring     Antibiotics received during labor?: No       Rupture date/time: 25 12:30   Rupture type: Artificial Rupture of Membranes  Fluid color: Clear  Fluid odor: Normal     Induction: Misoprostol, Cervidil  Induction date/time:      Cervical ripening date/time: 25     Indications for induction: Obesity     Augmentation: Oxytocin, AROM  Indications for augmentation: Ineffective Contraction Pattern       Delivery/Placenta Date and Time      Delivery Date: 25 Delivery Time:  2:32 PM   Placenta Date/Time: 2025  2:42 PM  Delivering clinician: Melissa Urbina APRN CNM   Other personnel present at delivery:  Provider Role   Jaci Lopez RN Delivery Assist   Melissa Mazariegos RN              Vaginal Counts       Initial count performed by 2 team members:  Two Team Members   Melissa Mazariegos RN         Needles Suture Needles Sponges (RETIRED) Instruments   Initial counts 2 1 5    Added to count       Relief counts       Final counts 2 1 5            Placed during labor Accounted for at the end of labor   FSE No NA   IUPC No NA   Cervidil No NA                  Final count performed by 2 team members:  Two Team Members   Melissa Mazariegos RN             Apgars    Living status: Living   1 Minute 5 Minute 10 Minute 15 Minute 20 Minute   Skin color: 1  1       Heart rate: 2  2       Reflex irritability: 2  2      "  Muscle tone: 2  2       Respiratory effort: 2  2       Total: 9  9       Apgars assigned by: AARTI       Cord      Vessels: 3 Vessels    Cord Complications: None               Cord Blood Disposition: Lab    Gases Sent?: No    Delayed cord clamping?: Yes    Cord Clamping Delay (seconds): >120 seconds    Stem cell collection?: No            Resuscitation    Methods: None  Output in Delivery Room: Stool       Lilly Measurements      Weight: 8 lb 8 oz Length: 1' 9\"     Head circumference: 35.6 cm    Output in delivery room: Stool       Skin to Skin and Feeding Plan      Skin to skin initiation date/time: 1841    Skin to skin with: Mother  Skin to skin end date/time:            Labor Events and Shoulder Dystocia    Fetal Tracing Prior to Delivery: Category 1  Shoulder dystocia present?: Neg       Delivery (Maternal) (Provider to Complete) ([id number])    Episiotomy: None  Perineal lacerations: 2nd Repaired?: Yes     Vaginal laceration?: Yes Repaired?: Yes   Repair suture: 3-0 Vicryl  Number of repair packets: 1  Genital tract inspection done: Pos       Blood Loss  Mother: Madelin Spain #[id number]     Start of Mother's Information      Delivery Blood Loss   Intrapartum & Postpartum: 25 0700 - 25 1540    Delivery Admission: 25 1950 - 25 1540         Intrapartum & Postpartum Delivery Admission    Delivery QBL (mL) Hospital Encounter 760 mL 760 mL    Total  760 mL 760 mL               End of Mother's Information  Mother: Grabiel Madelin JADA #[id number]                Delivery - Provider to Complete ([id number])    Delivering clinician: Melissa Urbina APRN CNM  Delivery Type (Choose the 1 that will go to the Birth History): Vaginal, Spontaneous                         Other personnel:  Provider Role   Jaci Lopez RN Delivery Assist   Melissa Mazariegos RN                     Placenta    Date/Time: 2025  2:42 PM  Removal: Spontaneous  Disposition: Pathology             Anesthesia "    Method: Epidural, INTRAVENOUS , Nitrous Oxide  Cervical dilation at placement: 4-7                    Presentation and Position    Presentation: Vertex    Position: Middle Occiput Anterior                     Christy Dubon, Student Nurse Midwife    I have reviewed and verified the documentation as completed by Christy Dubon Student Nurse Midwife, of the procedure which I personally performed.     Melissa DIANA CNM

## 2025-07-28 NOTE — PLAN OF CARE
Goal Outcome Evaluation:                    Pt actively laboring. Coping well with support of mother and mother in law, changing positions often and using the shower as hydrotherapy. Pt has used nitrous oxide and fentanyl, declines epidural. SVE at 0650 4.5/90/-1. Report given to Kaya CHASE RN

## 2025-07-28 NOTE — ANESTHESIA PROCEDURE NOTES
Epidural catheter Procedure Note    Pre-Procedure   Staff -        Anesthesiologist:  Fara Patel MD       Performed By: anesthesiologist       Location: OB       Pre-Anesthestic Checklist: patient identified, IV checked, site marked, risks and benefits discussed, informed consent, monitors and equipment checked, pre-op evaluation and at physician/surgeon's request  Timeout:       Correct Patient: Yes        Correct Procedure: Yes        Correct Site: Yes        Correct Position: Yes   Procedure Documentation  Procedure: epidural catheter         Patient Position: sitting       Patient Prep/Sterile Barriers: sterile gloves, mask, patient draped       Skin prep: Chloraprep       Local skin infiltrated with 1 mL of 1% lidocaine.        Insertion Site: L3-4. (midline approach).       Technique: LORT saline and LORT air        LEONOR at 8 cm.       Needle Type: Touhy needle       Needle Gauge: 17.        Needle Length (Inches): 3.5        Catheter: 19 G.          Catheter threaded easily.         5 cm epidural space.         Threaded 13 cm at skin.         # of attempts: 1 and  # of redirects:     Assessment/Narrative         Paresthesias: No.       Test dose of 3 mL lidocaine 1.5% w/ 1:200,000 epinephrine at.         Test dose negative, 3 minutes after injection, for signs of intravascular, subdural, or intrathecal injection.       Insertion/Infusion Method: LORT saline and LORT air       Aspiration negative for Heme or CSF via Epidural Catheter.    Medication(s) Administered   0.2% Ropivacaine (Epidural) - EPIDURAL   10 mL - 7/28/2025 9:33:00 AM   Comments:  Pre-procedure time out completed. Patient in sitting position, the lumbar spine was prepped and draped in sterile fashion. The L3/L4 interspace was identified and local anesthetic was injected for local skin infiltration. A 17 G touhy needle was advanced to the epidural space which was confirmed with the loss of resistance technique at 8 cm. A catheter was then  "advanced easily into the epidural space. The catheter was left at 12 cm at the skin. Negative aspiration of blood and CSF was confirmed. A test dose of 1.5% lidocaine with 1:200,000 epinephrine was injected through the catheter and was negative for intravascular injection. The site was covered with sterile tegaderm and the catheter was secured with tape.    Orders to manage the epidural infusion have been entered and, through coordination with the nurse, we will continue to manage and monitor the patient's labor epidural.  We will continuously be available to adjust as needed throughout the entire labor and delivery process.      FOR [facility] ([facility]) ONLY:   Pain Team Contact information: please page the Pain Team Via [employer]. Search \"Pain\". During daytime hours, please page the attending first. At night please page the resident first.      "

## 2025-07-28 NOTE — PROGRESS NOTES
Late entry due to patient care    CNM PROGRESS NOTE    SUBJECTIVE:  Madelin has been coping well with contractions throughout the day, has been changing positions, using the shower, ambulating.  Ramonita and other family members have been in room providing support PRN    OBJECTIVE:  /75 (BP Location: Left arm, Patient Position: Fowlers, Cuff Size: Adult Large)   Temp 97  F (36.1  C) (Temporal)   Resp 18   Wt 116.6 kg (257 lb)   LMP 10/08/2024   Breastfeeding Yes   BMI 44.11 kg/m      Fetal heart tones: Baseline 125   Variability: moderate  Accelerations: present  Decelerations: absent    Contractions: Pt is marce every 1.5-4 minutes, lasting 40-60 seconds and palpates moderate    Cervix: 1.5/ 80%/ -2, Vtx, forebag palpated per RN exam at 2005  ROM: clear fluid, scant amount    Pitocin- 20 mu/min.  Antibiotics- none  Cervical ripening: s/p 11 dose oral cytotec, cervidil x12 hrs and 5 doses vaginal cytotec     ASSESSMENT:  IUP @ 40w2d induction of labor for BMI > 40   GBS- negative  ROM x 8 hrs, scant amount of clear fluid     PLAN:   Encouraged upright positioning, ambulation  Pain medication: is aware of options, hopes to go unmedicated  Labor induction with Pitocin, will titrate per protocol  Reevaluate as clinically indicated  Will consider AROM of forebag w/next check    ADALGISA Ty CNM

## 2025-07-28 NOTE — CARE PLAN
Pt has been getting more tearful and uncomfortable with contractions. Questioning if pt has been ruptured as there has not been any fluid seen. SVE at 2000 unchanged, JHONY felt. Discussed needing AROM for a possible forebag and the option to use nitrous to help with the pain. Pt agreeable. Ronda Hansen, JOSE GUADALUPE at bedside to discuss with pt, nitrous set up and pt instructed on use prior to AROM. Shortly after rupture, pt not coping well with contractions. States they are coming one after another. Pitocin halved to 10 per CNM. Pt helped to the bathroom where she planned to get in the shower. Pt's mom present and helpful at the bedside.

## 2025-07-28 NOTE — CARE PLAN
Around 0300, called into pt's room to find out her IV accidentally got pulled out. Pt and room cleaned up, IV restarted with 2 attempts. Pitocin was off for >30 mins and restarted at 2millu/min.

## 2025-07-28 NOTE — PROGRESS NOTES
"CNM PROGRESS NOTE    SUBJECTIVE:  Madelin sitting at the side of bed. Feeling nauseated, just drank some orange juice. Mom at bedside rubbing her back.  Ramonita also in room with Madelin.     Reports she has not slept much since Thursday, has not been able to eat or tolerate liquids and has as already had a dose of Zofran.     No complaints of pre-e symptoms.    Madelin reports feeling contractions in abdomen and back. Just received her 3rd dose of IV Fentanyl. Has already used Nitrous Oxide    I asked Madelin what her plans are for pain mgmt after the IV Fentanyl stops working. Madelin shrugged her shoulders and was not able to say. I explained to Madelin the active and more intense part of labor is still yet to come, and energy will be needed. Explained pushing could be 10 minutes or up to 4 hrs in length. Without having adequate, (even minimal sleep), not eating or drinking, and being in pain will not help with labor progressing or with the energy needed for pushing. I explained I do not \"push\" epidurals on patients, but feel she would greatly benefit for getting one placed. I explained asking for an epidural is no one's choice but hers, and she is the one experiencing the pain, no one else is. I gave her the analogy of seeing a dentist to have a cavity filled, that procedure would not be done without pain medication. I explained the epidural procedure and what she can expect for pain relief and for the opportunity to get sleep. Madelin then said, \"okay, let's go, just go.\" I immediately left Madelin's room and let the nurse know we could begin preparing for epidural placement.       OBJECTIVE:  BP (!) 146/82 (Patient Position: Semi-Bryant's, Cuff Size: Adult Regular)   Temp 97.1  F (36.2  C) (Temporal)   Resp 18   Wt 116.6 kg (257 lb)   LMP 10/08/2024   Breastfeeding Yes   BMI 44.11 kg/m      Fetal heart tones: Baseline 125   Variability: Moderate  Accelerations: Present  Decelerations: Absent      Contractions: Pt is " marce every 5 minutes, lasting 40-80 seconds and palpates moderate    Cervix: 4.5/90%/-1, (RN check at 0645) Vtx  ROM: Clear fluid    Pitocin- 8 mu/min.  Antibiotics- None  Cervical ripening: s/p 11 dose oral cytotec, cervidil x12 hrs and 5 doses vaginal cytotec     ASSESSMENT:  IUP @ 40w3d early labor   GBS- negative  Membranes ruptured, clear fluid 21+ hrs, afebrile  GHTN, labs WNL  Maternal exhaustion  Nauseated  Not coping well with contractions     PLAN:   Comfort measures prn   Will encourage to sleep after epidural placement  Will continue monitoring signs/symptoms of GHTN  Will continue monitoring for s/symptoms of Triple Infection  Zofran as needed for nausea, if requested  Pain medication: Prepare pt. for epidural placement  Anticipate   Labor augmentation with Pitocin  Reevaluate as clinically indicated    ADALGISA MahanM

## 2025-07-28 NOTE — PROGRESS NOTES
CNM PROGRESS NOTE    SUBJECTIVE:  Madelin is using nitrous oxide, is continuing to move through contractions.     OBJECTIVE:  /72   Temp 97  F (36.1  C) (Temporal)   Resp 16   Wt 116.6 kg (257 lb)   LMP 10/08/2024   Breastfeeding Yes   BMI 44.11 kg/m      Fetal heart tones: Baseline 130   Variability: moderate  Accelerations: present  Decelerations: absent    Contractions: Pt is marce every 1.5-4 minutes, lasting 40-60 seconds and palpates moderate    Cervix: 2/ 90% / -2, Vtx, forebag palpated  ROM: clear fluid    Pitocin- 20 mu/min.  Antibiotics- none  Cervical ripening:  s/p 11 dose oral cytotec, cervidil x12 hrs and 5 doses vaginal cytotec     PROCEDURE:  AROM w/amniotomy of forebag after consent received, moderate amount of clear fluid after      ASSESSMENT:  IUP @ 40w2d induction of labor for BMI > 40   GBS- negative  ROM x 9hrs, clear fluid, afebrile  Intermittent mildly elevated BPs x 2, GHTN     PLAN:   Encouraged upright positioning, ambulation  CBC and CMP stat  Pain medication: is aware of options, hopes to go unmedicated  Labor induction with Pitocin, will titrate per protocol  Reevaluate as clinically indicated      ADALGISA Ty CNM

## 2025-07-28 NOTE — PLAN OF CARE
Took over pt care at 0730. Plan of care gone over with pt and family. At this time pt is managing the pain with IV fentanyl and movements. At 0900 Melissa Urbina CNM in room and went over plan if care with pt and decision was made to move forward with an epidural. At 0920 Dr. Jorge ESTRADA in room and epidural procedure started after timeout completed. At 1000 wilson inserted and SVE done by AUSTIN Urbina CNM. Pt n=ow 6/90/-1.  At 1120 P.decel started after repositioning pt. More reposistioing done, LR bolus started and   Pitocin turned off. Also AUSTIN Urbina CNM called to come assess pt. At 1130 SVE done and pt joseph 8-9/90/+1. Baby is tolerating her right side. At 1155 Zoraida SHI in department and updated on maternal status and fht's. At 1157 Pitocin started per verbal order. Pt comfortable and sleeping at this time. At 1330 SVE done and pt complete. Wilson removed and  pt started to push at 1335. At 1432 delivery of a viable male. Apgars 9 and 9. Placenta delivered at 1442. See Melissa Urbina CNM delivery note for more delivery details.

## 2025-07-28 NOTE — PLAN OF CARE
Madelin is A&Ox3, VSS, continues to labor on 16 milliunits of IV Pitocin, marce every 2-4 minutes and states that she is increasingly uncomfortable. She is declining any medicine at this time and has been up in the room with SBA for repositioning. Ramonita remains at the bedside and is supportive. Other family have been in and out throughout the day. Madelin is occasionally tearful over her discomfort and the slow progress of her labor. Reassurance offered. Ronda Gross CNM remains in dept and updated.     Bedside report given to Crissy Walker RN to assume care.

## 2025-07-29 LAB
ALBUMIN SERPL BCG-MCNC: 2.6 G/DL (ref 3.5–5.2)
ALP SERPL-CCNC: 155 U/L (ref 40–150)
ALT SERPL W P-5'-P-CCNC: 23 U/L (ref 0–50)
ANION GAP SERPL CALCULATED.3IONS-SCNC: 11 MMOL/L (ref 7–15)
AST SERPL W P-5'-P-CCNC: 35 U/L (ref 0–45)
BILIRUB SERPL-MCNC: <0.2 MG/DL
BUN SERPL-MCNC: 13.1 MG/DL (ref 6–20)
CALCIUM SERPL-MCNC: 8.4 MG/DL (ref 8.8–10.4)
CHLORIDE SERPL-SCNC: 108 MMOL/L (ref 98–107)
CREAT SERPL-MCNC: 1.23 MG/DL (ref 0.51–0.95)
EGFRCR SERPLBLD CKD-EPI 2021: 63 ML/MIN/1.73M2
ERYTHROCYTE [DISTWIDTH] IN BLOOD BY AUTOMATED COUNT: 14.2 % (ref 10–15)
GLUCOSE SERPL-MCNC: 91 MG/DL (ref 70–99)
HCO3 SERPL-SCNC: 21 MMOL/L (ref 22–29)
HCT VFR BLD AUTO: 25 % (ref 35–47)
HGB BLD-MCNC: 8.6 G/DL (ref 11.7–15.7)
HGB BLD-MCNC: 9.6 G/DL (ref 11.7–15.7)
MCH RBC QN AUTO: 31.5 PG (ref 26.5–33)
MCHC RBC AUTO-ENTMCNC: 34.4 G/DL (ref 31.5–36.5)
MCV RBC AUTO: 91 FL (ref 78–100)
MCV RBC AUTO: 92 FL (ref 78–100)
PLATELET # BLD AUTO: 212 10E3/UL (ref 150–450)
POTASSIUM SERPL-SCNC: 3.8 MMOL/L (ref 3.4–5.3)
PROT SERPL-MCNC: 5 G/DL (ref 6.4–8.3)
RBC # BLD AUTO: 2.73 10E6/UL (ref 3.8–5.2)
SODIUM SERPL-SCNC: 140 MMOL/L (ref 135–145)
WBC # BLD AUTO: 22 10E3/UL (ref 4–11)

## 2025-07-29 PROCEDURE — 85018 HEMOGLOBIN: CPT | Performed by: ADVANCED PRACTICE MIDWIFE

## 2025-07-29 PROCEDURE — 80053 COMPREHEN METABOLIC PANEL: CPT

## 2025-07-29 PROCEDURE — 36415 COLL VENOUS BLD VENIPUNCTURE: CPT

## 2025-07-29 PROCEDURE — 250N000011 HC RX IP 250 OP 636

## 2025-07-29 PROCEDURE — 85014 HEMATOCRIT: CPT

## 2025-07-29 PROCEDURE — 250N000013 HC RX MED GY IP 250 OP 250 PS 637: Performed by: ADVANCED PRACTICE MIDWIFE

## 2025-07-29 PROCEDURE — 120N000012 HC R&B POSTPARTUM

## 2025-07-29 PROCEDURE — 85041 AUTOMATED RBC COUNT: CPT

## 2025-07-29 PROCEDURE — 250N000013 HC RX MED GY IP 250 OP 250 PS 637

## 2025-07-29 PROCEDURE — 36415 COLL VENOUS BLD VENIPUNCTURE: CPT | Performed by: ADVANCED PRACTICE MIDWIFE

## 2025-07-29 PROCEDURE — 258N000003 HC RX IP 258 OP 636

## 2025-07-29 RX ORDER — ALBUTEROL SULFATE 90 UG/1
1-2 INHALANT RESPIRATORY (INHALATION)
Status: DISCONTINUED | OUTPATIENT
Start: 2025-07-29 | End: 2025-07-30 | Stop reason: HOSPADM

## 2025-07-29 RX ORDER — MEPERIDINE HYDROCHLORIDE 25 MG/ML
25 INJECTION INTRAMUSCULAR; INTRAVENOUS; SUBCUTANEOUS
Status: DISCONTINUED | OUTPATIENT
Start: 2025-07-29 | End: 2025-07-30 | Stop reason: HOSPADM

## 2025-07-29 RX ORDER — DIPHENHYDRAMINE HYDROCHLORIDE 50 MG/ML
25 INJECTION, SOLUTION INTRAMUSCULAR; INTRAVENOUS
Status: DISCONTINUED | OUTPATIENT
Start: 2025-07-29 | End: 2025-07-30 | Stop reason: HOSPADM

## 2025-07-29 RX ORDER — LANOLIN ALCOHOL/MO/W.PET/CERES
1000 CREAM (GRAM) TOPICAL DAILY
Qty: 60 TABLET | Refills: 0 | Status: SHIPPED | OUTPATIENT
Start: 2025-07-29

## 2025-07-29 RX ORDER — ALBUTEROL SULFATE 0.83 MG/ML
2.5 SOLUTION RESPIRATORY (INHALATION)
Status: DISCONTINUED | OUTPATIENT
Start: 2025-07-29 | End: 2025-07-30 | Stop reason: HOSPADM

## 2025-07-29 RX ORDER — MULTIVIT WITH MINERALS/LUTEIN
250 TABLET ORAL DAILY
Qty: 60 TABLET | Refills: 0 | Status: SHIPPED | OUTPATIENT
Start: 2025-07-29

## 2025-07-29 RX ORDER — DIPHENHYDRAMINE HYDROCHLORIDE 50 MG/ML
50 INJECTION, SOLUTION INTRAMUSCULAR; INTRAVENOUS
Status: DISCONTINUED | OUTPATIENT
Start: 2025-07-29 | End: 2025-07-30 | Stop reason: HOSPADM

## 2025-07-29 RX ORDER — IBUPROFEN 800 MG/1
800 TABLET, FILM COATED ORAL EVERY 6 HOURS PRN
Qty: 60 TABLET | Refills: 0 | Status: SHIPPED | OUTPATIENT
Start: 2025-07-29

## 2025-07-29 RX ORDER — FERROUS GLUCONATE 324(38)MG
324 TABLET ORAL
Qty: 60 TABLET | Refills: 0 | Status: SHIPPED | OUTPATIENT
Start: 2025-07-29

## 2025-07-29 RX ORDER — NIFEDIPINE 30 MG/1
30 TABLET, EXTENDED RELEASE ORAL DAILY
Status: DISCONTINUED | OUTPATIENT
Start: 2025-07-29 | End: 2025-07-30 | Stop reason: HOSPADM

## 2025-07-29 RX ORDER — METHYLPREDNISOLONE SODIUM SUCCINATE 40 MG/ML
40 INJECTION INTRAMUSCULAR; INTRAVENOUS
Status: DISCONTINUED | OUTPATIENT
Start: 2025-07-29 | End: 2025-07-30 | Stop reason: HOSPADM

## 2025-07-29 RX ORDER — ACETAMINOPHEN 325 MG/1
650 TABLET ORAL EVERY 4 HOURS PRN
Qty: 60 TABLET | Refills: 0 | Status: SHIPPED | OUTPATIENT
Start: 2025-07-29

## 2025-07-29 RX ADMIN — NIFEDIPINE 30 MG: 30 TABLET, FILM COATED, EXTENDED RELEASE ORAL at 20:26

## 2025-07-29 RX ADMIN — ACETAMINOPHEN 650 MG: 325 TABLET ORAL at 06:20

## 2025-07-29 RX ADMIN — ACETAMINOPHEN 650 MG: 325 TABLET ORAL at 18:32

## 2025-07-29 RX ADMIN — IBUPROFEN 800 MG: 400 TABLET ORAL at 12:21

## 2025-07-29 RX ADMIN — IRON SUCROSE 300 MG: 20 INJECTION, SOLUTION INTRAVENOUS at 12:46

## 2025-07-29 RX ADMIN — IBUPROFEN 800 MG: 400 TABLET ORAL at 06:20

## 2025-07-29 RX ADMIN — IBUPROFEN 800 MG: 400 TABLET ORAL at 18:32

## 2025-07-29 RX ADMIN — METHYLCELLULOSE 1000 MG: 500 TABLET ORAL at 08:04

## 2025-07-29 RX ADMIN — ACETAMINOPHEN 650 MG: 325 TABLET ORAL at 12:21

## 2025-07-29 ASSESSMENT — ACTIVITIES OF DAILY LIVING (ADL)
ADLS_ACUITY_SCORE: 22
ADLS_ACUITY_SCORE: 17
ADLS_ACUITY_SCORE: 17
ADLS_ACUITY_SCORE: 21
ADLS_ACUITY_SCORE: 21
ADLS_ACUITY_SCORE: 22
ADLS_ACUITY_SCORE: 22
ADLS_ACUITY_SCORE: 17
ADLS_ACUITY_SCORE: 17
ADLS_ACUITY_SCORE: 22
ADLS_ACUITY_SCORE: 17
ADLS_ACUITY_SCORE: 22
ADLS_ACUITY_SCORE: 21
ADLS_ACUITY_SCORE: 21
ADLS_ACUITY_SCORE: 17
ADLS_ACUITY_SCORE: 21
ADLS_ACUITY_SCORE: 21
ADLS_ACUITY_SCORE: 17
ADLS_ACUITY_SCORE: 17
ADLS_ACUITY_SCORE: 21
ADLS_ACUITY_SCORE: 21

## 2025-07-29 NOTE — PROVIDER NOTIFICATION
07/29/25 1419   Provider Notification   Provider Name/Title Meme Joe   Method of Notification Electronic Page  (vocera messaging)   Request Evaluate-Remote   Notification Reason Vital Signs Change;Status Update  (BPs >140/90)     Provider updated on patients elevated blood pressures. Provider would like patient to stay another night and monitor blood pressures. OK to cancel todays discharge order.

## 2025-07-29 NOTE — PLAN OF CARE
BPs slightly elevated. Labs ordered and discharge postponed. Postpartum assessment WDL. IV SL. Venefer given. Pain controlled with tylenol and ibuprofen. Patient voiding without difficulty. Showered. Breastfeeding on cue with RN assist and a breast shield. Patient and infant bonding well. Will continue with current plan of care.   Goal Outcome Evaluation:      Plan of Care Reviewed With: patient, spouse, parent    Overall Patient Progress: improvingOverall Patient Progress: improving

## 2025-07-29 NOTE — DISCHARGE SUMMARY
[facility] Discharge Summary    Madelin Spain MRN# [id number]   Age: 20 year old YOB: 2005     Date of Admission:  7/24/2025  Date of Discharge::  7/29/2025  Admitting Physician:  ADALGISA Meadows CNM  Discharge Physician:  ADALGISA Meadows CNM     Home clinic: [facility]          Admission Diagnoses:   Indication for care in labor and delivery, antepartum [O75.9]          Discharge Diagnosis:     Normal spontaneous vaginal delivery  Lactating mother  2nd degree laceration; repaired  Acute blood loss anemia as evidenced by by a decrease in hgb from 12.0 intrapartum to 9.6 g/dL postpartum; plan to treat with 300 mg venofer prior to discharge and PO supplementation with ferrous gluconate, vitamin C,a nd vitamin B12 PO daily.          Procedures:     Procedure(s): No additional procedures performed       No procedures performed during this admission           Medications Prior to Admission:     Medications Prior to Admission   Medication Sig Dispense Refill Last Dose/Taking    docusate sodium (COLACE) 100 MG capsule TAKE 1 TO 2 CAPSULES BY MOUTH EVERY  capsule 1     Prenatal Vit-Fe Fumarate-FA (PRENATAL MULTIVITAMIN  PLUS IRON) 27-1 MG TABS Take by mouth daily.       [DISCONTINUED] aspirin 81 MG EC tablet Take 1 tablet (81 mg) by mouth daily. Start at 12 weeks of pregnancy (1/10/25). 90 tablet 3              Discharge Medications:     Current Discharge Medication List        START taking these medications    Details   acetaminophen (TYLENOL) 325 MG tablet Take 2 tablets (650 mg) by mouth every 4 hours as needed for fever or other (second line or per patient preference for mild to moderate pain management).  Qty: 60 tablet, Refills: 0    Associated Diagnoses: Supervision of high risk pregnancy in first trimester      cyanocobalamin (VITAMIN B-12) 1000 MCG tablet Take 1 tablet (1,000 mcg) by mouth daily.  Qty: 60 tablet, Refills: 0    Associated Diagnoses:  Anemia due to blood loss, acute      ferrous gluconate (FERGON) 324 (38 Fe) MG tablet Take 1 tablet (324 mg) by mouth daily (with breakfast).  Qty: 60 tablet, Refills: 0    Associated Diagnoses: Anemia due to blood loss, acute      ibuprofen (ADVIL/MOTRIN) 800 MG tablet Take 1 tablet (800 mg) by mouth every 6 hours as needed for other (first line or per patient preference for mild to moderate pain management).  Qty: 60 tablet, Refills: 0    Associated Diagnoses: Supervision of high risk pregnancy in first trimester      vitamin C (ASCORBIC ACID) 250 MG tablet Take 1 tablet (250 mg) by mouth daily.  Qty: 60 tablet, Refills: 0    Associated Diagnoses: Anemia due to blood loss, acute           CONTINUE these medications which have NOT CHANGED    Details   docusate sodium (COLACE) 100 MG capsule TAKE 1 TO 2 CAPSULES BY MOUTH EVERY DAY  Qty: 100 capsule, Refills: 1    Associated Diagnoses: Constipation, unspecified constipation type      Prenatal Vit-Fe Fumarate-FA (PRENATAL MULTIVITAMIN  PLUS IRON) 27-1 MG TABS Take by mouth daily.           STOP taking these medications       aspirin 81 MG EC tablet Comments:   Reason for Stopping:                     Consultations:   No consultations were requested during this admission          Brief History of Labor:   Madelin was admitted for IOL on 7/24. She received cervical ripening over the course of 4 days. She began labor on 7/27 and was induced with Pitocin. She received an epidural on 7/28 and progressed to complete dilation. Delivered a viable baby boy, KJ, APGARS 9 and 9. 2nd degree laceration, repaired. .           Hospital Course:   The patient's hospital course was unremarkable.  On discharge, her pain was well controlled. Vaginal bleeding is similar to peak menstrual flow.  Voiding without difficulty.  Ambulating well and tolerating a normal diet.  No fever.  Breastfeeding well.  Infant is stable.  She has had a bowel movement.  She was discharged on post-partum  day #1.    Post-partum hemoglobin:   Hemoglobin   Date Value Ref Range Status   07/29/2025 9.6 (L) 11.7 - 15.7 g/dL Final   07/19/2019 14.0 11.7 - 15.7 g/dL Final             Discharge Instructions and Follow-Up:     Discharge diet: Regular   Discharge activity: Activity as tolerated   Discharge follow-up: Follow up with primary care provider in 2 and 6 weeks   Wound care: Drink plenty of fluids  Keep wound clean and dry           Discharge Disposition:     Discharged to home      Attestation:  I have reviewed today's vital signs, notes, medications, labs and imaging.    Christy Dubon, Student Nurse Midwife    I was present with the student who participated in the service and in the documentation of the note. I have verified the history and personally performed the physical exam and medical decision-making. I agree with the assessment and plan of care as documented in the note.    Meme Joe, ADALGISA, CNM

## 2025-07-29 NOTE — PLAN OF CARE
Goal Outcome Evaluation:       Vitally stable. Breastfeeding well with shield and nurse assist for positioning. Fundus firm. Scant flow. Straight cath x1. Low urine volume in bladder scan for subsequent attempt. Voided at 0630 but missed hat.  Patient reluctant to drink fluids. Denies headache, epigastric pain, and vision changes. Supportive partner at bedside.

## 2025-07-29 NOTE — H&P
CNM Labor Admission History & Physical     Madelin Spain is a 20 year old  with an IUP at 39w6d  -American; partnered  Partner/support Person: Ramonita  Language Barrier: English  [facility]  Provider: CNM's     Madelin Spain is admitted to the Birth[facility] at [facility] on 2025 at 7:54 PM      History of present inllness/Chief Complaint:  Madelin is here today for an IOL. Ramonita and both grandmothers are present and supportive. She was able to rest for most of the day and is feeling ready to start the process. She is hoping for KJ to be born tomorrow on his due date.     Here with: induction of labor secondary to BMI > 40  Patient reports contractions are None      Baby active Yes  Membranes are intact  Bloody show No   Any changes with medical history since last prenatal visit No     Obstetrical history  Estimated Date of Delivery: 2025 determined by US  Patient's last menstrual period was 10/08/2024.   Dating U/S: 2024    Fetal anatomic survey: Abnormal: MFM level 2-bilateral mild urinary tract dilation/bilateral CPC - resolved at 32 weeks  Placenta: Anterior     PRENATAL COURSE  Prenatal care began at 10 wks gestation for a total of 14 prenatal visits.  Total wt gain 35 lb; There is no height or weight on file to calculate BMI.  Prenatal Blood Pressure: WNL  Tdap: 2025  Rhogam: N/A; O+  Prenatal course was complicated by       Patient Active Problem List   Diagnosis    Menorrhagia with regular cycle    Dysmenorrhea    Hypercholesteremia    Supervision of high risk pregnancy in third trimester    BMI 37.0-37.9, adult    Obesity affecting pregnancy in third trimester    Pyelectasis of fetus on prenatal ultrasound    Choroid plexus cyst of fetus in acevedo pregnancy    Indication for care in labor and delivery, antepartum      HISTORY  Allergies   No Known Allergies     Past Medical History        Past Medical History:   Diagnosis Date    High  cholesterol      Menorrhagia           Past Surgical History         Past Surgical History:   Procedure Laterality Date    INSERT CONTRACEPTIVE IMPLANT - NEXPLANON/IMPLANON   2023         Family History         Family History   Problem Relation Age of Onset    Family History Negative Mother      Coronary Artery Disease Father      Hypertension Father      Hypertension Maternal Grandmother      Breast Cancer Maternal Aunt           Social History            Tobacco Use    Smoking status: Never       Passive exposure: Yes    Smokeless tobacco: Never   Substance Use Topics    Alcohol use: No                        OB History    Para Term  AB Living    1 0 0 0 0 0    SAB IAB Ectopic Multiple Live Births       0 0 0 0 0           # Outcome Date GA Lbr Fernandez/2nd Weight Sex Type Anes PTL Lv   1 Current                           LABS:        Lab Results   Component Value Date     AS Negative 2024     HGB 11.5 (L) 2025     HEPBANG Nonreactive 2024     CHPCRT Negative 2025     GCPCRT Negative 2025      GBS Status: Negative  Rubella: Immune    HIV: Non-Reactive   Platelets: 289  1hr GCT: passed; 89     ROS   Pt is alert and oriented  Pt denies significant constitutional symptoms including fever and/or malaise.    Pt denies significant respiratory, cardiovacular, GI, or muscular/skeletal complaints.    Neuro: Denies HA and visual changes  Muscoloskeletal: Denies except for discomforts r/t pregnancy      PHYSICAL EXAM:  /74   Temp 97.6  F (36.4  C) (Temporal)   Resp 16   LMP 10/08/2024   General appearance:  healthy, alert, active, no distress, and smiling   Heart: RRR  Lungs: CTA bilaterally, normal respiratory effort  Abdomen: gravid, single vertex fetus per  US and Leopold's, non-tender, EFW 8 lbs.   Legs: trace edema      Contractions: Pt is not marce     Fetal heart tones: Baseline 135   Variability: moderate   Accelerations: present  Decelerations: absent      EFM: Category 1     Cervix: 0.5 / 0 / Posterior / -4, Vtx per RN  Bloody show: no  Membranes: intact     Morris Score:  Dilation of Cervix:  0        Score = 0  Effacement:  0-30%;   Score = 0  Consistency:  Average;   Score = 1  Position:  Posterior;   Score = 0  Station:  -3;  Score = 0  Total Morris Score:  1     ASSESSMENT:  20 year old  with acevedo IUP 39w6d for induction of labor; indication BMI > 40  EFM: Category 1  MORRIS 1  Cervical ripening with misoprostol PO  Continuous EFM  GBS negative and membranes intact  BMI > 40     PLAN:  Routine CNM care  Admit - see IP orders  Labs ordered: CBC, syphilis screening, and type and screen  Teaching done r/t comfort measures, pain management options, induction and labor processes  Cervical ripening with misoprostol PO per pt preference, options discussed in clinic  Activity ad lay; recommend prioritizing rest tonight in preparation for labor  Regular diet; recommend oral nutrition and hydration  Pain medication per pt request - planning for unmedicated  Theraputic sleep per pt request; orders placed for hydroxyzine and morphine  Code status discussed, desire Full Code: YES     Plan to reassess in AM or earlier as clinically indicated.     Christy Dubon, Student Nurse Midwife     I was present with the student who participated in the service and in the documentation of the note. I have verified the history and personally performed the physical exam and medical decision-making. I agree with the assessment and plan of care as documented in the note.     Meme Joe, ADALGISA, CNM

## 2025-07-29 NOTE — PROVIDER NOTIFICATION
Melissa Urbina paged to notify of low urine output overnight and low fluid intake. Carlitos is ok with patient focusing on drinking more oral fluids this morning. No fluid bolus necessary.

## 2025-07-29 NOTE — ANESTHESIA POSTPROCEDURE EVALUATION
Patient: Madelin Spain    Procedure: * No procedures listed *       Anesthesia Type:  Epidural    Note:     Postop Pain Control: Uneventful            Sign Out: Well controlled pain   PONV: No   Neuro/Psych: Uneventful            Sign Out: Acceptable/Baseline neuro status   Airway/Respiratory: Uneventful            Sign Out: Acceptable/Baseline resp. status   CV/Hemodynamics: Uneventful            Sign Out: Acceptable CV status; No obvious hypovolemia; No obvious fluid overload   Other NRE: NONE   DID A NON-ROUTINE EVENT OCCUR? No           Last vitals:  Vitals:    07/29/25 1350 07/29/25 1410 07/29/25 1545   BP: (!) 149/88 (!) 148/78 134/81   Pulse:   76   Resp:   16   Temp:   36.3  C (97.3  F)   SpO2:          Electronically Signed By: Fernie Brown MD  July 29, 2025  4:35 PM

## 2025-07-29 NOTE — LACTATION NOTE
Lactation visit with Madelin, her mother, & baby boy HORACE.  Madelin was working on feeding at time of visit. Baby has been using a nipple shield with latching. Encouraged to keep using shield, as Madelin has difficulty maintaining a latch without the shield. We also discussed importance of feeding him at least every 3 hours, sooner if he is hungry. Reviewed feeding on demand and likelihood that he'll want to feed more often in days 2-3. Reviewed early milk production and ways to stimulate milk supply with frequent feedings.    We worked on positioning and used football hold, repositioning baby and Madelin to help her keep baby close at breast and help him latch more deeply. Used a blanket roll behind baby to help him stay latched deeply. He was able to maintain a nutritive suck pattern only with Lactation consultant holding him at breast, but would slip off the nipple shield when he wasn't either held at breast with another hand at breast or if Madelin was verbally coached to hold him closer at breast. Tried multiple position changes to help maintain latch position. Madelin was very sleepy, as well, during visit, and seeming to have a lot of difficulty staying awake.  Madelin's mom was asking questions about supplementation at the end of our visit, and when it would be a good choice, so we discussed supplementation with formula at parents' choice and how to know if baby is getting enough with breastfeeding.  Discussed cluster feeding, what it is and when to expect it, The Second Night, satiety cues, feeding cues, and reviewed Feeding Log for home use. Encouraged to review Breastfeeding section in Your Guide to Postpartum &  Care.    Feeding plan: Recommend unlimited, frequent breast feedings: At least 8 - 12 times every 24 hours. If choosing to supplement with formula, discussed supplementing after breastfeeding or after trying to breastfeed. Encouraged use of feeding log and to record feedings, and void/stool patterns.  Madelin has a breast pump for home use. Reviewed outpatient lactation resources. Madelin appreciative of visit.    Sandra Winslow, RN, BSN, MNN, IBCLC

## 2025-07-29 NOTE — PROGRESS NOTES
CNM Postpartum Discharge Note    SIGNIFICANT PROBLEMS:  Patient Active Problem List   Diagnosis Code    Menorrhagia with regular cycle N92.0    Dysmenorrhea N94.6    Hypercholesteremia E78.00    Supervision of high risk pregnancy in third trimester O09.93    BMI 37.0-37.9, adult Z68.37    Obesity affecting pregnancy in third trimester O99.213    Pyelectasis of fetus on prenatal ultrasound O35.EXX0    Choroid plexus cyst of fetus in acevedo pregnancy O35.03X0    Indication for care in labor and delivery, antepartum O75.9    Gestational hypertension, third trimester O13.3         SUBJECTIVE:  Patient is stable and is tolerating acitivity well  Baby is rooming in  Complications since 2 hours post delivery: None  Pain is well controlled.  Patient is taking pain medications.  Breastfeeding status: initiated   Elimination:  She is voiding without difficulty.  She has had a bowel movement  Desired contraception Unsure, considering paragard  Denies heavy bleeding and passing large clots.    Madelin is resting in bed. She reports having just urinated after initially having difficulty. She is breastfeeding using a shield - has a lactation counselor she is working with outside of the hospital. She is feeling well overall but very tired and wanting to go home today.    She is still processing how the induction went but feels good about how birth went and is happy KJ is here.    INTERVAL HISTORY:  /79 (BP Location: Left arm, Patient Position: Semi-Bryatn's, Cuff Size: Adult Regular)   Pulse 84   Temp 98.3  F (36.8  C) (Oral)   Resp 16   Wt 115.7 kg (255 lb)   LMP 10/08/2024   SpO2 94%   Breastfeeding Yes   BMI 43.77 kg/m      Constitutional: healthy, alert, no distress, and smiling    Breasts: Currently breastfeeding    Per RN flowsheet:  Fundus: Uterine fundus is firm, non-tender and at 1 cm below the level of the umbilicus    Perineum: Perineum is intact and well approximated, minimal swelling    Lochia: Lochia  is appropriate for the duration of time since delivery.     Postpartum hemoglobin   Hemoglobin   Date Value Ref Range Status   07/29/2025 9.6 (L) 11.7 - 15.7 g/dL Final   07/19/2019 14.0 11.7 - 15.7 g/dL Final     Blood type O pos  Rubella status Immune  History of depression:  no    ASSESSMENT/PLAN:  Normal postpartum course  Stable Post-partum day #1  Complications: acute blood loss anemia, plan for IV iron today and PO supplementation on discharge  Elevated blood pressure without diagnosis of HTN; reviewed warning signs. Will cancel diacharge if BP is elevated again this afternoon to evaluate for HDP    Postpartum warning s/s reviewed, including bleeding/clots, fever, mastitis & thromboemboli   Diet and rest reviewed  Continue prenatal vitamins while breastfeeding  Birthcontrol planned: Undecided. Fertility and contraception options reviewed. Discussed return to fertility and recommended pregnancy interval.    Educated on postpartum blues and postpartum depression warnings signs/symptoms  Order placed for home nurse visit    Follow-up in 2 and 6 weeks with CNMs at [facility] clinic  Plan d/c home today if BP remains stable    Current Discharge Medication List        CONTINUE these medications which have NOT CHANGED    Details   aspirin 81 MG EC tablet Take 1 tablet (81 mg) by mouth daily. Start at 12 weeks of pregnancy (1/10/25).  Qty: 90 tablet, Refills: 3    Associated Diagnoses: Supervision of high risk pregnancy in first trimester      docusate sodium (COLACE) 100 MG capsule TAKE 1 TO 2 CAPSULES BY MOUTH EVERY DAY  Qty: 100 capsule, Refills: 1    Associated Diagnoses: Constipation, unspecified constipation type      Prenatal Vit-Fe Fumarate-FA (PRENATAL MULTIVITAMIN  PLUS IRON) 27-1 MG TABS Take by mouth daily.           Christy Dubon, Student Nurse Midwife    I was present with the student who participated in the service and in the documentation of the note. I have verified the history  and personally performed the physical exam and medical decision-making. I agree with the assessment and plan of care as documented in the note.    ADALGISA Michael, CNM

## 2025-07-29 NOTE — PROVIDER NOTIFICATION
07/29/25 1635   Provider Notification   Provider Name/Title Meme Joe   Method of Notification Electronic Page  (vocera messaging)   Request Evaluate-Remote   Notification Reason Lab Results  (creatinien)     Provider updated on labs.

## 2025-07-30 VITALS
BODY MASS INDEX: 44.1 KG/M2 | HEART RATE: 82 BPM | RESPIRATION RATE: 16 BRPM | TEMPERATURE: 98.4 F | OXYGEN SATURATION: 94 % | WEIGHT: 256.9 LBS | SYSTOLIC BLOOD PRESSURE: 138 MMHG | DIASTOLIC BLOOD PRESSURE: 83 MMHG

## 2025-07-30 PROBLEM — O13.9 GESTATIONAL HYPERTENSION: Status: ACTIVE | Noted: 2025-07-30

## 2025-07-30 LAB
PATH REPORT.COMMENTS IMP SPEC: NORMAL
PATH REPORT.COMMENTS IMP SPEC: NORMAL
PATH REPORT.FINAL DX SPEC: NORMAL
PATH REPORT.GROSS SPEC: NORMAL
PATH REPORT.MICROSCOPIC SPEC OTHER STN: NORMAL
PATH REPORT.RELEVANT HX SPEC: NORMAL
PHOTO IMAGE: NORMAL

## 2025-07-30 PROCEDURE — 250N000013 HC RX MED GY IP 250 OP 250 PS 637

## 2025-07-30 PROCEDURE — 250N000013 HC RX MED GY IP 250 OP 250 PS 637: Performed by: ADVANCED PRACTICE MIDWIFE

## 2025-07-30 RX ORDER — NIFEDIPINE 30 MG
30 TABLET, EXTENDED RELEASE ORAL DAILY
Qty: 45 TABLET | Refills: 0 | Status: SHIPPED | OUTPATIENT
Start: 2025-07-31

## 2025-07-30 RX ADMIN — IBUPROFEN 800 MG: 400 TABLET ORAL at 13:26

## 2025-07-30 RX ADMIN — ACETAMINOPHEN 650 MG: 325 TABLET ORAL at 06:55

## 2025-07-30 RX ADMIN — NIFEDIPINE 30 MG: 30 TABLET, FILM COATED, EXTENDED RELEASE ORAL at 09:16

## 2025-07-30 RX ADMIN — IBUPROFEN 800 MG: 400 TABLET ORAL at 00:40

## 2025-07-30 RX ADMIN — METHYLCELLULOSE 1000 MG: 500 TABLET ORAL at 09:17

## 2025-07-30 RX ADMIN — ACETAMINOPHEN 650 MG: 325 TABLET ORAL at 13:26

## 2025-07-30 RX ADMIN — ACETAMINOPHEN 650 MG: 325 TABLET ORAL at 00:40

## 2025-07-30 RX ADMIN — IBUPROFEN 800 MG: 400 TABLET ORAL at 06:56

## 2025-07-30 ASSESSMENT — ACTIVITIES OF DAILY LIVING (ADL)
ADLS_ACUITY_SCORE: 22

## 2025-07-30 NOTE — PROGRESS NOTES
CNM Postpartum Discharge Note    SIGNIFICANT PROBLEMS:  Patient Active Problem List   Diagnosis Code    Menorrhagia with regular cycle N92.0    Dysmenorrhea N94.6    Hypercholesteremia E78.00    Supervision of high risk pregnancy in third trimester O09.93    BMI 37.0-37.9, adult Z68.37    Obesity affecting pregnancy in third trimester O99.213    Pyelectasis of fetus on prenatal ultrasound O35.EXX0    Choroid plexus cyst of fetus in acevedo pregnancy O35.03X0    Gestational hypertension, third trimester O13.3    Preeclampsia in postpartum period O14.95    Gestational hypertension O13.9     SUBJECTIVE:  Patient is stable and is tolerating acitivity well  Baby is rooming in  Complications since 2 hours post delivery: GHTN  Pain is well controlled.  Patient is taking pain medications.  Breastfeeding status: initiated   Elimination:  She is voiding without difficulty.  She has had a bowel movement  Desired contraception Unsure, has had nexplanon since 11th grade, doesn't think she wants hormones, maybe would consider IUD, her MIL has one.   Denies heavy bleeding and passing large clots.  Madelin has just taken a shower and is sitting up in bed. Ramonita and her MIL are present at the bedside. She reports some back pain that makes it difficult to be up and moving; heat application has been helpful. She is also having some shortness of breath when she is up and moving. Denies signs or symptoms of preeclampsia. Overall, feeling ready to go home. Agreeable to follow-up BP monitoring and oral iron.    INTERVAL HISTORY:  /83   Pulse 82   Temp 98.4  F (36.9  C) (Oral)   Resp 18   Wt 116.5 kg (256 lb 14.4 oz)   LMP 10/08/2024   SpO2 94%   Breastfeeding Yes   BMI 44.10 kg/m      Constitutional: healthy, alert, no distress, and smiling    Breasts: Currently breastfeeding    Per RN flowsheet:  Fundus: Uterine fundus is firm, non-tender and at 1 cm below the level of the umbilicus    Perineum: Perineum is well  approximated, minimal swelling    Lochia: Lochia is appropriate for the duration of time since delivery.     Postpartum hemoglobin   Hemoglobin   Date Value Ref Range Status   07/29/2025 8.6 (L) 11.7 - 15.7 g/dL Final   07/19/2019 14.0 11.7 - 15.7 g/dL Final     Blood type O+  Rubella status imm  History of depression: yes, in middle school; patient reported     ASSESSMENT/PLAN:  Stable post-partum day #2  PP hypertension; started on nifedipine ER 30 mg last evening, continue daily, BPs since 2000 last night have been in normal range  Take BP twice daily with home cuff, morning and evening, write down readings and send via Network every 3 days.  Home nurse visit; order placed  Anemic, received IV iron, plan for oral iron supplementation, rx sent  Back pain, recommended continued alternating of tylenol and ibuprofen in addition to local heat application  Postpartum warning s/s reviewed, including bleeding/clots, fever, mastitis & thromboemboli   Diet and rest reviewed  Continue prenatal vitamins while breastfeeding  Patient had answered yes to EPDS thoughts of self harm, seen by social work, and discussed feels she misunderstood the questions, feels well and has no current concerns  Birthcontrol planned: Undecided. Fertility and contraception options reviewed. May wish to have IUD placed at 6w PP visit, we reviewed condoms and recommendations for pregnancy spacing  Educated on postpartum blues and postpartum depression warnings signs/symptoms  In clinic BP early next week, scheduling with call patient  Follow-up in 2 and 6 weeks with CNMs at [facility] clinic  Plan d/c home today    Current Discharge Medication List        START taking these medications    Details   acetaminophen (TYLENOL) 325 MG tablet Take 2 tablets (650 mg) by mouth every 4 hours as needed for fever or other (second line or per patient preference for mild to moderate pain management).  Qty: 60 tablet, Refills: 0    Associated  Diagnoses: Supervision of high risk pregnancy in first trimester      cyanocobalamin (VITAMIN B-12) 1000 MCG tablet Take 1 tablet (1,000 mcg) by mouth daily.  Qty: 60 tablet, Refills: 0    Associated Diagnoses: Anemia due to blood loss, acute      ferrous gluconate (FERGON) 324 (38 Fe) MG tablet Take 1 tablet (324 mg) by mouth daily (with breakfast).  Qty: 60 tablet, Refills: 0    Associated Diagnoses: Anemia due to blood loss, acute      ibuprofen (ADVIL/MOTRIN) 800 MG tablet Take 1 tablet (800 mg) by mouth every 6 hours as needed for other (first line or per patient preference for mild to moderate pain management).  Qty: 60 tablet, Refills: 0    Associated Diagnoses: Supervision of high risk pregnancy in first trimester      NIFEdipine ER OSMOTIC (ADALAT CC) 30 MG 24 hr tablet Take 1 tablet (30 mg) by mouth daily.  Qty: 45 tablet, Refills: 0    Associated Diagnoses: Gestational hypertension, third trimester      vitamin C (ASCORBIC ACID) 250 MG tablet Take 1 tablet (250 mg) by mouth daily.  Qty: 60 tablet, Refills: 0    Associated Diagnoses: Anemia due to blood loss, acute           CONTINUE these medications which have NOT CHANGED    Details   docusate sodium (COLACE) 100 MG capsule TAKE 1 TO 2 CAPSULES BY MOUTH EVERY DAY  Qty: 100 capsule, Refills: 1    Associated Diagnoses: Constipation, unspecified constipation type      Prenatal Vit-Fe Fumarate-FA (PRENATAL MULTIVITAMIN  PLUS IRON) 27-1 MG TABS Take by mouth daily.           STOP taking these medications       aspirin 81 MG EC tablet Comments:   Reason for Stopping:             Christy Dubon, Student Nurse Midwife    I was present with the student who participated in the service and in the documentation of the note. I have verified the history and personally performed the physical exam and medical decision-making. I agree with the assessment and plan of care as documented in the note.    ADALGISA Horn, CNM

## 2025-07-30 NOTE — PLAN OF CARE
Goal Outcome Evaluation:      Plan of Care Reviewed With: patient      Vitally stable. Breastfeeding well. Oftentimes without shield. Fundus firm. Scant flow. A few small clots passed overnight. Started Nifedipine 30mg overnight. Attentive to infant needs.

## 2025-07-30 NOTE — PROGRESS NOTES
SUBJECTIVE:  Received notification from RN that pt BP has been increased this afternoon during venefer infusion. Per RN no evidence of HA, vision change, RUQ pain.    OBJECTIVE:  Patient Vitals for the past 24 hrs:   BP Temp Temp src Pulse Resp Weight   07/29/25 1954 (!) 147/91 98.5  F (36.9  C) Oral -- 16 --   07/29/25 1545 134/81 97.3  F (36.3  C) Oral 76 16 --   07/29/25 1410 (!) 148/78 -- -- -- -- --   07/29/25 1350 (!) 149/88 -- -- -- -- --   07/29/25 1335 129/67 -- -- 73 -- --   07/29/25 1320 123/73 -- -- -- -- --   07/29/25 1305 (!) 141/69 -- -- -- -- --   07/29/25 1250 136/80 -- -- 90 -- --   07/29/25 1220 134/89 -- -- 90 16 --   07/29/25 0800 133/79 98.3  F (36.8  C) Oral 84 16 --   07/29/25 0622 136/73 -- -- -- -- 115.7 kg (255 lb)   07/29/25 0229 119/73 98  F (36.7  C) Oral -- 16 --   07/28/25 2151 115/69 97.5  F (36.4  C) Oral 83 16 --     Recent Results (from the past 24 hours)   Hemoglobin   Result Value Ref Range    Hemoglobin 9.6 (L) 11.7 - 15.7 g/dL    MCV 91 78 - 100 fL   Comprehensive Metabolic Panel (Limited Occurrences)   Result Value Ref Range    Sodium 140 135 - 145 mmol/L    Potassium 3.8 3.4 - 5.3 mmol/L    Carbon Dioxide (CO2) 21 (L) 22 - 29 mmol/L    Anion Gap 11 7 - 15 mmol/L    Urea Nitrogen 13.1 6.0 - 20.0 mg/dL    Creatinine 1.23 (H) 0.51 - 0.95 mg/dL    GFR Estimate 63 >60 mL/min/1.73m2    Calcium 8.4 (L) 8.8 - 10.4 mg/dL    Chloride 108 (H) 98 - 107 mmol/L    Glucose 91 70 - 99 mg/dL    Alkaline Phosphatase 155 (H) 40 - 150 U/L    AST 35 0 - 45 U/L    ALT 23 0 - 50 U/L    Protein Total 5.0 (L) 6.4 - 8.3 g/dL    Albumin 2.6 (L) 3.5 - 5.2 g/dL    Bilirubin Total <0.2 <=1.2 mg/dL   CBC with Platelets (Limited Occurrences)   Result Value Ref Range    WBC Count 22.0 (H) 4.0 - 11.0 10e3/uL    RBC Count 2.73 (L) 3.80 - 5.20 10e6/uL    Hemoglobin 8.6 (L) 11.7 - 15.7 g/dL    Hematocrit 25.0 (L) 35.0 - 47.0 %    MCV 92 78 - 100 fL    MCH 31.5 26.5 - 33.0 pg    MCHC 34.4 31.5 - 36.5 g/dL    RDW  14.2 10.0 - 15.0 %    Platelet Count 212 150 - 450 10e3/uL     ASSESSMENT  Preeclampsia without severe features in the postpartum time period (HTN with Cr >1.1)  Normal spontaneous vaginal delivery  Lactating mother  2nd degree laceration; repaired  Acute blood loss anemia     PLAN:  Discontinue discharge for this evening.   Continue to observe BP per hospital policy  Neuro exams with VS  Nifedipine 30 mg PO daily, first dose tonight    Plan to reassess in the AM    ADALGISA Michael, CNM

## 2025-07-30 NOTE — DISCHARGE INSTRUCTIONS
Postpartum Vaginal Delivery Instructions    Activity     Ask family and friends for help when you need it.  Do not place anything in your vagina for 6 weeks.  You are not restricted on other activities, but take it easy for a few weeks to allow your body to recover from delivery.  You are able to do any activities you feel up to that point.  No driving until you have stopped taking your pain medications (usually two weeks after delivery).     Call your health care provider if you have any of these symptoms:     Increased pain, swelling, redness, or fluid around your stiches from an episiotomy or perineal tear.  A fever above 100.4 F (38 C) with or without chills when placing a thermometer under your tongue.  You soak a sanitary pad with blood within 1 hour, or you see blood clots larger than a golf ball.  Bleeding that lasts more than 6 weeks.  Vaginal discharge that smells bad.  Severe pain, cramping or tenderness in your lower belly area.  A need to urinate more frequently (use the toilet more often), more urgently (use the toilet very quickly), or it burns when you urinate.  Nausea and vomiting.  Redness, swelling or pain around a vein in your leg.  Problems breastfeeding or a red or painful area on your breast.  Chest pain and cough or are gasping for air.  Problems coping with sadness, anxiety, or depression.  If you have any concerns about hurting yourself or the baby, call your provider immediately.   You have questions or concerns after you return home.     Keep your hands clean:  Always wash your hands before touching your perineal area and stitches.  This helps reduce your risk of infection.  If your hands aren't dirty, you may use an alcohol hand-rub to clean your hands. Keep your nails clean and short.    Postpartum Care at Home With Your Baby: Care Instructions  Overview     After childbirth (postpartum period), your body goes through many changes as you recover. In these weeks after delivery, try to  "take good care of yourself. Get rest whenever you can and accept help from others.  It may take 4 to 6 weeks to feel like yourself again, and possibly longer if you had a  birth. You may feel sore or very tired as you recover. After delivery, you may continue to have contractions as the uterus returns to the size it was before your pregnancy. You will also have some vaginal bleeding. And you may have pain around the vagina as you heal. Several days after delivery you may also have pain and swelling in your breasts as they fill with milk. There are things you can do at home to help ease these discomforts.  After childbirth, it's common to feel emotional. You may feel irritable, cry easily, and feel happy one minute and sad the next. This is called the \"baby blues.\" Hormone changes are one cause of these emotional changes. These feelings usually get better within a couple of weeks. If they don't, talk to your doctor or midwife.  In the first couple of weeks after you give birth, your doctor or midwife may want to check in with you and make a plan for follow-up care. You will likely have a complete postpartum visit in the first 3 months after delivery. At that time, your doctor or midwife will check on your recovery and see how you're doing. But if you have questions or concerns before then, you can always call your doctor or midwife.  Follow-up care is a key part of your treatment and safety. Be sure to make and go to all appointments, and call your doctor if you are having problems. It's also a good idea to know your test results and keep a list of the medicines you take.  How can you care for yourself at home?  Taking care of your body  Use pads instead of tampons for bleeding. After birth, you will have bloody vaginal discharge. You may also pass some blood clots that shouldn't be bigger than an egg. Over the next 6 weeks or so, your bleeding should decrease a little every day and slowly change to a pinkish " and then whitish discharge.  For cramps or mild pain, try an over-the-counter pain medicine, such as acetaminophen (Tylenol) or ibuprofen (Advil, Motrin). Read and follow all instructions on the label.  To ease pain around the vagina or from hemorrhoids:  Put ice or a cold pack on the area for 10 to 20 minutes at a time. Put a thin cloth between the ice and your skin.  Try sitting in a few inches of warm water (sitz bath) when you can or after bowel movements.  Clean yourself with a gentle squeeze of warm water from a bottle instead of wiping with toilet paper.  Use witch hazel or hemorrhoid pads (such as Tucks).  Try using a cold compress for sore and swollen breasts. And wear a supportive bra that fits.  Ease constipation by drinking plenty of fluids and eating high-fiber foods. Ask your doctor or midwife about over-the-counter stool softeners.  Activity  Rest when you can.  Ask for help from family or friends when you need it.  If you can, have another adult in your home for at least 2 or 3 days after birth.  When you feel ready, try to get some exercise every day. For many people, walking is a good choice. Don't do any heavy exercise until your doctor or midwife says it's okay.  Ask your doctor or midwife when it is okay to have vaginal sex.  If you don't want to get pregnant, talk to your doctor or midwife about birth control options. You can get pregnant even before your period returns. Also, you can get pregnant while you are breastfeeding.  Talk to your doctor or midwife if you want to get pregnant again. They can talk to you about when it is safe.  Emotional health  It's normal to have some sadness, anxiety, and mood swings after delivery. It may help to talk with a trusted friend or family member. You can also call the Maternal Mental Health Hotline at 7-983-[facility] (1-176.613.6614) for support. If these mood changes last more than a couple of weeks, talk to your doctor or midwife.  When should you call  for help?  Share this information with your partner, family, or a friend. They can help you watch for warning signs.  Call 911  anytime you think you may need emergency care. For example, call if:    You feel you cannot stop from hurting yourself, your baby, or someone else.     You passed out (lost consciousness).     You have chest pain, are short of breath, or cough up blood.     You have a seizure.   Where to get help 24 hours a day, 7 days a week   If you or someone you know talks about suicide, self-harm, a mental health crisis, a substance use crisis, or any other kind of emotional distress, get help right away. You can:    Call the Suicide and Crisis Lifeline at 988.     Call 2-889-746-TALK (1-471.459.1130).     Text HOME to 594522 to access the Crisis Text Line.   Consider saving these numbers in your phone.  Go to [employer] for more information or to chat online.  Call your doctor or midwife now or seek immediate medical care if:    You have signs of hemorrhage (too much bleeding), such as:  Heavy vaginal bleeding. This means that you are soaking through one or more pads in an hour. Or you pass blood clots bigger than an egg.  Feeling dizzy or lightheaded, or you feel like you may faint.  Feeling so tired or weak that you cannot do your usual activities.  A fast or irregular heartbeat.  New or worse belly pain.     You have signs of infection, such as:  A fever.  Increased pain, swelling, warmth, or redness from an incision or wound.  Frequent or painful urination or blood in your urine.  Vaginal discharge that smells bad.  New or worse belly pain.     You have symptoms of a blood clot in your leg (called a deep vein thrombosis), such as:  Pain in the calf, back of the knee, thigh, or groin.  Swelling in the leg or groin.  A color change on the leg or groin. The skin may be reddish or purplish, depending on your usual skin color.     You have signs of preeclampsia, such as:  Sudden swelling of your  "face, hands, or feet.  New vision problems (such as dimness, blurring, or seeing spots).  A severe headache.     You have signs of heart failure, such as:  New or increased shortness of breath.  New or worse swelling in your legs, ankles, or feet.  Sudden weight gain, such as more than 2 to 3 pounds in a day or 5 pounds in a week.  Feeling so tired or weak that you cannot do your usual activities.     You had spinal or epidural pain relief and have:  New or worse back pain.  Increased pain, swelling, warmth, or redness at the injection site.  Tingling, weakness, or numbness in your legs or groin.   Watch closely for changes in your health, and be sure to contact your doctor or midwife if:    Your vaginal bleeding isn't decreasing.     You feel sad, anxious, or hopeless for more than a few days.     You are having problems with your breasts or breastfeeding.   Where can you learn more?  Go to https://www.[employer].net/patiented  Enter Z768 in the search box to learn more about \"Postpartum Care at Home With Your Baby: Care Instructions.\"  Current as of: April 30, 2024  Content Version: 14.5 2024-2025 [employer].   Care instructions adapted under license by your healthcare professional. If you have questions about a medical condition or this instruction, always ask your healthcare professional. [employer] disclaims any warranty or liability for your use of this information.    "

## 2025-07-30 NOTE — PLAN OF CARE
Goal Outcome Evaluation:      Plan of Care Reviewed With: patient, spouse    Overall Patient Progress: improvingOverall Patient Progress: improving     D: VSS, assessments WDL.   I: Pt. received complete discharge paperwork and home medications as filled by discharge pharmacy given.  Pt. was given times of last dose for all discharge medications in writing on discharge medication sheets.  Discharge teaching included home medication, pain management, activity restrictions, postpartum cares, and signs and symptoms of infection.    A: Discharge outcomes on care plan met.  Mother states understanding and comfort with self care and follow up care.   P: Pt. Discharged.  Pt. was accompanied by family friend and left with personal belongings.  Home care no.  Pt. to follow up with OB provider per discharge instructions.  Pt. had no further questions at the time of discharge and no unmet needs were identified.

## 2025-07-30 NOTE — CONSULTS
"SWS Progress Note    Data: SW consult for postpartum assessment due to indicating thoughts of self harm on the EPDS. Pt is Madelin, a 21yo who delivered her baby, HORACE, on 7/28/25. Pt spouse/significant other and FOB is Ramonita who is present and supportive. Parents have no other children.  Intervention: SW has reviewed pt records. SW met with pt this morning to introduce self/role, perform assessment, and discuss resources. SW inquired about pt mental health history, pt shared she does not have any history of depression or anxiety. But she does report later in the visit that she had some depression in middle school but no concerns currently. When asked about thoughts of self harm as she indicated on to EPDS, patient states she did not mean to put that down and was thinking about her depression in middle school but that she did not have thoughts of self harm then. Patient currently feels good and has no concerns about her mental health at this time. SW normalized \"rollercoaster\" of emotions that may occur following delivery as well as discussed s/s that may be a concern for potential PPD/PPA. Pt has insight on the s/s to look for, SW discussed techniques for coping and the importance of family awareness and support. SW also encouraged pt to alert her MD of any concerns at her follow-up appointment. SW discussed PPD/PPA resource folder and provided brief overview of information included. Pt appreciative of the resources. Pt feels prepared for discharge and has no additional questions/concerns.  Assessment: Pt pleasant and welcoming of SW involvement. Pt observed to have positive affect during conversation. SW allowed space for pt to share thoughts/concerns re: PPD/PPA. SW provided reflective listening and supportive counseling. Parents are supportive of one another, bonding well with baby, no concerns.  Plan: No further SW follow-up indicated. Pt and baby to discharge today with above mentioned resources. SW provided " this writer's contact information if any specific questions arise about the resources provided.   Rhea Hardin LGSW

## 2025-08-12 ASSESSMENT — EDINBURGH POSTNATAL DEPRESSION SCALE (EPDS)
I HAVE FELT SCARED OR PANICKY FOR NO GOOD REASON: YES, SOMETIMES
I HAVE BLAMED MYSELF UNNECESSARILY WHEN THINGS WENT WRONG: NO, NEVER
I HAVE LOOKED FORWARD WITH ENJOYMENT TO THINGS: AS MUCH AS I EVER DID
I HAVE BEEN SO UNHAPPY THAT I HAVE HAD DIFFICULTY SLEEPING: NOT VERY OFTEN
I HAVE FELT SAD OR MISERABLE: NO, NOT AT ALL
THINGS HAVE BEEN GETTING ON TOP OF ME: NO, MOST OF THE TIME I HAVE COPED QUITE WELL
I HAVE BEEN ANXIOUS OR WORRIED FOR NO GOOD REASON: HARDLY EVER
I HAVE BEEN SO UNHAPPY THAT I HAVE BEEN CRYING: NO, NEVER
I HAVE BEEN ABLE TO LAUGH AND SEE THE FUNNY SIDE OF THINGS: AS MUCH AS I ALWAYS COULD
THE THOUGHT OF HARMING MYSELF HAS OCCURRED TO ME: NEVER
TOTAL SCORE: 5

## 2025-08-13 ENCOUNTER — PRENATAL OFFICE VISIT (OUTPATIENT)
Dept: MIDWIFE SERVICES | Facility: CLINIC | Age: 20
End: 2025-08-13
Payer: COMMERCIAL

## 2025-08-13 VITALS
WEIGHT: 223.2 LBS | SYSTOLIC BLOOD PRESSURE: 102 MMHG | HEIGHT: 64 IN | BODY MASS INDEX: 38.1 KG/M2 | DIASTOLIC BLOOD PRESSURE: 54 MMHG

## 2025-08-13 DIAGNOSIS — O09.91 SUPERVISION OF HIGH RISK PREGNANCY IN FIRST TRIMESTER: Primary | ICD-10-CM

## 2025-08-13 LAB
HGB BLD-MCNC: 12.6 G/DL (ref 11.7–15.7)
MCV RBC AUTO: 93.7 FL (ref 78–100)

## 2025-08-13 PROCEDURE — 36415 COLL VENOUS BLD VENIPUNCTURE: CPT | Performed by: ADVANCED PRACTICE MIDWIFE

## 2025-08-13 PROCEDURE — 85018 HEMOGLOBIN: CPT | Performed by: ADVANCED PRACTICE MIDWIFE

## 2025-08-18 ENCOUNTER — HOSPITAL ENCOUNTER (EMERGENCY)
Facility: CLINIC | Age: 20
Discharge: HOME | End: 2025-08-19
Attending: EMERGENCY MEDICINE | Admitting: EMERGENCY MEDICINE
Payer: COMMERCIAL

## 2025-08-18 DIAGNOSIS — R79.89 ELEVATED LFTS: Primary | ICD-10-CM

## 2025-08-18 DIAGNOSIS — R10.11 RUQ ABDOMINAL PAIN: ICD-10-CM

## 2025-08-18 PROCEDURE — 99283 EMERGENCY DEPT VISIT LOW MDM: CPT | Performed by: EMERGENCY MEDICINE

## 2025-08-18 ASSESSMENT — COLUMBIA-SUICIDE SEVERITY RATING SCALE - C-SSRS
6. HAVE YOU EVER DONE ANYTHING, STARTED TO DO ANYTHING, OR PREPARED TO DO ANYTHING TO END YOUR LIFE?: NO
2. HAVE YOU ACTUALLY HAD ANY THOUGHTS OF KILLING YOURSELF IN THE PAST MONTH?: NO
1. IN THE PAST MONTH, HAVE YOU WISHED YOU WERE DEAD OR WISHED YOU COULD GO TO SLEEP AND NOT WAKE UP?: NO

## 2025-08-19 VITALS
DIASTOLIC BLOOD PRESSURE: 77 MMHG | RESPIRATION RATE: 18 BRPM | HEART RATE: 81 BPM | OXYGEN SATURATION: 99 % | TEMPERATURE: 97.5 F | SYSTOLIC BLOOD PRESSURE: 123 MMHG

## 2025-08-19 LAB
ALBUMIN SERPL BCG-MCNC: 4.1 G/DL (ref 3.5–5.2)
ALBUMIN UR-MCNC: 10 MG/DL
ALP SERPL-CCNC: 230 U/L (ref 40–150)
ALT SERPL W P-5'-P-CCNC: 71 U/L (ref 0–50)
ANION GAP SERPL CALCULATED.3IONS-SCNC: 11 MMOL/L (ref 7–15)
APPEARANCE UR: CLEAR
AST SERPL W P-5'-P-CCNC: 200 U/L (ref 0–45)
BILIRUB SERPL-MCNC: 0.3 MG/DL
BILIRUB UR QL STRIP: NEGATIVE
BUN SERPL-MCNC: 7.1 MG/DL (ref 6–20)
CALCIUM SERPL-MCNC: 9 MG/DL (ref 8.8–10.4)
CHLORIDE SERPL-SCNC: 106 MMOL/L (ref 98–107)
COLOR UR AUTO: YELLOW
CREAT SERPL-MCNC: 0.8 MG/DL (ref 0.51–0.95)
EGFRCR SERPLBLD CKD-EPI 2021: >90 ML/MIN/1.73M2
ERYTHROCYTE [DISTWIDTH] IN BLOOD BY AUTOMATED COUNT: 13.1 % (ref 10–15)
GLUCOSE SERPL-MCNC: 98 MG/DL (ref 70–99)
GLUCOSE UR STRIP-MCNC: NEGATIVE MG/DL
HCG INTACT+B SERPL-ACNC: 3 MIU/ML
HCO3 SERPL-SCNC: 23 MMOL/L (ref 22–29)
HCT VFR BLD AUTO: 34.6 % (ref 35–47)
HGB BLD-MCNC: 11.5 G/DL (ref 11.7–15.7)
HGB UR QL STRIP: ABNORMAL
HOLD SPECIMEN: NORMAL
KETONES UR STRIP-MCNC: NEGATIVE MG/DL
LEUKOCYTE ESTERASE UR QL STRIP: ABNORMAL
LIPASE SERPL-CCNC: 22 U/L (ref 13–60)
MCH RBC QN AUTO: 30.5 PG (ref 26.5–33)
MCHC RBC AUTO-ENTMCNC: 33.2 G/DL (ref 31.5–36.5)
MCV RBC AUTO: 91.8 FL (ref 78–100)
MUCOUS THREADS #/AREA URNS LPF: PRESENT /LPF
NITRATE UR QL: NEGATIVE
PH UR STRIP: 6.5 [PH] (ref 5–7)
PLATELET # BLD AUTO: 387 10E3/UL (ref 150–450)
POTASSIUM SERPL-SCNC: 4.2 MMOL/L (ref 3.4–5.3)
PROT SERPL-MCNC: 7.4 G/DL (ref 6.4–8.3)
RBC # BLD AUTO: 3.77 10E6/UL (ref 3.8–5.2)
RBC URINE: 5 /HPF
SODIUM SERPL-SCNC: 140 MMOL/L (ref 135–145)
SP GR UR STRIP: 1.02 (ref 1–1.03)
SQUAMOUS EPITHELIAL: 4 /HPF
UROBILINOGEN UR STRIP-MCNC: NORMAL MG/DL
WBC # BLD AUTO: 12.61 10E3/UL (ref 4–11)
WBC URINE: 29 /HPF

## 2025-08-19 PROCEDURE — 87086 URINE CULTURE/COLONY COUNT: CPT | Performed by: EMERGENCY MEDICINE

## 2025-08-19 PROCEDURE — 83690 ASSAY OF LIPASE: CPT | Performed by: EMERGENCY MEDICINE

## 2025-08-19 PROCEDURE — 82310 ASSAY OF CALCIUM: CPT | Performed by: EMERGENCY MEDICINE

## 2025-08-19 PROCEDURE — 36415 COLL VENOUS BLD VENIPUNCTURE: CPT | Performed by: EMERGENCY MEDICINE

## 2025-08-19 PROCEDURE — 85014 HEMATOCRIT: CPT | Performed by: EMERGENCY MEDICINE

## 2025-08-19 PROCEDURE — 81001 URINALYSIS AUTO W/SCOPE: CPT | Performed by: EMERGENCY MEDICINE

## 2025-08-19 PROCEDURE — 84702 CHORIONIC GONADOTROPIN TEST: CPT | Performed by: EMERGENCY MEDICINE

## 2025-08-19 ASSESSMENT — ACTIVITIES OF DAILY LIVING (ADL)
ADLS_ACUITY_SCORE: 52

## 2025-08-20 LAB — BACTERIA UR CULT: NORMAL

## 2025-08-26 ENCOUNTER — HOSPITAL ENCOUNTER (OUTPATIENT)
Facility: CLINIC | Age: 20
End: 2025-08-26
Admitting: ADVANCED PRACTICE MIDWIFE
Payer: COMMERCIAL